# Patient Record
Sex: FEMALE | Race: WHITE | Employment: OTHER | URBAN - METROPOLITAN AREA
[De-identification: names, ages, dates, MRNs, and addresses within clinical notes are randomized per-mention and may not be internally consistent; named-entity substitution may affect disease eponyms.]

---

## 2017-11-08 ENCOUNTER — APPOINTMENT (OUTPATIENT)
Dept: CT IMAGING | Facility: CLINIC | Age: 82
End: 2017-11-08
Attending: EMERGENCY MEDICINE
Payer: COMMERCIAL

## 2017-11-08 ENCOUNTER — HOSPITAL ENCOUNTER (EMERGENCY)
Facility: CLINIC | Age: 82
Discharge: HOME | End: 2017-11-08
Attending: EMERGENCY MEDICINE | Admitting: EMERGENCY MEDICINE
Payer: COMMERCIAL

## 2017-11-08 VITALS
HEIGHT: 65 IN | HEART RATE: 84 BPM | OXYGEN SATURATION: 98 % | TEMPERATURE: 100.2 F | SYSTOLIC BLOOD PRESSURE: 137 MMHG | RESPIRATION RATE: 16 BRPM | DIASTOLIC BLOOD PRESSURE: 82 MMHG

## 2017-11-08 DIAGNOSIS — K57.32 DIVERTICULITIS OF COLON: ICD-10-CM

## 2017-11-08 LAB
ALBUMIN SERPL-MCNC: 3.4 G/DL (ref 3.4–5)
ALBUMIN UR-MCNC: NEGATIVE MG/DL
ALP SERPL-CCNC: 73 U/L (ref 40–150)
ALT SERPL W P-5'-P-CCNC: 24 U/L (ref 0–50)
ANION GAP SERPL CALCULATED.3IONS-SCNC: 6 MMOL/L (ref 3–14)
APPEARANCE UR: CLEAR
AST SERPL W P-5'-P-CCNC: 28 U/L (ref 0–45)
BACTERIA #/AREA URNS HPF: ABNORMAL /HPF
BASOPHILS # BLD AUTO: 0 10E9/L (ref 0–0.2)
BASOPHILS NFR BLD AUTO: 0.3 %
BILIRUB SERPL-MCNC: 0.7 MG/DL (ref 0.2–1.3)
BILIRUB UR QL STRIP: NEGATIVE
BUN SERPL-MCNC: 17 MG/DL (ref 7–30)
CALCIUM SERPL-MCNC: 8.9 MG/DL (ref 8.5–10.1)
CHLORIDE SERPL-SCNC: 107 MMOL/L (ref 94–109)
CO2 SERPL-SCNC: 30 MMOL/L (ref 20–32)
COLOR UR AUTO: YELLOW
CREAT SERPL-MCNC: 0.78 MG/DL (ref 0.52–1.04)
DIFFERENTIAL METHOD BLD: ABNORMAL
EOSINOPHIL # BLD AUTO: 0.2 10E9/L (ref 0–0.7)
EOSINOPHIL NFR BLD AUTO: 1.9 %
ERYTHROCYTE [DISTWIDTH] IN BLOOD BY AUTOMATED COUNT: 14.9 % (ref 10–15)
GFR SERPL CREATININE-BSD FRML MDRD: 69 ML/MIN/1.7M2
GLUCOSE SERPL-MCNC: 92 MG/DL (ref 70–99)
GLUCOSE UR STRIP-MCNC: NEGATIVE MG/DL
HCT VFR BLD AUTO: 42.3 % (ref 35–47)
HGB BLD-MCNC: 14.1 G/DL (ref 11.7–15.7)
HGB UR QL STRIP: ABNORMAL
IMM GRANULOCYTES # BLD: 0 10E9/L (ref 0–0.4)
IMM GRANULOCYTES NFR BLD: 0.3 %
INR PPP: 2.39 (ref 0.86–1.14)
KETONES UR STRIP-MCNC: NEGATIVE MG/DL
LACTATE BLD-SCNC: 1.3 MMOL/L (ref 0.7–2)
LEUKOCYTE ESTERASE UR QL STRIP: NEGATIVE
LYMPHOCYTES # BLD AUTO: 0.9 10E9/L (ref 0.8–5.3)
LYMPHOCYTES NFR BLD AUTO: 8.3 %
MCH RBC QN AUTO: 32.6 PG (ref 26.5–33)
MCHC RBC AUTO-ENTMCNC: 33.3 G/DL (ref 31.5–36.5)
MCV RBC AUTO: 98 FL (ref 78–100)
MONOCYTES # BLD AUTO: 1.4 10E9/L (ref 0–1.3)
MONOCYTES NFR BLD AUTO: 13.3 %
NEUTROPHILS # BLD AUTO: 8 10E9/L (ref 1.6–8.3)
NEUTROPHILS NFR BLD AUTO: 75.9 %
NITRATE UR QL: POSITIVE
NRBC # BLD AUTO: 0 10*3/UL
NRBC BLD AUTO-RTO: 0 /100
PH UR STRIP: 7.5 PH (ref 5–7)
PLATELET # BLD AUTO: 188 10E9/L (ref 150–450)
POTASSIUM SERPL-SCNC: 4.2 MMOL/L (ref 3.4–5.3)
PROT SERPL-MCNC: 7 G/DL (ref 6.8–8.8)
RADIOLOGIST FLAGS: ABNORMAL
RBC # BLD AUTO: 4.33 10E12/L (ref 3.8–5.2)
RBC #/AREA URNS AUTO: 9 /HPF (ref 0–2)
SODIUM SERPL-SCNC: 143 MMOL/L (ref 133–144)
SOURCE: ABNORMAL
SP GR UR STRIP: 1.01 (ref 1–1.03)
SQUAMOUS #/AREA URNS AUTO: 1 /HPF (ref 0–1)
UROBILINOGEN UR STRIP-MCNC: NORMAL MG/DL (ref 0–2)
WBC # BLD AUTO: 10.5 10E9/L (ref 4–11)
WBC #/AREA URNS AUTO: 3 /HPF (ref 0–2)

## 2017-11-08 PROCEDURE — 85025 COMPLETE CBC W/AUTO DIFF WBC: CPT | Performed by: EMERGENCY MEDICINE

## 2017-11-08 PROCEDURE — 25000128 H RX IP 250 OP 636: Performed by: EMERGENCY MEDICINE

## 2017-11-08 PROCEDURE — 99285 EMERGENCY DEPT VISIT HI MDM: CPT | Mod: Z6 | Performed by: EMERGENCY MEDICINE

## 2017-11-08 PROCEDURE — 85610 PROTHROMBIN TIME: CPT | Performed by: EMERGENCY MEDICINE

## 2017-11-08 PROCEDURE — 96360 HYDRATION IV INFUSION INIT: CPT | Performed by: EMERGENCY MEDICINE

## 2017-11-08 PROCEDURE — 87086 URINE CULTURE/COLONY COUNT: CPT | Performed by: EMERGENCY MEDICINE

## 2017-11-08 PROCEDURE — 25000125 ZZHC RX 250: Performed by: EMERGENCY MEDICINE

## 2017-11-08 PROCEDURE — 25000132 ZZH RX MED GY IP 250 OP 250 PS 637: Performed by: EMERGENCY MEDICINE

## 2017-11-08 PROCEDURE — 99285 EMERGENCY DEPT VISIT HI MDM: CPT | Mod: 25 | Performed by: EMERGENCY MEDICINE

## 2017-11-08 PROCEDURE — 87186 SC STD MICRODIL/AGAR DIL: CPT | Performed by: EMERGENCY MEDICINE

## 2017-11-08 PROCEDURE — 80053 COMPREHEN METABOLIC PANEL: CPT | Performed by: EMERGENCY MEDICINE

## 2017-11-08 PROCEDURE — 74177 CT ABD & PELVIS W/CONTRAST: CPT

## 2017-11-08 PROCEDURE — 81001 URINALYSIS AUTO W/SCOPE: CPT | Performed by: EMERGENCY MEDICINE

## 2017-11-08 PROCEDURE — 83605 ASSAY OF LACTIC ACID: CPT | Performed by: EMERGENCY MEDICINE

## 2017-11-08 PROCEDURE — 96361 HYDRATE IV INFUSION ADD-ON: CPT | Performed by: EMERGENCY MEDICINE

## 2017-11-08 PROCEDURE — 87088 URINE BACTERIA CULTURE: CPT | Performed by: EMERGENCY MEDICINE

## 2017-11-08 RX ORDER — METRONIDAZOLE 500 MG/1
500 TABLET ORAL 3 TIMES DAILY
Qty: 30 TABLET | Refills: 0 | Status: SHIPPED | OUTPATIENT
Start: 2017-11-08 | End: 2017-11-18

## 2017-11-08 RX ORDER — ACETAMINOPHEN AND CODEINE PHOSPHATE 300; 30 MG/1; MG/1
1-2 TABLET ORAL EVERY 6 HOURS PRN
Qty: 20 TABLET | Refills: 0 | Status: SHIPPED | OUTPATIENT
Start: 2017-11-08 | End: 2017-12-05

## 2017-11-08 RX ORDER — IOPAMIDOL 755 MG/ML
82 INJECTION, SOLUTION INTRAVASCULAR ONCE
Status: COMPLETED | OUTPATIENT
Start: 2017-11-08 | End: 2017-11-08

## 2017-11-08 RX ORDER — ACETAMINOPHEN 325 MG/1
650 TABLET ORAL ONCE
Status: COMPLETED | OUTPATIENT
Start: 2017-11-08 | End: 2017-11-08

## 2017-11-08 RX ORDER — METRONIDAZOLE 500 MG/1
500 TABLET ORAL ONCE
Status: COMPLETED | OUTPATIENT
Start: 2017-11-08 | End: 2017-11-08

## 2017-11-08 RX ORDER — CIPROFLOXACIN 500 MG/1
500 TABLET, FILM COATED ORAL ONCE
Status: COMPLETED | OUTPATIENT
Start: 2017-11-08 | End: 2017-11-08

## 2017-11-08 RX ORDER — LIDOCAINE 40 MG/G
CREAM TOPICAL
Status: DISCONTINUED | OUTPATIENT
Start: 2017-11-08 | End: 2017-11-08 | Stop reason: HOSPADM

## 2017-11-08 RX ORDER — CIPROFLOXACIN 500 MG/1
500 TABLET, FILM COATED ORAL 2 TIMES DAILY
Qty: 20 TABLET | Refills: 0 | Status: SHIPPED | OUTPATIENT
Start: 2017-11-08 | End: 2017-11-18

## 2017-11-08 RX ORDER — SODIUM CHLORIDE 9 MG/ML
1000 INJECTION, SOLUTION INTRAVENOUS CONTINUOUS
Status: DISCONTINUED | OUTPATIENT
Start: 2017-11-08 | End: 2017-11-08 | Stop reason: HOSPADM

## 2017-11-08 RX ADMIN — SODIUM CHLORIDE 500 ML: 9 INJECTION, SOLUTION INTRAVENOUS at 02:54

## 2017-11-08 RX ADMIN — IOPAMIDOL 82 ML: 755 INJECTION, SOLUTION INTRAVENOUS at 04:21

## 2017-11-08 RX ADMIN — SODIUM CHLORIDE 1000 ML: 9 INJECTION, SOLUTION INTRAVENOUS at 03:55

## 2017-11-08 RX ADMIN — ACETAMINOPHEN 650 MG: 325 TABLET, FILM COATED ORAL at 05:50

## 2017-11-08 RX ADMIN — CIPROFLOXACIN HYDROCHLORIDE 500 MG: 500 TABLET, FILM COATED ORAL at 05:48

## 2017-11-08 RX ADMIN — METRONIDAZOLE 500 MG: 500 TABLET ORAL at 05:48

## 2017-11-08 RX ADMIN — SODIUM CHLORIDE, PRESERVATIVE FREE 72 ML: 5 INJECTION INTRAVENOUS at 04:22

## 2017-11-08 ASSESSMENT — ENCOUNTER SYMPTOMS
FREQUENCY: 0
FEVER: 0
NAUSEA: 0
MYALGIAS: 0
DYSURIA: 0
APPETITE CHANGE: 0
NECK PAIN: 0
SORE THROAT: 0
LIGHT-HEADEDNESS: 0
HEADACHES: 0
PALPITATIONS: 0
DIZZINESS: 0
ARTHRALGIAS: 0
VOMITING: 0
DIFFICULTY URINATING: 0
CHILLS: 0
ABDOMINAL PAIN: 1
FATIGUE: 0
BRUISES/BLEEDS EASILY: 0
DIARRHEA: 0
COUGH: 0
CHEST TIGHTNESS: 0
SHORTNESS OF BREATH: 0
HEMATURIA: 0
BACK PAIN: 0
BLOOD IN STOOL: 0
CONSTIPATION: 0
RHINORRHEA: 0
DIAPHORESIS: 0
FLANK PAIN: 0
CONFUSION: 0
WEAKNESS: 0
AGITATION: 0

## 2017-11-08 NOTE — ED AVS SNAPSHOT
[facility], [place], Emergency Department    [address] [place] [address]    Phone:  [phone]                                       Brie Gonzalez   MRN: [id number]    Department:  [facility], Emergency Department   Date of Visit:  11/8/2017           After Visit Summary Signature Page     I have received my discharge instructions, and my questions have been answered. I have discussed any challenges I see with this plan with the nurse or doctor.    ..........................................................................................................................................  Patient/Patient Representative Signature      ..........................................................................................................................................  Patient Representative Print Name and Relationship to Patient    ..................................................               ................................................  Date                                            Time    ..........................................................................................................................................  Reviewed by Signature/Title    ...................................................              ..............................................  Date                                                            Time

## 2017-11-08 NOTE — ED PROVIDER NOTES
"  History     Chief Complaint   Patient presents with     Abdominal Pain     HPI  Brie Gonzalez is a 89 year old female who presents with 24 hour history of abdominal pain. Pain is located in the left lower quadrant. Constant pain that waxes and wanes. No dysuria or other  symptoms. No gross hematuria. No fever or chills. No back or flank pain. No chest pain or dyspnea. Had appendectomy many years ago. No history of cholelithiasis or peptic acid disease. No history of diverticular disease or nephrolithiasis. No nausea or vomiting. No appetite change. No confusion or other mental status changes.    I have reviewed the Medications, Allergies, Past Medical and Surgical History, and Social History in the [employer] system.  History reviewed. No pertinent past medical history.      Review of Systems   Constitutional: Negative for appetite change, chills, diaphoresis, fatigue and fever.   HENT: Negative for congestion, rhinorrhea and sore throat.    Eyes: Negative for visual disturbance.   Respiratory: Negative for cough, chest tightness and shortness of breath.    Cardiovascular: Negative for chest pain, palpitations and leg swelling.   Gastrointestinal: Positive for abdominal pain. Negative for blood in stool, constipation, diarrhea, nausea and vomiting.   Genitourinary: Negative for difficulty urinating, dysuria, flank pain, frequency, hematuria and pelvic pain.   Musculoskeletal: Negative for arthralgias, back pain, myalgias and neck pain.   Skin: Negative for rash.   Allergic/Immunologic: Negative for immunocompromised state.   Neurological: Negative for dizziness, syncope, weakness, light-headedness and headaches.   Hematological: Does not bruise/bleed easily.   Psychiatric/Behavioral: Negative for agitation and confusion.       Physical Exam   BP: (!) 171/110  Pulse: 84  Temp: 99.2  F (37.3  C)  Resp: 14  Height: 165.1 cm (5' 5\")  SpO2: 100 %      Physical Exam   Constitutional: She appears well-developed and " well-nourished. No distress.   HENT:   Head: Normocephalic and atraumatic.   Mouth/Throat: Oropharynx is clear and moist.   Eyes: Conjunctivae and EOM are normal. Pupils are equal, round, and reactive to light.   Neck: Normal range of motion. Neck supple.   Cardiovascular: Normal rate, regular rhythm, normal heart sounds and intact distal pulses.    Pulmonary/Chest: Effort normal and breath sounds normal. No respiratory distress.   Abdominal: Normal appearance and bowel sounds are normal. She exhibits no pulsatile midline mass and no mass. There is tenderness in the left lower quadrant. There is no rigidity, no rebound, no guarding and no CVA tenderness.   Musculoskeletal: She exhibits no edema or tenderness.   Neurological: She is alert.   Skin: Skin is warm and dry. No rash noted.   Psychiatric: She has a normal mood and affect.   Nursing note and vitals reviewed.      ED Course     ED Course     Procedures             Critical Care time:  none             Labs Ordered and Resulted from Time of ED Arrival Up to the Time of Departure from the ED   ROUTINE UA WITH MICROSCOPIC REFLEX TO CULTURE - Abnormal; Notable for the following:        Result Value    Blood Urine Small (*)     pH Urine 7.5 (*)     Nitrite Urine Positive (*)     WBC Urine 3 (*)     RBC Urine 9 (*)     Bacteria Urine Few (*)     All other components within normal limits   CBC WITH PLATELETS DIFFERENTIAL - Abnormal; Notable for the following:     Absolute Monocytes 1.4 (*)     All other components within normal limits   INR - Abnormal; Notable for the following:     INR 2.39 (*)     All other components within normal limits   COMPREHENSIVE METABOLIC PANEL   LACTIC ACID WHOLE BLOOD   PERIPHERAL IV CATHETER            Assessments & Plan (with Medical Decision Making)   Left lower quadrant pain and tenderness. CT report indicates diverticulitis. No evidence of complications including perforation or abscess. Discussed treatment options including IV  antibiotics and admission and oral antibiotic treatment with close outpatient follow up. She wants to be treated as outpatient. She expressed understanding of the provisional diagnosis and the need for follow up. Instructed to return if persistent or worsening symptoms. There is no evidence of sepsis or other complication of diverticulitis. States she understands the risks, benefits and alternatives regarding therapy. She is tolerating oral fluids well and does not appear significantly ill nor toxic in appearance. Close clinic follow up needed and return immediately if persistent or worsening symptoms.    I have reviewed the nursing notes.    I have reviewed the findings, diagnosis, plan and need for follow up with the patient.    Discharge Medication List as of 11/8/2017  5:53 AM      START taking these medications    Details   metroNIDAZOLE (FLAGYL) 500 MG tablet Take 1 tablet (500 mg) by mouth 3 times daily for 10 days, Disp-30 tablet, R-0, Local PrintEat yogurt or cottage cheese daily to prevent diarrhea caused by taking this antibiotic      ciprofloxacin (CIPRO) 500 MG tablet Take 1 tablet (500 mg) by mouth 2 times daily for 10 days, Disp-20 tablet, R-0, Local Print      acetaminophen-codeine (TYLENOL WITH CODEINE #3) 300-30 MG per tablet Take 1-2 tablets by mouth every 6 hours as needed for pain, Disp-20 tablet, R-0, Local Print             Final diagnoses:   Diverticulitis of colon       11/8/2017   [facility], [place], EMERGENCY DEPARTMENT     Angel Guallpa MD  12/01/17 9215

## 2017-11-08 NOTE — DISCHARGE INSTRUCTIONS
Diverticulitis    Some people get pouches along the wall of the colon as they get older. The pouches, called diverticuli, usually cause no symptoms. If the pouches become blocked, you can get an infection. This infection is called diverticulitis. It causes pain in your lower abdomen and fever. If not treated, it can become a serious condition, causing an abscess to form inside the pouch. The abscess may block the intestinal tract even or rupture, spreading infection throughout the abdomen.  When treatment is started early, oral antibiotics alone may be enough to cure diverticulitis. This method is tried first. But, if you don't improve or if your condition gets worse while using oral antibiotics, you may need to be admitted to the hospital for IV antibiotics. Severe cases may require surgery.  Home care  The following guidelines will help you care for yourself at home:    During the acute illness, rest and follow your healthcare provider's instructions about diet. Sometimes you will need to follow a clear liquid diet to rest your bowel. Once your symptoms are better, you may be told to follow a low-fiber diet for some time. Include foods like:    Flake cereal, mashed potatoes, pancakes, waffles, pasta, white bread, rice, applesauce, bananas, eggs, fish, poultry, tofu, and cooked soft vegetables    Take antibiotics exactly as instructed. Don't miss any doses or stop taking the medication, even if you feel better.    Monitor your temperature and tell your healthcare provider if you have rising temperatures.  Preventing future attacks  Once you have an episode of diverticulitis, you are at risk for having it again. After you have recovered from this episode, you may be able to lower your risk by eating a high-fiber diet (20 gm/day to 35 gm/day of fiber). This cleans out the colon pouches that already exist and may prevent new ones from forming. Foods high in fiber include fresh fruits and edible peelings, raw or  lightly cooked vegetables, whole grain cereals and breads, dried beans and peas, and bran.  Other steps that can help prevent future attacks include:    Take your medicines, such as antibiotics, as your healthcare provider says.    Drink 6 to 8 glasses of water every day, unless told otherwise.    Use a heating pad or hot water bottle to help abdominal cramping or pain.    Begin an exercise program. Ask your healthcare provider how to get started. You can benefit from simple activities such as walking or gardening.    Treat diarrhea with a bland diet. Start with liquids only; then slowly add fiber over time.    Watch for changes in your bowel movements (constipation to diarrhea). Avoid constipation by eating a high fiber diet and taking a stool softener if needed.    Get plenty of rest and sleep.  Follow-up care  Follow up with your healthcare provider as advised or sooner if you are not getting better in the next 2 days.  When to seek medical advice  Call your healthcare provider right away if any of these occur:    Fever of 100.4 F (38 C) or higher, or as directed by your healthcare provider    Repeated vomiting or swelling of the abdomen    Weakness, dizziness, light-headedness    Pain in your abdomen that gets worse, severe, or spreads to your back    Pain that moves to the right lower abdomen    Rectal bleeding (stools that are red, black or maroon color)    Unexpected vaginal bleeding  Date Last Reviewed: 9/1/2016 2000-2017 [employer]. [address] [place] [address]. All rights reserved. This information is not intended as a substitute for professional medical care. Always follow your healthcare professional's instructions.      Take antibiotics as directed.  Take Tylenol or Tylenol # 3 for pain if needed.  Drink plenty of fluids.   Follow up in clinic this week.  Return if persistent or worsening symptoms, especially if increased pain, fever, or vomiting.  Follow up on fibroids  with your primary clinic doctor and regarding possible adjustments to warfarin dose while taking antibiotics.

## 2017-11-08 NOTE — ED NOTES
Sleeping and resting comfortably. Pt was woke by sharp pain in the left side radiating to the right. Pt walked around a little but was unable to get relief.

## 2017-11-08 NOTE — ED AVS SNAPSHOT
[facility], Emergency Department    [address] [facility] [address]    Phone:  [phone]                                       Brie Gonzalez   MRN: [id number]    Department:  [facility], Emergency Department   Date of Visit:  11/8/2017           Patient Information     Date Of Birth          7/8/1925        Your diagnoses for this visit were:     Diverticulitis of colon        You were seen by Angel Guallpa MD.      Follow-up Information     Follow up with Philip Pederson.    Specialty:  Physician Assistant    Contact information:    [facility]  [address]   [place] [address]  [phone]          Discharge Instructions         Diverticulitis    Some people get pouches along the wall of the colon as they get older. The pouches, called diverticuli, usually cause no symptoms. If the pouches become blocked, you can get an infection. This infection is called diverticulitis. It causes pain in your lower abdomen and fever. If not treated, it can become a serious condition, causing an abscess to form inside the pouch. The abscess may block the intestinal tract even or rupture, spreading infection throughout the abdomen.  When treatment is started early, oral antibiotics alone may be enough to cure diverticulitis. This method is tried first. But, if you don't improve or if your condition gets worse while using oral antibiotics, you may need to be admitted to the hospital for IV antibiotics. Severe cases may require surgery.  Home care  The following guidelines will help you care for yourself at home:    During the acute illness, rest and follow your healthcare provider's instructions about diet. Sometimes you will need to follow a clear liquid diet to rest your bowel. Once your symptoms are better, you may be told to follow a low-fiber diet for some time. Include foods like:    Flake cereal, mashed potatoes, pancakes, waffles, pasta, white bread, rice, applesauce, bananas, eggs,  fish, poultry, tofu, and cooked soft vegetables    Take antibiotics exactly as instructed. Don't miss any doses or stop taking the medication, even if you feel better.    Monitor your temperature and tell your healthcare provider if you have rising temperatures.  Preventing future attacks  Once you have an episode of diverticulitis, you are at risk for having it again. After you have recovered from this episode, you may be able to lower your risk by eating a high-fiber diet (20 gm/day to 35 gm/day of fiber). This cleans out the colon pouches that already exist and may prevent new ones from forming. Foods high in fiber include fresh fruits and edible peelings, raw or lightly cooked vegetables, whole grain cereals and breads, dried beans and peas, and bran.  Other steps that can help prevent future attacks include:    Take your medicines, such as antibiotics, as your healthcare provider says.    Drink 6 to 8 glasses of water every day, unless told otherwise.    Use a heating pad or hot water bottle to help abdominal cramping or pain.    Begin an exercise program. Ask your healthcare provider how to get started. You can benefit from simple activities such as walking or gardening.    Treat diarrhea with a bland diet. Start with liquids only; then slowly add fiber over time.    Watch for changes in your bowel movements (constipation to diarrhea). Avoid constipation by eating a high fiber diet and taking a stool softener if needed.    Get plenty of rest and sleep.  Follow-up care  Follow up with your healthcare provider as advised or sooner if you are not getting better in the next 2 days.  When to seek medical advice  Call your healthcare provider right away if any of these occur:    Fever of 100.4 F (38 C) or higher, or as directed by your healthcare provider    Repeated vomiting or swelling of the abdomen    Weakness, dizziness, light-headedness    Pain in your abdomen that gets worse, severe, or spreads to your  back    Pain that moves to the right lower abdomen    Rectal bleeding (stools that are red, black or maroon color)    Unexpected vaginal bleeding  Date Last Reviewed: 9/1/2016 2000-2017 [employer]. [address] [place] [address], [place] [address]. All rights reserved. This information is not intended as a substitute for professional medical care. Always follow your healthcare professional's instructions.      Take antibiotics as directed.  Take Tylenol or Tylenol # 3 for pain if needed.  Drink plenty of fluids.   Follow up in clinic this week.  Return if persistent or worsening symptoms, especially if increased pain, fever, or vomiting.  Follow up on fibroids with your primary clinic doctor and regarding possible adjustments to warfarin dose while taking antibiotics.    24 Hour Appointment Hotline       To make an appointment at any [facility], call [phone] ([phone]). If you don't have a family doctor or clinic, we will help you find one. [facility] are conveniently located to serve the needs of you and your family.             Review of your medicines      START taking        Dose / Directions Last dose taken    acetaminophen-codeine 300-30 MG per tablet   Commonly known as:  TYLENOL WITH CODEINE #3   Dose:  1-2 tablet   Quantity:  20 tablet        Take 1-2 tablets by mouth every 6 hours as needed for pain   Refills:  0        ciprofloxacin 500 MG tablet   Commonly known as:  CIPRO   Dose:  500 mg   Quantity:  20 tablet        Take 1 tablet (500 mg) by mouth 2 times daily for 10 days   Refills:  0        metroNIDAZOLE 500 MG tablet   Commonly known as:  FLAGYL   Dose:  500 mg   Quantity:  30 tablet        Take 1 tablet (500 mg) by mouth 3 times daily for 10 days   Refills:  0          Our records show that you are taking the medicines listed below. If these are incorrect, please call your family doctor or clinic.        Dose / Directions Last dose taken    ASPIRIN PO         Refills:  0        ATENOLOL PO        Take by mouth daily   Refills:  0        COUMADIN PO        Refills:  0        SIMVASTATIN PO   Dose:  20 mg        Take 20 mg by mouth At Bedtime   Refills:  0                Prescriptions were sent or printed at these locations (3 Prescriptions)                   Other Prescriptions                Printed at Department/Unit printer (3 of 3)         metroNIDAZOLE (FLAGYL) 500 MG tablet               ciprofloxacin (CIPRO) 500 MG tablet               acetaminophen-codeine (TYLENOL WITH CODEINE #3) 300-30 MG per tablet                Procedures and tests performed during your visit     CBC with platelets differential    CT Abdomen Pelvis w Contrast    Comprehensive metabolic panel    INR    Lactic acid whole blood    Peripheral IV catheter    UA with Microscopic reflex to Culture    Urine Culture Aerobic Bacterial      Orders Needing Specimen Collection     None      Pending Results     Date and Time Order Name Status Description    11/8/2017 0345 CT Abdomen Pelvis w Contrast Preliminary     11/8/2017 0249 Urine Culture Aerobic Bacterial Preliminary             Pending Culture Results     Date and Time Order Name Status Description    11/8/2017 0249 Urine Culture Aerobic Bacterial Preliminary             Pending Results Instructions     If you had any lab results that were not finalized at the time of your Discharge, you can call the ED Lab Result RN at [phone]. You will be contacted by this team for any positive Lab results or changes in treatment. The nurses are available 7 days a week from 10A to 6:30P.  You can leave a message 24 hours per day and they will return your call.        Thank you for choosing [facility]       Thank you for choosing [facility] for your care. Our goal is always to provide you with excellent care. Hearing back from our patients is one way we can continue to improve our services. Please take a few minutes to complete the written survey that you may  receive in the mail after you visit with us. Thank you!        Care EveryWhere ID     This is your Care EveryWhere ID. This could be used by other organizations to access your [facility] medical records  [id number]        Equal Access to Services     MOHIT REMY : Morgan Rivera, wakt benites, qachelsea kaalmakarissa mondragon, ana dockery. [facility] [phone].    ATENCIÓN: Si habla español, tiene a posada disposición servicios gratuitos de asistencia lingüística. Llame al [phone].    We comply with applicable federal civil rights laws and [place] laws. We do not discriminate on the basis of race, color, national origin, age, disability, sex, sexual orientation, or gender identity.            After Visit Summary       This is your record. Keep this with you and show to your community pharmacist(s) and doctor(s) at your next visit.

## 2017-11-09 LAB
BACTERIA SPEC CULT: ABNORMAL
BACTERIA SPEC CULT: ABNORMAL
Lab: ABNORMAL
SPECIMEN SOURCE: ABNORMAL

## 2017-11-10 ENCOUNTER — TELEPHONE (OUTPATIENT)
Dept: EMERGENCY MEDICINE | Facility: CLINIC | Age: 82
End: 2017-11-10

## 2017-11-10 NOTE — TELEPHONE ENCOUNTER
[place]  Emergency Department Lab result notification:    [place] ED lab result protocol used  Urine Culture    Reason for call  Notify of lab results, assess symptoms,  review ED providers recommendations/discharge instructions (if necessary) and advise per ED lab result f/u protocol    Lab Result  Final Urine Culture Report on 11/10/17.  [place] ED discharge antibiotic's:  Ciprofloxacin (Cipro) 500 mg tablet, 1 tablet (500 mg) by mouth 2 times daily for 10 days and Flagyl  Bacteria #1: 50,000 to 100,000 colonies/mL Escherichia coli is [SUSCEPTIBLE] to ED discharge antibiotic.    Bacteria #2: 10,000 to 50,000 colonies/mL Strain 2 Escherichia coli is [SUSCEPTIBLE] to ED discharge antibiotic.   Recommendations in treatment per  ED Lab result protocol.    Information table from ED Provider visit on 11/10/17  Symptoms reported at ED visit (Chief complaint, HPI) Brie Gonzalez is a 89 year old female who presents with 24 hour history of abdominal pain. Pain is located in the left lower quadrant. Constant pain that waxes and wanes. No dysuria or other  symptoms. No gross hematuria. No fever or chills. No back or flank pain. No chest pain or dyspnea. Had appendectomy many years ago. No history of cholelithiasis or peptic acid disease. No history of diverticular disease or nephrolithiasis.       ED providers Impression and Plan (applicable information) I have reviewed the nursing notes.     I have reviewed the findings, diagnosis, plan and need for follow up with the patient.   Miscellaneous information N/A     RN Assessment (Patient s current Symptoms), include time called.  [Insert Left message here if message left]  Brie has no urinary symptoms, was unaware of a UTI.  Is taking abx as prescribed, abdominal pain much improved.  Has PCP appt on Monday 11/13/17, as requested, did fax labs to Dr. Pederson at [facility] in [facility] at [phone] at 3:20 pm.    Please Contact your PCP clinic or return to the  Emergency department if your:    Symptoms return.    Symptoms do not resolve after completing antibiotic.    Symptoms worsen or other concerning symptom's.    PCP follow-up Questions asked: YES       Reyna Joyce, PATITO    [facility] RN  Lung Nodule and ED Lab Results F/U RN  Epic pool (ED late result f/u RN) : P [id number]   # [phone]    Copy of Lab result   Order   Urine Culture Aerobic Bacterial [[id number]] (Order [id number])   Exam Information   Exam Date Exam Time Accession # Results    11/8/17  2:49 AM [id number]    Component Results   Component Collected Lab   Specimen Description 11/08/2017  2:49    Midstream Urine   Special Requests 11/08/2017  2:49 AM 75   Specimen received in preservative   Culture Micro (Abnormal) 11/08/2017  2:49    50,000 to 100,000 colonies/mL   Escherichia coli      Culture Micro (Abnormal) 11/08/2017  2:49    10,000 to 50,000 colonies/mL   Strain 2   Escherichia coli      Culture & Susceptibility   ESCHERICHIA COLI   Antibiotic Interpretation Sensitivity Unit Method Status   AMPICILLIN Sensitive <=2 ug/mL HELEN Final   AMPICILLIN/SULBACTAM Sensitive <=2 ug/mL HELEN Final   CEFAZOLIN Sensitive <=4 ug/mL HELEN Final   Comment: Cefazolin HELEN breakpoints are for the treatment of uncomplicated urinary tract   infections.  For the treatment of systemic infections, please contact the   laboratory for additional testing.   CEFEPIME Sensitive <=1 ug/mL HELEN Final   CEFOXITIN Sensitive <=4 ug/mL EHLEN Final   CEFTAZIDIME Sensitive <=1 ug/mL HELEN Final   CEFTRIAXONE Sensitive <=1 ug/mL HELEN Final   CIPROFLOXACIN Sensitive <=0.25 ug/mL HELEN Final   GENTAMICIN Sensitive <=1 ug/mL HELEN Final   LEVOFLOXACIN Sensitive <=0.12 ug/mL HELEN Final   NITROFURANTOIN Sensitive <=16 ug/mL HELEN Final   Piperacillin/Tazo Sensitive <=4 ug/mL HELEN Final   TOBRAMYCIN Sensitive <=1 ug/mL HELEN Final   Trimethoprim/Sulfa Sensitive <=1/19 ug/mL HELEN Final         ESCHERICHIA COLI   Antibiotic  Interpretation Sensitivity Unit Method Status   AMPICILLIN Sensitive <=2 ug/mL HELEN Final   AMPICILLIN/SULBACTAM Sensitive <=2 ug/mL HELEN Final   CEFAZOLIN Sensitive <=4 ug/mL HELEN Final   Comment: Cefazolin HELEN breakpoints are for the treatment of uncomplicated urinary tract   infections.  For the treatment of systemic infections, please contact the   laboratory for additional testing.   CEFEPIME Sensitive <=1 ug/mL HELEN Final   CEFOXITIN Sensitive <=4 ug/mL HELEN Final   CEFTAZIDIME Sensitive <=1 ug/mL HELEN Final   CEFTRIAXONE Sensitive <=1 ug/mL HELEN Final   CIPROFLOXACIN Sensitive <=0.25 ug/mL HELEN Final   GENTAMICIN Sensitive <=1 ug/mL HELEN Final   LEVOFLOXACIN Sensitive <=0.12 ug/mL HELEN Final   NITROFURANTOIN Sensitive <=16 ug/mL HELEN Final   Piperacillin/Tazo Sensitive <=4 ug/mL HELEN Final   TOBRAMYCIN Sensitive <=1 ug/mL HELEN Final   Trimethoprim/Sulfa Sensitive <=1/19 ug/mL HELEN Final

## 2017-11-19 ENCOUNTER — HOSPITAL ENCOUNTER (EMERGENCY)
Facility: CLINIC | Age: 82
Discharge: HOME | End: 2017-11-19
Attending: EMERGENCY MEDICINE | Admitting: EMERGENCY MEDICINE
Payer: COMMERCIAL

## 2017-11-19 VITALS
HEART RATE: 67 BPM | BODY MASS INDEX: 21.66 KG/M2 | DIASTOLIC BLOOD PRESSURE: 74 MMHG | SYSTOLIC BLOOD PRESSURE: 133 MMHG | HEIGHT: 65 IN | OXYGEN SATURATION: 98 % | WEIGHT: 130 LBS | TEMPERATURE: 98 F | RESPIRATION RATE: 16 BRPM

## 2017-11-19 DIAGNOSIS — R04.0 EPISTAXIS: ICD-10-CM

## 2017-11-19 PROCEDURE — 99283 EMERGENCY DEPT VISIT LOW MDM: CPT | Performed by: EMERGENCY MEDICINE

## 2017-11-19 PROCEDURE — 99283 EMERGENCY DEPT VISIT LOW MDM: CPT | Mod: Z6 | Performed by: EMERGENCY MEDICINE

## 2017-11-19 RX ORDER — OXYMETAZOLINE HYDROCHLORIDE 0.05 G/100ML
2-3 SPRAY NASAL 2 TIMES DAILY PRN
Qty: 1 BOTTLE | Refills: 0 | Status: SHIPPED | OUTPATIENT
Start: 2017-11-19 | End: 2017-12-05

## 2017-11-19 NOTE — ED AVS SNAPSHOT
[facility], [place], Emergency Department    [address] [place] [address]    Phone:  [phone]                                       Brie Gonzalez   MRN: [id number]    Department:  [facility], Emergency Department   Date of Visit:  11/19/2017           After Visit Summary Signature Page     I have received my discharge instructions, and my questions have been answered. I have discussed any challenges I see with this plan with the nurse or doctor.    ..........................................................................................................................................  Patient/Patient Representative Signature      ..........................................................................................................................................  Patient Representative Print Name and Relationship to Patient    ..................................................               ................................................  Date                                            Time    ..........................................................................................................................................  Reviewed by Signature/Title    ...................................................              ..............................................  Date                                                            Time

## 2017-11-19 NOTE — ED AVS SNAPSHOT
[facility], Emergency Department    [address] [address]    Phone:  [phone]                                       Brie Gonzalez   MRN: [id number]    Department:  [facility], Emergency Department   Date of Visit:  11/19/2017           Patient Information     Date Of Birth          7/8/1925        Your diagnoses for this visit were:     Epistaxis        You were seen by John Hernandez MD.        Discharge Instructions       Please make an appointment to follow up with Your Primary Care Provider in 1 day as already planned.     If nosebleed restarts, blow nose then use Afrin, apply nose clip for 20 minutes. Then check for ongoing bleeding. If bleeding on check, apply nose clip for 20 more minutes and then recheck for bleeding. If still bleeding, then return to the ED.     Return to the ED if you are having worsening symptoms, or any other urgent/life-threatening concerns.       24 Hour Appointment Hotline       To make an appointment at any [facility] clinic, call [phone] ([phone]). If you don't have a family doctor or clinic, we will help you find one. [facility] clinics are conveniently located to serve the needs of you and your family.             Review of your medicines      START taking        Dose / Directions Last dose taken    oxymetazoline 0.05 % spray   Commonly known as:  AFRIN NASAL SPRAY   Dose:  2-3 spray   Quantity:  1 Bottle        Spray 2-3 sprays in nostril 2 times daily as needed (congestion or nosebleed)   Refills:  0          Our records show that you are taking the medicines listed below. If these are incorrect, please call your family doctor or clinic.        Dose / Directions Last dose taken    acetaminophen-codeine 300-30 MG per tablet   Commonly known as:  TYLENOL WITH CODEINE #3   Dose:  1-2 tablet   Quantity:  20 tablet        Take 1-2 tablets by mouth every 6 hours as needed for pain   Refills:  0        ASPIRIN PO        Refills:  0         ATENOLOL PO        Take by mouth daily   Refills:  0        CEFUROXIME AXETIL PO   Dose:  500 mg        Take 500 mg by mouth 2 times daily   Refills:  0        COUMADIN PO        Refills:  0        * METRONIDAZOLE PO   Dose:  500 mg        Take 500 mg by mouth every 8 hours   Refills:  0        SIMVASTATIN PO   Dose:  20 mg        Take 20 mg by mouth At Bedtime   Refills:  0        * Notice:  This list has 1 medication(s) that are the same as other medications prescribed for you. Read the directions carefully, and ask your doctor or other care provider to review them with you.      ASK your doctor about these medications        Dose / Directions Last dose taken    ciprofloxacin 500 MG tablet   Commonly known as:  CIPRO   Dose:  500 mg   Quantity:  20 tablet   Ask about: Should I take this medication?        Take 1 tablet (500 mg) by mouth 2 times daily for 10 days   Refills:  0        * metroNIDAZOLE 500 MG tablet   Commonly known as:  FLAGYL   Dose:  500 mg   Quantity:  30 tablet   Ask about: Should I take this medication?        Take 1 tablet (500 mg) by mouth 3 times daily for 10 days   Refills:  0        * Notice:  This list has 1 medication(s) that are the same as other medications prescribed for you. Read the directions carefully, and ask your doctor or other care provider to review them with you.            Prescriptions were sent or printed at these locations (1 Prescription)                   Other Prescriptions                Printed at Department/Unit printer (1 of 1)         oxymetazoline (AFRIN NASAL SPRAY) 0.05 % spray                Orders Needing Specimen Collection     None      Pending Results     No orders found from 11/17/2017 to 11/20/2017.            Pending Culture Results     No orders found from 11/17/2017 to 11/20/2017.            Pending Results Instructions     If you had any lab results that were not finalized at the time of your Discharge, you can call the ED Lab Result RN at  [phone]. You will be contacted by this team for any positive Lab results or changes in treatment. The nurses are available 7 days a week from 10A to 6:30P.  You can leave a message 24 hours per day and they will return your call.        Thank you for choosing [facility]       Thank you for choosing [facility] for your care. Our goal is always to provide you with excellent care. Hearing back from our patients is one way we can continue to improve our services. Please take a few minutes to complete the written survey that you may receive in the mail after you visit with us. Thank you!        Care EveryWhere ID     This is your Care EveryWhere ID. This could be used by other organizations to access your [facility] medical records  [id number]        Equal Access to Services     MOHIT REMY : Morgan Rivera, erasto benites, gulshna mondragon, ana dockery. So [facility] [phone].    ATENCIÓN: Si habla español, tiene a posada disposición servicios gratuitos de asistencia lingüística. Llame al [phone].    We comply with applicable federal civil rights laws and [place] laws. We do not discriminate on the basis of race, color, national origin, age, disability, sex, sexual orientation, or gender identity.            After Visit Summary       This is your record. Keep this with you and show to your community pharmacist(s) and doctor(s) at your next visit.

## 2017-11-20 NOTE — DISCHARGE INSTRUCTIONS
Please make an appointment to follow up with Your Primary Care Provider in 1 day as already planned.     If nosebleed restarts, blow nose then use Afrin, apply nose clip for 20 minutes. Then check for ongoing bleeding. If bleeding on check, apply nose clip for 20 more minutes and then recheck for bleeding. If still bleeding, then return to the ED.     Return to the ED if you are having worsening symptoms, or any other urgent/life-threatening concerns.

## 2017-11-20 NOTE — ED NOTES
BIBA for c/o nose bleed x 2 today. Recently had diverticulitis, prescribed cipro. INR was 4 yesterday. Warfarin for hx TIA per pt.

## 2017-11-20 NOTE — ED PROVIDER NOTES
History   Epistaxis     HPI  Brie Gonzalez is a 89-year-old female on warfarin therapy who presents with a nosebleed. The patient reports that she has had a high INR on her check 2 days ago and has held her warfarin for the past 2.5 days. This afternoon she had a nosebleed from the right nostril. This lasted for several minutes but resolved with direct pressure. The nosebleed recurred again tonight, and would not stop with direct pressure. She comes in for evaluation of her nosebleed. She notes that she had some blood pooling in the back of her throat when she was trying to pinch on her nose during this second nosebleed. No other sources of bleeding. The patient had recently been on metronidazole and ciprofloxacin for diverticulitis. She reports that her abdominal pain has been improving. The ciprofloxacin was recently discontinued and changed to a cephalosporin. She reports that she has recheck for her INR and visit with her primary-care doctor tomorrow.    No current facility-administered medications for this encounter.      Current Outpatient Prescriptions   Medication     CEFUROXIME AXETIL PO     METRONIDAZOLE PO     oxymetazoline (AFRIN NASAL SPRAY) 0.05 % spray     SIMVASTATIN PO     ATENOLOL PO     acetaminophen-codeine (TYLENOL WITH CODEINE #3) 300-30 MG per tablet     Warfarin Sodium (COUMADIN PO)     ASPIRIN PO     History reviewed. No pertinent past medical history.    History reviewed. No pertinent surgical history.    No family history on file.    Social History   Substance Use Topics     Smoking status: Never Smoker     Smokeless tobacco: Never Used     Alcohol use Yes      Comment: Rare        Allergies   Allergen Reactions     Penicillins Swelling       I have reviewed the Medications, Allergies, Past Medical and Surgical History, and Social History in the Epic system.    Review of Systems  A complete 14-system review of systems was completed with pertinent positives and negatives noted in the HPI,  "otherwise negative.     Physical Exam   BP: 133/74  Pulse: 67  Temp: 98  F (36.7  C)  Resp: 16  Height: 165.1 cm (5' 5\")  Weight: 59 kg (130 lb)  SpO2: 98 %      Physical Exam  /74  Pulse 67  Temp 98  F (36.7  C) (Oral)  Resp 16  Ht 1.651 m (5' 5\")  Wt 59 kg (130 lb)  SpO2 98%  BMI 21.63 kg/m2  General: well-appearing, no acute distress  HENT: MMM, no oropharyngeal lesions. Right nostril with 1 mm septal papule with evidence of recent bleeding. Normal left nostril. No blood in posterior oropharynx.   Eyes: PERRL, normal sclerae, no conjunctival pallor  Neck: non-tender, supple  Cardio: RRR, normal heart sounds, extremities well perfused  Resp: Normal work of breathing, clear breath sounds  Chest/Back: no visual signs of trauma, no CVA tenderness  Abdomen: no tenderness, non-distended, no rebound, no guarding  Neuro: alert and fully oriented. CN II-XII grossly intact. Grossly normal strength and sensation in all extremities.   MSK: no deformities.   Integumentary/Skin: no rash, normal color  Psych: normal affect, normal behavior    ED Course     ED Course     Procedures          Critical Care time:  None      Labs Ordered and Resulted from Time of ED Arrival Up to the Time of Departure from the ED - No data to display         Assessments & Plan (with Medical Decision Making)   In the ED, the patient was afebrile and hemodynamically stable. History is notable for right nostril nose bleed with supratherapeutic recent INR. Exam notable for initially nose clip in place, which was left in place for 20 minutes. Upon removal of the nose clip the epistaxis was hemostatic. Small punctate sore on the septum in the right nostril was identified as likely source of the recent bleeding. There was no blood in posterior oropharynx.     The complete clinical picture is most consistent with right anterior nosebleed related to warfarin use. The patient was discharged to home. Afrin prescription provided. The patient was " counseled that if her nosebleed recurs that she is to use the Afrin and apply a nose clip. If after two 20-minute periods of having the nose clip on the nosebleed is not controlled on recheck, then she is to return to the ED. The patient is otherwise to return to the ED if worsening symptoms or if there are any urgent/life-threatening concerns.     Clinical Impression:  Right nostril anterior nosebleed    John Hernandez MD  Emergency Medicine       I have reviewed the nursing notes.    I have reviewed the findings, diagnosis, plan and need for follow up with the patient.    Discharge Medication List as of 11/19/2017  8:53 PM      START taking these medications    Details   oxymetazoline (AFRIN NASAL SPRAY) 0.05 % spray Spray 2-3 sprays in nostril 2 times daily as needed (congestion or nosebleed), Disp-1 Bottle, R-0, Local Print             Final diagnoses:   Epistaxis       11/19/2017   [facility], [place], EMERGENCY DEPARTMENT     John Hernandez MD  11/20/17 0120

## 2017-12-05 ENCOUNTER — HOSPITAL ENCOUNTER (OUTPATIENT)
Facility: CLINIC | Age: 82
Setting detail: OBSERVATION
Discharge: NURSING, REHAB OR LONG-TERM CARE | End: 2017-12-06
Attending: EMERGENCY MEDICINE | Admitting: EMERGENCY MEDICINE
Payer: COMMERCIAL

## 2017-12-05 DIAGNOSIS — F41.9 ANXIETY: ICD-10-CM

## 2017-12-05 DIAGNOSIS — F32.9 MAJOR DEPRESSIVE DISORDER WITH SINGLE EPISODE, REMISSION STATUS UNSPECIFIED: ICD-10-CM

## 2017-12-05 DIAGNOSIS — I48.20 CHRONIC ATRIAL FIBRILLATION (H): Primary | ICD-10-CM

## 2017-12-05 DIAGNOSIS — R04.0 EPISTAXIS: ICD-10-CM

## 2017-12-05 DIAGNOSIS — Z79.01 LONG-TERM (CURRENT) USE OF ANTICOAGULANTS: ICD-10-CM

## 2017-12-05 DIAGNOSIS — R79.1 ABNORMAL COAGULATION PROFILE: ICD-10-CM

## 2017-12-05 DIAGNOSIS — I48.91 ATRIAL FIBRILLATION, UNSPECIFIED TYPE (H): ICD-10-CM

## 2017-12-05 LAB
ANION GAP SERPL CALCULATED.3IONS-SCNC: 6 MMOL/L (ref 3–14)
BASOPHILS # BLD AUTO: 0 10E9/L (ref 0–0.2)
BASOPHILS NFR BLD AUTO: 0.6 %
BUN SERPL-MCNC: 22 MG/DL (ref 7–30)
CALCIUM SERPL-MCNC: 9 MG/DL (ref 8.5–10.1)
CHLORIDE SERPL-SCNC: 110 MMOL/L (ref 94–109)
CO2 SERPL-SCNC: 27 MMOL/L (ref 20–32)
CREAT SERPL-MCNC: 1.17 MG/DL (ref 0.52–1.04)
DIFFERENTIAL METHOD BLD: NORMAL
EOSINOPHIL # BLD AUTO: 0.3 10E9/L (ref 0–0.7)
EOSINOPHIL NFR BLD AUTO: 3.7 %
ERYTHROCYTE [DISTWIDTH] IN BLOOD BY AUTOMATED COUNT: 14.8 % (ref 10–15)
GFR SERPL CREATININE-BSD FRML MDRD: 43 ML/MIN/1.7M2
GLUCOSE SERPL-MCNC: 105 MG/DL (ref 70–99)
HCT VFR BLD AUTO: 38.1 % (ref 35–47)
HGB BLD-MCNC: 12.6 G/DL (ref 11.7–15.7)
IMM GRANULOCYTES # BLD: 0 10E9/L (ref 0–0.4)
IMM GRANULOCYTES NFR BLD: 0.6 %
INR PPP: 2.32 (ref 0.86–1.14)
LYMPHOCYTES # BLD AUTO: 1.5 10E9/L (ref 0.8–5.3)
LYMPHOCYTES NFR BLD AUTO: 20.1 %
MCH RBC QN AUTO: 32.2 PG (ref 26.5–33)
MCHC RBC AUTO-ENTMCNC: 33.1 G/DL (ref 31.5–36.5)
MCV RBC AUTO: 97 FL (ref 78–100)
MONOCYTES # BLD AUTO: 1.2 10E9/L (ref 0–1.3)
MONOCYTES NFR BLD AUTO: 16.6 %
NEUTROPHILS # BLD AUTO: 4.2 10E9/L (ref 1.6–8.3)
NEUTROPHILS NFR BLD AUTO: 58.4 %
NRBC # BLD AUTO: 0 10*3/UL
NRBC BLD AUTO-RTO: 0 /100
PLATELET # BLD AUTO: 228 10E9/L (ref 150–450)
POTASSIUM SERPL-SCNC: 4.4 MMOL/L (ref 3.4–5.3)
RBC # BLD AUTO: 3.91 10E12/L (ref 3.8–5.2)
SODIUM SERPL-SCNC: 143 MMOL/L (ref 133–144)
WBC # BLD AUTO: 7.2 10E9/L (ref 4–11)

## 2017-12-05 PROCEDURE — 30903 CONTROL OF NOSEBLEED: CPT | Mod: 50 | Performed by: EMERGENCY MEDICINE

## 2017-12-05 PROCEDURE — 85025 COMPLETE CBC W/AUTO DIFF WBC: CPT | Performed by: EMERGENCY MEDICINE

## 2017-12-05 PROCEDURE — 25000132 ZZH RX MED GY IP 250 OP 250 PS 637: Performed by: EMERGENCY MEDICINE

## 2017-12-05 PROCEDURE — 25000125 ZZHC RX 250: Performed by: EMERGENCY MEDICINE

## 2017-12-05 PROCEDURE — 85610 PROTHROMBIN TIME: CPT | Performed by: EMERGENCY MEDICINE

## 2017-12-05 PROCEDURE — 80048 BASIC METABOLIC PNL TOTAL CA: CPT | Performed by: EMERGENCY MEDICINE

## 2017-12-05 PROCEDURE — 25000132 ZZH RX MED GY IP 250 OP 250 PS 637: Performed by: NURSE PRACTITIONER

## 2017-12-05 PROCEDURE — 96374 THER/PROPH/DIAG INJ IV PUSH: CPT | Performed by: EMERGENCY MEDICINE

## 2017-12-05 PROCEDURE — G0378 HOSPITAL OBSERVATION PER HR: HCPCS

## 2017-12-05 PROCEDURE — 99219 ZZC INITIAL OBSERVATION CARE,LEVL II: CPT | Mod: Z6 | Performed by: EMERGENCY MEDICINE

## 2017-12-05 PROCEDURE — 99285 EMERGENCY DEPT VISIT HI MDM: CPT | Mod: 25 | Performed by: EMERGENCY MEDICINE

## 2017-12-05 RX ORDER — ASPIRIN 81 MG/1
81 TABLET, CHEWABLE ORAL EVERY OTHER DAY
Status: DISCONTINUED | OUTPATIENT
Start: 2017-12-06 | End: 2017-12-06 | Stop reason: HOSPADM

## 2017-12-05 RX ORDER — OXYMETAZOLINE HYDROCHLORIDE 0.05 G/100ML
2 SPRAY NASAL DAILY PRN
Status: DISCONTINUED | OUTPATIENT
Start: 2017-12-05 | End: 2017-12-06 | Stop reason: HOSPADM

## 2017-12-05 RX ORDER — LIDOCAINE HYDROCHLORIDE 10 MG/ML
10 INJECTION, SOLUTION INFILTRATION; PERINEURAL ONCE
Status: DISCONTINUED | OUTPATIENT
Start: 2017-12-05 | End: 2017-12-05

## 2017-12-05 RX ORDER — SIMVASTATIN 20 MG
20 TABLET ORAL EVERY EVENING
Status: DISCONTINUED | OUTPATIENT
Start: 2017-12-05 | End: 2017-12-06 | Stop reason: HOSPADM

## 2017-12-05 RX ORDER — AMOXICILLIN 250 MG
2 CAPSULE ORAL 2 TIMES DAILY PRN
Status: DISCONTINUED | OUTPATIENT
Start: 2017-12-05 | End: 2017-12-06 | Stop reason: HOSPADM

## 2017-12-05 RX ORDER — DOCUSATE SODIUM 100 MG/1
100 CAPSULE, LIQUID FILLED ORAL 2 TIMES DAILY
Status: DISCONTINUED | OUTPATIENT
Start: 2017-12-05 | End: 2017-12-06 | Stop reason: HOSPADM

## 2017-12-05 RX ORDER — ASPIRIN 81 MG/1
81 TABLET, CHEWABLE ORAL DAILY
Status: DISCONTINUED | OUTPATIENT
Start: 2017-12-05 | End: 2017-12-05

## 2017-12-05 RX ORDER — METOPROLOL TARTRATE 1 MG/ML
5 INJECTION, SOLUTION INTRAVENOUS ONCE
Status: COMPLETED | OUTPATIENT
Start: 2017-12-05 | End: 2017-12-05

## 2017-12-05 RX ORDER — NALOXONE HYDROCHLORIDE 0.4 MG/ML
.1-.4 INJECTION, SOLUTION INTRAMUSCULAR; INTRAVENOUS; SUBCUTANEOUS
Status: DISCONTINUED | OUTPATIENT
Start: 2017-12-05 | End: 2017-12-06 | Stop reason: HOSPADM

## 2017-12-05 RX ORDER — ONDANSETRON 2 MG/ML
4 INJECTION INTRAMUSCULAR; INTRAVENOUS EVERY 6 HOURS PRN
Status: DISCONTINUED | OUTPATIENT
Start: 2017-12-05 | End: 2017-12-06 | Stop reason: HOSPADM

## 2017-12-05 RX ORDER — METOPROLOL TARTRATE 25 MG/1
25 TABLET, FILM COATED ORAL DAILY
Status: DISCONTINUED | OUTPATIENT
Start: 2017-12-05 | End: 2017-12-06

## 2017-12-05 RX ORDER — AMOXICILLIN 250 MG
1 CAPSULE ORAL 2 TIMES DAILY PRN
Status: DISCONTINUED | OUTPATIENT
Start: 2017-12-05 | End: 2017-12-06 | Stop reason: HOSPADM

## 2017-12-05 RX ORDER — WARFARIN SODIUM 2.5 MG/1
2.5 TABLET ORAL
Status: COMPLETED | OUTPATIENT
Start: 2017-12-05 | End: 2017-12-05

## 2017-12-05 RX ORDER — ACETAMINOPHEN 650 MG/1
650 SUPPOSITORY RECTAL EVERY 4 HOURS PRN
Status: DISCONTINUED | OUTPATIENT
Start: 2017-12-05 | End: 2017-12-05

## 2017-12-05 RX ORDER — CEPHALEXIN 500 MG/1
500 CAPSULE ORAL 2 TIMES DAILY
Status: DISCONTINUED | OUTPATIENT
Start: 2017-12-05 | End: 2017-12-06 | Stop reason: HOSPADM

## 2017-12-05 RX ORDER — ONDANSETRON 4 MG/1
4 TABLET, ORALLY DISINTEGRATING ORAL EVERY 6 HOURS PRN
Status: DISCONTINUED | OUTPATIENT
Start: 2017-12-05 | End: 2017-12-06 | Stop reason: HOSPADM

## 2017-12-05 RX ORDER — SODIUM CHLORIDE/ALOE VERA
GEL (GRAM) NASAL AT BEDTIME
Status: DISCONTINUED | OUTPATIENT
Start: 2017-12-05 | End: 2017-12-06 | Stop reason: HOSPADM

## 2017-12-05 RX ORDER — ACETAMINOPHEN 325 MG/1
650 TABLET ORAL EVERY 4 HOURS PRN
Status: DISCONTINUED | OUTPATIENT
Start: 2017-12-05 | End: 2017-12-06 | Stop reason: HOSPADM

## 2017-12-05 RX ORDER — LORAZEPAM 0.5 MG/1
0.5 TABLET ORAL AT BEDTIME
Status: DISCONTINUED | OUTPATIENT
Start: 2017-12-06 | End: 2017-12-06 | Stop reason: HOSPADM

## 2017-12-05 RX ORDER — PAROXETINE 10 MG/1
10 TABLET, FILM COATED ORAL DAILY
Status: DISCONTINUED | OUTPATIENT
Start: 2017-12-05 | End: 2017-12-06 | Stop reason: HOSPADM

## 2017-12-05 RX ORDER — OXYMETAZOLINE HYDROCHLORIDE 0.05 G/100ML
2 SPRAY NASAL 2 TIMES DAILY PRN
Status: ON HOLD | COMMUNITY
End: 2018-05-15

## 2017-12-05 RX ADMIN — ACETAMINOPHEN 650 MG: 325 TABLET, FILM COATED ORAL at 16:23

## 2017-12-05 RX ADMIN — METOPROLOL TARTRATE 5 MG: 5 INJECTION INTRAVENOUS at 07:19

## 2017-12-05 RX ADMIN — SIMVASTATIN 20 MG: 20 TABLET, FILM COATED ORAL at 20:17

## 2017-12-05 RX ADMIN — CEPHALEXIN 500 MG: 500 CAPSULE ORAL at 20:51

## 2017-12-05 RX ADMIN — METOPROLOL TARTRATE 25 MG: 25 TABLET ORAL at 12:30

## 2017-12-05 RX ADMIN — CEPHALEXIN 500 MG: 500 CAPSULE ORAL at 11:14

## 2017-12-05 RX ADMIN — PAROXETINE 10 MG: 10 TABLET, FILM COATED ORAL at 13:26

## 2017-12-05 RX ADMIN — WARFARIN SODIUM 2.5 MG: 2.5 TABLET ORAL at 17:49

## 2017-12-05 RX ADMIN — Medication 1 SPRAY: at 05:17

## 2017-12-05 ASSESSMENT — ACTIVITIES OF DAILY LIVING (ADL)
BATHING: 0-->INDEPENDENT
TRANSFERRING: 0-->INDEPENDENT
AMBULATION: 1-->ASSISTIVE EQUIPMENT
TOILETING: 0-->INDEPENDENT
SWALLOWING: 0-->SWALLOWS FOODS/LIQUIDS WITHOUT DIFFICULTY
RETIRED_EATING: 0-->INDEPENDENT
RETIRED_COMMUNICATION: 0-->UNDERSTANDS/COMMUNICATES WITHOUT DIFFICULTY
FALL_HISTORY_WITHIN_LAST_SIX_MONTHS: NO
COGNITION: 0 - NO COGNITION ISSUES REPORTED
DRESS: 0-->INDEPENDENT

## 2017-12-05 ASSESSMENT — ENCOUNTER SYMPTOMS
FEVER: 0
SHORTNESS OF BREATH: 0
ABDOMINAL PAIN: 0

## 2017-12-05 NOTE — IP AVS SNAPSHOT
Unit [address] Observation [address] [address]    Phone:  [phone]    Fax:  [phone]                                       After Visit Summary   12/5/2017    Brie Gonzalez    MRN: [id number]           After Visit Summary Signature Page     I have received my discharge instructions, and my questions have been answered. I have discussed any challenges I see with this plan with the nurse or doctor.    ..........................................................................................................................................  Patient/Patient Representative Signature      ..........................................................................................................................................  Patient Representative Print Name and Relationship to Patient    ..................................................               ................................................  Date                                            Time    ..........................................................................................................................................  Reviewed by Signature/Title    ...................................................              ..............................................  Date                                                            Time

## 2017-12-05 NOTE — LETTER
Health Information Management Services               Recipient:    [facility]      Sender:    Madonna Prater [facility] [phone] pager: [phone]        Date: December 6, 2017  Patient Name:  Brie Gonzalez  Routing Message:      Private Pay-ready today      The documents accompanying this notice contain confidential information belonging to the sender.  This information is intended only for the use of the individual or entity named above.  The authorized recipient of this information is prohibited from disclosing this information to any other party and is required to destroy the information after its stated need has been fulfilled, unless otherwise required by state law.      If you are not the intended recipient, you are hereby notified that any disclosure, copying, distribution or action taken in reliance on the contents of these documents is strictly prohibited. If you have received this document in error, please notify [facility] immediately at [phone].  You may return the document via fax ([phone]) or return mail  (Health Information Management, , [address] [place] [address]).

## 2017-12-05 NOTE — ED NOTES
Called and left message with daughter rTacey. Called [facility] for Brie regarding her apartment.

## 2017-12-05 NOTE — CONSULTS
Otolaryngology Consult Note  December 5, 2017      CC: nosebleed    HPI: Brie Gonzalez is a 89 year old female with PMH significant for atrial fibrillation on coumadin and aspirin. She had an episode of diverticulosis about 3 weeks ago and while being inpatient she develop almos daily recurrent right nasal bleeds that would stop with using noseclips. Her PCP recommended adding Afrin for when she has these episodes. Yesterday her PCP saw some excorations in her right nasal passage which he cauterized using silver nitrate. This morning at about 4 am she started bleeding once again but she noticed it was more than usual. She was brought in by ambulance to the [facility] ED. ED provider applied AFfrin and pressure without much success and decided to place bilateral merocel packings. Bleeding slowed down but continued oozing from right nostril. ENT service was consulted. Brie said this was much improved and she didn't feel like she was swallowing any more blood. Past couple of SBP on bedside monitor displayed were 160-170s.     Past Medical History:   Diagnosis Date     Atrial fibrillation (H)    - Diverticulosis    PSH: Denied    Current Outpatient Prescriptions   Medication Sig Dispense Refill     METRONIDAZOLE PO Take 500 mg by mouth every 8 hours       ATENOLOL PO Take by mouth daily       Warfarin Sodium (COUMADIN PO)        ASPIRIN PO             Allergies   Allergen Reactions     Penicillins Swelling       Social History     Social History     Marital status:      Spouse name: N/A     Number of children: N/A     Years of education: N/A     Occupational History     Not on file.     Social History Main Topics     Smoking status: Never Smoker     Smokeless tobacco: Never Used     Alcohol use Yes      Comment: Rare     Drug use: No     Sexual activity: Not on file     Other Topics Concern     Not on file     Social History Narrative   Recently , 2 years ago. Lives by herself in an assisted living facility.        No family history on file.    ROS: 12 point review of systems is negative unless noted in HPI.    PHYSICAL EXAM:  General: laying in bed, no acute distress  /74  Pulse 64  Temp 98.2  F (36.8  C) (Axillary)  Resp 14  SpO2 95%   Initial SBP were in the 170s.   HEAD: normocephalic, atraumatic  Face: symmetrical  Eyes:No spontaneous nystagmus, white sclerae.   Ears: normal auricles bilaterally  Nose:   slow ooze from right nasal package and right packing was a little past outside the right nare. Afrin was sprayed bilaterally and firm pressure was applied. Asked ED provider to have SBP within normal limits. This appeared to help completely resolve the oozing. Additional dissolvable packing consisting of thrombin, surgiflow and gelfoam was placed anterior to Merocel packings (not dissolvable) bilaterally.   Mouth: moist.   Oropharynx: uvula midline. Large clot over right side of posterior pharyngeal wall suctioned with yankaur. Once nosebleed stopped there wasn't evidence of blood dripping over the back of her throat.   Neck: supple.   Neuro: alert, oriented, answering questions appropriately.     BMP  Recent Labs  Lab 12/05/17 0425      POTASSIUM 4.4   CHLORIDE 110*   THIERRY 9.0   CO2 27   BUN 22   CR 1.17*   *     CBC  Recent Labs  Lab 12/05/17 0425   WBC 7.2   RBC 3.91   HGB 12.6   HCT 38.1   MCV 97   MCH 32.2   MCHC 33.1   RDW 14.8        INR  Recent Labs  Lab 12/05/17 0425   INR 2.32*     Electrolytes  Recent Labs  Lab 12/05/17 0425   THIERRY 9.0       Assessment and Plan  Brie Gonzalez is a 89 year old female with a past medical history of atrial fibrillation on atenolol and coumadin, who presented today with right epistaxis. Her right nasal passage was cauterized by her primary care physician yesterday and this morning at about 4 am she started bleeding again. ED provider placed bilateral Merocel packings. Once her blood pressure was within normal limits the slow oozing from her  right nasal passage stopped. Additional surgiflow with thrombin and gelfoam dissolvable packing was placed anteriorly to create an nice plug. She denied swallowing and more blood and her posterior pharyngeal wall did not show active blood dripping.     - Staph coverage while Merocels are in place. Keflex would be a good option  - [place] saline spray to bilateral packings in nose every 2-4 hours while awake  - If nosebleed occurs, spray Afrin 5-10 times into each packing and apply firm pressure with fingers for full 15 minutes without peeking, do not use nose clips as pressure provided by them is not enough. If bleeding persists repeat once more. If it continues to persist go to the ED for management.   - Continue to monitor blood pressures. Contact PCP to address blood pressure management.   - ENT clinic referral for packing removal in 5-7 days.   - Contact ENT service with any questions or concerns.     Patient discussed with staff on call, Dr. Nolasco.     Alice Alexandre, PGY-2  Otolaryngology- Head and Neck Surgery  Pager: [phone]  Please contact ENT with questions by dialing [phone] and entering job code [phone] when prompted.

## 2017-12-05 NOTE — H&P
[facility] ED Observation Admission Note    Chief Complaint   Patient presents with     Epistaxis       HPI:    Brie Gonzalez is a 89 year old female a past medical history of atrial fibrillation on atenolol and coumadin, who presented to ER with right epistaxis. Her right nasal passage was cauterized by her primary care physician yesterday, 12/4/17,  and this morning at about 4 am she started bleeding again. ED provider placed bilateral Merocel packings. Once her blood pressure was within normal limits the slow oozing from her right nasal passage stopped. ENT evaluated the patient in the ED and packed the nasal passage with additional surgiflow with thrombin and gelfoam dissolvable packing to create an nice plug. The patient was admitted to ED observation for further monitoring given anticoagulation use.     History:    Past Medical History:   Diagnosis Date     Atrial fibrillation (H)        History reviewed. No pertinent surgical history.    No family history on file.    Social History     Social History     Marital status:      Spouse name: N/A     Number of children: N/A     Years of education: N/A     Occupational History     Not on file.     Social History Main Topics     Smoking status: Never Smoker     Smokeless tobacco: Never Used     Alcohol use Yes      Comment: Rare     Drug use: No     Sexual activity: Not on file     Other Topics Concern     Not on file     Social History Narrative         No current facility-administered medications on file prior to encounter.   Current Outpatient Prescriptions on File Prior to Encounter:  METRONIDAZOLE PO Take 500 mg by mouth every 8 hours   ATENOLOL PO Take by mouth daily   Warfarin Sodium (COUMADIN PO)    ASPIRIN PO        Data:    Results for orders placed or performed during the hospital encounter of 12/05/17   Basic metabolic panel   Result Value Ref Range    Sodium 143 133 - 144 mmol/L    Potassium 4.4 3.4 - 5.3 mmol/L    Chloride 110 (H) 94 - 109 mmol/L     Carbon Dioxide 27 20 - 32 mmol/L    Anion Gap 6 3 - 14 mmol/L    Glucose 105 (H) 70 - 99 mg/dL    Urea Nitrogen 22 7 - 30 mg/dL    Creatinine 1.17 (H) 0.52 - 1.04 mg/dL    GFR Estimate 43 (L) >60 mL/min/1.7m2    GFR Estimate If Black 52 (L) >60 mL/min/1.7m2    Calcium 9.0 8.5 - 10.1 mg/dL   CBC with platelets differential   Result Value Ref Range    WBC 7.2 4.0 - 11.0 10e9/L    RBC Count 3.91 3.8 - 5.2 10e12/L    Hemoglobin 12.6 11.7 - 15.7 g/dL    Hematocrit 38.1 35.0 - 47.0 %    MCV 97 78 - 100 fl    MCH 32.2 26.5 - 33.0 pg    MCHC 33.1 31.5 - 36.5 g/dL    RDW 14.8 10.0 - 15.0 %    Platelet Count 228 150 - 450 10e9/L    Diff Method Automated Method     % Neutrophils 58.4 %    % Lymphocytes 20.1 %    % Monocytes 16.6 %    % Eosinophils 3.7 %    % Basophils 0.6 %    % Immature Granulocytes 0.6 %    Nucleated RBCs 0 0 /100    Absolute Neutrophil 4.2 1.6 - 8.3 10e9/L    Absolute Lymphocytes 1.5 0.8 - 5.3 10e9/L    Absolute Monocytes 1.2 0.0 - 1.3 10e9/L    Absolute Eosinophils 0.3 0.0 - 0.7 10e9/L    Absolute Basophils 0.0 0.0 - 0.2 10e9/L    Abs Immature Granulocytes 0.0 0 - 0.4 10e9/L    Absolute Nucleated RBC 0.0    INR   Result Value Ref Range    INR 2.32 (H) 0.86 - 1.14             EKG Interpretation:      EKG Number: not done    ROS:    Review Of Systems  Skin: negative  Eyes: negative  Ears/Nose/Throat: negative  Respiratory: No shortness of breath, dyspnea on exertion, cough, or hemoptysis  Cardiovascular: negative  Gastrointestinal: recent diverticulitis   Genitourinary: negative  Musculoskeletal: negative  Neurologic: negative  Psychiatric: anxiety  Hematologic/Lymphatic/Immunologic: negative  Endocrine: negative    PCP: Dr. Pederson     10 point ROS negative other than the symptoms noted above.      Exam:    Vitals:  B/P: 145/80, T: 98.2, P: 64, R: 14    Constitutional: healthy, alert and anxious   Head: Normocephalic. No masses, lesions, tenderness or abnormalities  Neck: Neck supple. No adenopathy. Thyroid  symmetric, normal size,, Carotids without bruits.  ENT: Nasal packing visible, no signs of overt bleeding.   Cardiovascular: negative, PMI normal. No lifts, heaves, or thrills. RRR. No murmurs, clicks gallops or rub  Respiratory: negative, Percussion normal. Good diaphragmatic excursion. Lungs clear  Gastrointestinal: Abdomen soft, non-tender. BS normal. No masses, organomegaly  : Deferred  Musculoskeletal: extremities normal- no gross deformities noted, gait normal and normal muscle tone  Skin: no suspicious lesions or rashes  Neurologic: Gait normal. Reflexes normal and symmetric. Sensation grossly WNL.  Psychiatric: mentation appears normal and anxious  Hematologic/Lymphatic/Immunologic: Negative  normal ant/post cervical, axillary, supraclavicular and inguinal nodes    Assessment/Plan:    1. Epistaxis   - ENT consulted in ED; nasal packing placed.   - Recommend Keflex course.   - [place] saline spray to bilateral packings in nose every 2-4 hours while awake  - If nosebleed occurs, spray Afrin 5-10 times into each packing and apply firm pressure with fingers for full 15 minutes without peeking, do not use nose clips as pressure provided by them is not enough. If bleeding persists repeat once more. If it continues to persist go to the ED for management.   - Continue to monitor blood pressures. Contact PCP to address blood pressure management.   - ENT clinic referral for packing removal in 5-7 days.     2. Anxiety/Depresson  -continue PTA Paxil 10 mg.    3. Atrial fibrillation, chronic warfarin use   -continue PTA warfarin and ASA 81 mg every other day (PharmD to dose warfarin)  -continue metoprolol 25 mg daily     Consults: ENT   FEN: regular diet   DVT prophylaxis: PCDs, early ambulation   Code Status: full code   Disposition: probably discharge tomorrow     Signed:  Fawn Barriga ACNP-BC   December 5, 2017 at 8:47 AM

## 2017-12-05 NOTE — ED NOTES
Coming in with epistaxis. Yesterday her right nares was cauterized. Now bedding from both nostrils. Nose clip placed by EMS.

## 2017-12-05 NOTE — PHARMACY-ADMISSION MEDICATION HISTORY
Admission medication history interview status for the 12/5/2017 admission is complete. See Epic admission navigator for allergy information, pharmacy, prior to admission medications and immunization status.     Medication history interview sources:  Patient, Care Everywhere    Changes made to PTA medication list (reason)  Added: Acetaminophen, calcium-vitamin D3, Vitamin D, metoprolol tartrate, Afrin, Simvastatin, Paroxetine (per Pt and Care Everywhere)  Deleted: atenolol (per pt switched to metoprolol), metronidazole (per pt instructed by provider to discontinue)  Changed: aspirin Po --> 81mg QOD, Warfarin 2.5mg daily (per patient)    Additional medication history information (including reliability of information, actions taken by pharmacist): Patient was a good moderate historian. Patient able to provide medication name and frequency; she was able to verify the dose after I asked her a specific dose. Verified allergies and pharmacy with patient.  - Warfarin 2.5mg daily    Anticoagulation managed by [facility]   Last INR on 12/4/17 was 2.2 (goal 2-3)  -  Patient reported provider instructed her to stop cephalexin and Flagyl about 2 weeks ago  - Paroxetine 10 mg daily prescribed on 12/4/17, but has not started therapy    Prior to Admission medications    Medication Sig Last Dose Taking? Auth Provider   METOPROLOL TARTRATE PO Take 25 mg by mouth daily 12/4/2017 at Unknown time Yes Unknown, Entered By History   ACETAMINOPHEN PO Take 325 mg by mouth every 4 hours as needed for pain 12/4/2017 at 2000 Yes Unknown, Entered By History   calcium-vitamin D (CALTRATE) 600-400 MG-UNIT per tablet Take 1 tablet by mouth daily 12/4/2017 at Unknown time Yes Unknown, Entered By History   VITAMIN D, CHOLECALCIFEROL, PO Take 1,000 Units by mouth daily 12/4/2017 at Unknown time Yes Unknown, Entered By History   oxymetazoline (AFRIN) 0.05 % spray Spray 2 sprays into both nostrils 2 times daily as needed for congestion 12/4/2017 at  Unknown time Yes Unknown, Entered By History   SIMVASTATIN PO Take 20 mg by mouth At Bedtime 12/4/2017 at Unknown time Yes Unknown, Entered By History   Warfarin Sodium (COUMADIN PO) Take 2.5 mg by mouth daily  12/4/2017 at 1800 Yes Reported, Patient   ASPIRIN PO Take 81 mg by mouth every other day  12/4/2017 at Unknown time Yes Reported, Patient   PAROXETINE HCL PO Take 10 mg by mouth daily Has not started  Unknown, Entered By History           Medication history completed by: Елена Nazario, PharmD-4

## 2017-12-05 NOTE — PHARMACY-ANTICOAGULATION SERVICE
Clinical Pharmacy - Warfarin Dosing Consult     Pharmacy has been consulted to manage this patient s warfarin therapy.  Indication: Atrial Fibrillation  Therapy Goal: INR 2-3  Warfarin Prior to Admission: Yes  Warfarin PTA Regimen: 2.5mg qday; last dose 12/4  Significant drug interactions: simvastatin, paroxetine, aspirin     INR   Date Value Ref Range Status   12/05/2017 2.32 (H) 0.86 - 1.14 Final   11/08/2017 2.39 (H) 0.86 - 1.14 Final       Recommend warfarin 2.5 mg today.  Pharmacy will monitor Brie Gonzalez daily and order warfarin doses to achieve specified goal.      Please contact pharmacy as soon as possible if the warfarin needs to be held for a procedure or if the warfarin goals change.      Estrella Hussein, PharmD

## 2017-12-05 NOTE — ED PROVIDER NOTES
History     Chief Complaint   Patient presents with     Epistaxis     HPI  Brie Gonzalez is a 89 year old female who presents with nosebleed for about an hour and a half.  She's had intermittent nosebleeds for about a week, primarily on the right side.  She saw her clinic doctor yesterday.  It was not bleeding at the time, though he cauterized a spot in her nose.  She woke up with bleeding from both nares.  She denies any cough or shortness of breath, but states it feels like the blood is pulling down the back of her throat, she is spitting it out.  She is on Coumadin, INR yesterday was 2.2.  She immediately used Afrin and placed the nose clip on the nose started bleeding this morning, though couldn't get it to stop.  No vomiting or diarrhea.  No other specific complaints.    Past Medical History:   Diagnosis Date     Atrial fibrillation (H)        History reviewed. No pertinent surgical history.    No family history on file.    Social History   Substance Use Topics     Smoking status: Never Smoker     Smokeless tobacco: Never Used     Alcohol use Yes      Comment: Rare           I have reviewed the Medications, Allergies, Past Medical and Surgical History, and Social History in the Epic system.    Review of Systems   Constitutional: Negative for fever.   HENT: Positive for nosebleeds.    Respiratory: Negative for shortness of breath.    Cardiovascular: Negative for chest pain.   Gastrointestinal: Negative for abdominal pain.   All other systems reviewed and are negative.      Physical Exam   BP: 172/67  Pulse: 64  Temp: 98.2  F (36.8  C)  Resp: 14  SpO2: 100 %      Physical Exam   Constitutional: No distress.   HENT:   Head: Atraumatic.   Mouth/Throat: No oropharyngeal exudate.   Brisk beeding from right nare   Eyes: Pupils are equal, round, and reactive to light. No scleral icterus.   Cardiovascular: Normal heart sounds and intact distal pulses.    Pulmonary/Chest: Breath sounds normal. No respiratory distress.    Abdominal: Soft. There is no tenderness.   Musculoskeletal: She exhibits no edema or tenderness.   Skin: Skin is warm. No rash noted. She is not diaphoretic.       ED Course     ED Course     Procedures             Critical Care time:  none   I lateral nares were packed using a single Merocel in each nares.  Unfortunately, there was continued oozing, despite spraying Afrin to the packing.          Labs Ordered and Resulted from Time of ED Arrival Up to the Time of Departure from the ED   BASIC METABOLIC PANEL - Abnormal; Notable for the following:        Result Value    Chloride 110 (*)     Glucose 105 (*)     Creatinine 1.17 (*)     GFR Estimate 43 (*)     GFR Estimate If Black 52 (*)     All other components within normal limits   INR - Abnormal; Notable for the following:     INR 2.32 (*)     All other components within normal limits   CBC WITH PLATELETS DIFFERENTIAL   PERIPHERAL IV CATHETER            Assessments & Plan (with Medical Decision Making)   The patient had fairly brisk bleeding, primarily from her right naris, draining down into her throat.  INR was checked yesterday, was 2.2.  She had been using the nasal clamps for over an hour without getting the bleeding to slow.  I did discuss with the patient, we did move forward with packing.  After spraying Afrin in the right naris, a Merocel, covered with bacitracin, was inserted into the right naris.  Unfortunately, there was still noticeable bleeding dripping from the packing.  The left naris was then packed.  I additionally applied direct pressure to the nose externally.  This did not stop the bleeding.  INR was rechecked, is 2.32.  Hemoglobin is normal at 12.6.  ENT was called.  They are working to get the bleeding stopped.  Her blood pressure was running a little on the high side, she was given 5 mg of metoprolol IV.  The patient will be signed out to the oncoming provider pending ENT intervention.  I would favor admitting her to the observation unit  for monitoring, presuming ENT is able to get this stopped, and doesn't need to take her to the operating room.  The patient was signed out to incoming provider at this time.    Addendum: The nose is now packed, bleeding is stopped.  Please see the ENT note for full details.  She'll be admitted to observation.  They recommend Keflex for prophylaxis.  She does report a penicillin allergy, states that her arm swelled up and she took it in the past.  I did look back, and did see in Care Everywhere that she has taken Keflex in the past, there was no allergy noted.  The patient vaguely remembers taking it, does not think she had a reaction.  We will go ahead and give her Keflex.    Dictation Disclaimer: Some of this Note has been completed with voice-recognition dictation software. Although errors are generally corrected real-time, there is the potential for a rare error to be present in the completed chart.      I have reviewed the nursing notes.    I have reviewed the findings, diagnosis, plan and need for follow up with the patient.    New Prescriptions    No medications on file       Final diagnoses:   Epistaxis       12/5/2017   [facility], [place], EMERGENCY DEPARTMENT     Yulia Bautista MD  12/05/17 0843

## 2017-12-05 NOTE — IP AVS SNAPSHOT
"     Brie Gonzalez #[id number] (CSN: [id number])  (89 year old F)  (Adm: 17)     [id number]               UNIT 6D OBSERVATION [facility]: [phone]            Patient Demographics     Patient Name Sex          Age SSN Address Phone    Brie Gonzalez Female 1925 (89 year old) [id number]   [facility] [address] [phone] (Home)      Emergency Contact(s)     Name Relation Home Work Mobile    REGINA LEW Daughter [phone]      Admission Information     Attending Provider Admitting Provider Admission Type Admission Date/Time    Yulia Bautista MD Tschohl, Melissa Ann, MD Emergency 17  0406    Discharge Date Hospital Service Auth/Cert Status Service Area     Emergency Medicine [facility]    Unit Room/Bed Admission Status       UU U6D OBSERVATION [id number] Admission (Confirmed)       Admission     Complaint    Epistaxis      Hospital Account     Name Acct ID Class Status Primary Coverage    Brie Gonzalez [id number] Observation Open HUMANA - HUMANA MEDICARE ADVANTAGE            Guarantor Account (for Hospital Account #[id number])     Name Relation to Pt Service Area Active? Acct Type    Brie Gonzalez  FCS Yes Personal/Family    Address Phone          [address] [place] [address] [phone](H)              Coverage Information (for Hospital Account #[id number])     F/O Payor/Plan Precert #    HUMANA/HUMANA MEDICARE ADVANTAGE     Subscriber Subscriber #    Brie Gonzalez [id number]    Address Phone    [address] [address]  [place] [address]                                                 INTERAGENCY TRANSFER FORM - PHYSICIAN ORDERS   2017                       UNIT 6D OBSERVATION [facility]: [phone]            Attending Provider: Yulia Bautista MD     Allergies:  Penicillins    Infection:  None   Service:  EMERGENCY ME    Ht:  1.651 m (5' 5\")   Wt:  59 kg (130 " lb)   Admission Wt:  --    BMI:  21.63 kg/m 2   BSA:  1.64 m 2            ED Clinical Impression     Diagnosis Description Comment Added By Time Added    Epistaxis [R04.0] Epistaxis [R04.0]  Yulia Bautista MD 12/5/2017  8:43 AM    Chronic atrial fibrillation (H) [I48.2] Chronic atrial fibrillation (H) [I48.2]  Merry Akins PA-C 12/6/2017  1:50 PM    Anxiety [F41.9] Anxiety [F41.9]  Merry Akins PA-C 12/6/2017  3:05 PM      Hospital Problems as of 12/6/2017              Priority Class Noted POA    Epistaxis Medium  12/5/2017 Yes      Non-Hospital Problems as of 12/6/2017     None      Code Status History     Date Active Date Inactive Code Status Order ID Comments User Context    12/6/2017  1:34 PM  Full Code [id number]  Merry Akins PA-C Outpatient    12/5/2017 11:48 AM 12/6/2017  1:34 PM Full Code [id number]  Fawn Barriga NP Inpatient      Current Code Status     Date Active Code Status Order ID Comments User Context       Prior      Summary of Visit     Reason for your hospital stay       Epistaxis                Medication Review      START taking        Dose / Directions Comments    cephALEXin 500 MG capsule   Commonly known as:  KEFLEX   Indication:  nasal packing prophylaxis        Dose:  500 mg   Take 1 capsule (500 mg) by mouth 2 times daily for 10 days   Quantity:  20 capsule   Refills:  0        LORazepam 1 MG tablet   Commonly known as:  ATIVAN   Used for:  Anxiety        Dose:  1 mg   Take 1 tablet (1 mg) by mouth every 8 hours as needed for anxiety   Quantity:  5 tablet   Refills:  0        sodium chloride 0.65 % nasal spray   Commonly known as:  OCEAN        Dose:  1 spray   Spray 1 spray into both nostrils every 2 hours   Quantity:  1 Bottle   Refills:  0          CONTINUE these medications which may have CHANGED, or have new prescriptions. If we are uncertain of the size of tablets/capsules you have at home, strength may be listed as something that might  have changed.        Dose / Directions Comments    metoprolol 25 MG tablet   Commonly known as:  LOPRESSOR   This may have changed:  how much to take   Used for:  Chronic atrial fibrillation (H)        Dose:  50 mg   Take 2 tablets (50 mg) by mouth daily   Quantity:  10 tablet   Refills:  0        * oxymetazoline 0.05 % spray   Commonly known as:  AFRIN   This may have changed:  Another medication with the same name was added. Make sure you understand how and when to take each.        Dose:  2 spray   Spray 2 sprays into both nostrils 2 times daily as needed for congestion   Refills:  0        * oxymetazoline 0.05 % spray   Commonly known as:  AFRIN   This may have changed:  You were already taking a medication with the same name, and this prescription was added. Make sure you understand how and when to take each.        Dose:  2 spray   Spray 2 sprays into both nostrils daily as needed (nasal bleeding --please page MD/NP)   Refills:  0        * Notice:  This list has 2 medication(s) that are the same as other medications prescribed for you. Read the directions carefully, and ask your doctor or other care provider to review them with you.      CONTINUE these medications which have NOT CHANGED        Dose / Directions Comments    ACETAMINOPHEN PO        Dose:  325 mg   Take 325 mg by mouth every 4 hours as needed for pain   Refills:  0        ASPIRIN PO        Dose:  81 mg   Take 81 mg by mouth every other day   Refills:  0        calcium-vitamin D 600-400 MG-UNIT per tablet   Commonly known as:  CALTRATE        Dose:  1 tablet   Take 1 tablet by mouth daily   Refills:  0        COUMADIN PO        Dose:  2.5 mg   Take 2.5 mg by mouth daily   Refills:  0        PAROXETINE HCL PO        Dose:  10 mg   Take 10 mg by mouth daily   Refills:  0        SIMVASTATIN PO        Dose:  20 mg   Take 20 mg by mouth At Bedtime   Refills:  0        VITAMIN D (CHOLECALCIFEROL) PO        Dose:  1000 Units   Take 1,000 Units by mouth  daily   Refills:  0                After Care     Activity - Up ad wilman           Advance Diet as Tolerated       Follow this diet upon discharge: regular adult diet       Encourage PO fluids           General info for SNF       Length of Stay Estimate: Short Term Care: Estimated # of Days <30  Condition at Discharge: Stable  Level of care:skilled   Rehabilitation Potential: Good  Admission H&P remains valid and up-to-date: Yes  Recent Chemotherapy: N/A  Use Nursing Home Standing Orders: Yes       Mantoux instructions       Give two-step Mantoux (PPD) Per Facility Policy Yes             Referrals     Physical Therapy Adult Consult       Evaluate and treat as clinically indicated.    Reason:  weakness             Follow-Up Appointment Instructions     Follow Up and recommended labs and tests       Follow up with primary care provider within 1 week of discharge for hospitalization follow up.    Follow up in ENT clinic with Dr. Sargent on Monday, 12/11 at 12:20PM. Please call the clinic with questions/concerns: [phone].    Otolaryngology/ENT Clinic:  [facility]  [address] [place] [address]    Follow up sooner if re-bleeding around nasal packing.    Take Keflex 500mg twice daily with food x 10 days.  Leave nasal packing in until follow up with ENT.  [place]ham saline nasal spray each nostril q2h while awake.  Afrin nasal spray 5 sprays into affected nostril as needed for nasal bleeding. If this occurs, see primary care provider.    Increase metoprolol to 50mg once daily for elevated blood pressure, which may be contributing to the nosebleed. Follow up closely with PCP for blood pressure and HR recheck and ongoing blood pressure management.    You may use Ativan 1mg as needed for anxiety if this occurs while at the TCU. I have prescribed a small supply of this. Continue taking your Paxil as prescribed by your PCP. Follow up with primary care provider for  ongoing issues with anxiety.     PCP should also recheck your INR within 1 week. Resume normal home dosing of warfarin.  Return to ED immediately if severe re-bleeding, lightheadedness, or any other new concerning symptoms.             Your next 10 appointments already scheduled     Dec 11, 2017 12:20 PM CST   (Arrive by 12:05 PM)   NEW THROAT with Jie Sargent MD   [facility] ([facility])    [address]  4th Floor  [facility] [address] [phone]           This is a multi-disciplinary care team visit as patients with your type of problem are usually seen by a team of an MD and a Speech-Language Pathologist (who is a specialist in disorders of the voice, throat, and breathing).  Please plan about 2 hours for your visit, which will likely include Laryngeal Function Studies, a Voice/Swallow/Breathing Evaluation, and an Endoscopic Laryngeal Examination to provide a comprehensive evaluation.  Please check with your insurance company to ensure you are covered for these services. - It is important to know that if you are evaluated and/or treated by both a physician and a speech pathologist during your visit, your billing will reflect the input that you receive from both providers as separate professionals. Although most insurance plans do cover these services, we encourage you to contact your insurance company prior to your visit to determine whether there are any coverage limitations that might affect you financially. - Billing/procedure codes that are frequently associated with visits to our clinic include (but are not limited to) the ones listed below. Most patients will not need all of these items, but it may be useful to ask your insurance company's patient . 01662: Flexible fiberoptic laryngoscopy, 60035: Laryngoscopy; flexible or rigid fiberoptic, with stroboscopy, 33575: Flexible endoscopic evaluation of swallowing,  "98643: Laryngeal function aerodynamic evaluation, 44155: Evaluation of Voice and Resonance, 14704: Speech pathology treatment for voice, speech, communication, 74006: Speech pathology treatment for swallowing/oral function for feeding - If you have any concerns or questions, or if you would prefer not to have a speech pathologist involved in your visit, please contact our Clinic Coordinator at [phone], at least 24 hours prior to your appointment.              Statement of Approval     Ordered          12/06/17 1612  I have reviewed and agree with all the recommendations and orders detailed in this document.  EFFECTIVE NOW     Approved and electronically signed by:  Merry Akins PA-C                                                 INTERAGENCY TRANSFER FORM - NURSING   12/5/2017                       UNIT 6D OBSERVATION [facility]: [phone]            Attending Provider: Yulia Bautista MD     Allergies:  Penicillins    Infection:  None   Service:  EMERGENCY ME    Ht:  1.651 m (5' 5\")   Wt:  59 kg (130 lb)   Admission Wt:  --    BMI:  21.63 kg/m 2   BSA:  1.64 m 2            Advance Directives        Does patient have a scanned Advance Directive/ACP document in EPIC?           Yes        Immunizations     None      ASSESSMENT     Discharge Profile Flowsheet     GASTROINTESTINAL (ADULT,PEDIATRIC,OB)     Full except areas not inspected   Hip, left;Hip, right;Buttock, left;Buttock, right;Sacrum;Coccyx 12/06/17 1128    GI WDL  WDL 12/06/17 1128   Inspection under devices  Full except (identify device(s) not inspected) 12/05/17 1222    Last Bowel Movement  12/05/17 12/05/17 2127   Skin WDL  ex 12/06/17 1128    COMMUNICATION ASSESSMENT     SAFETY      Patient's communication style  spoken language (English or Bilingual) 12/05/17 0405   Safety WDL  WDL 12/06/17 1128    SKIN     All Alarms  none present 12/06/17 1128    Inspection of bony prominences  Full except (identify areas not inspected) " "12/06/17 1128                      Assessment WDL (Within Defined Limits) Definitions           Safety WDL     Effective: 09/28/15    Row Information: <b>WDL Definition:</b> Bed in low position, wheels locked; call light in reach; upper side rails up x 2; ID band on<br> <font color=\"gray\"><i>Item=AS safety wdl>>List=AS safety wdl>>Version=F14</i></font>      Skin WDL     Effective: 09/28/15    Row Information: <b>WDL Definition:</b> Warm; dry; intact; elastic; without discoloration; pressure points without redness<br> <font color=\"gray\"><i>Item=AS skin wdl>>List=AS skin wdl>>Version=F14</i></font>      Vitals     Vital Signs Flowsheet     VITAL SIGNS     O2 Device  None (Room air) 12/05/17 1942    Temp  98.1  F (36.7  C) 12/06/17 1430   PAIN/COMFORT      Temp src  Oral 12/06/17 1430   Patient Currently in Pain  denies 12/05/17 2127    Resp  16 12/06/17 1430   Preferred Pain Scale  CAPA (Clinically Aligned Pain Assessment) ([facility], [facility] Adults Only) 12/05/17 2127    Pulse  67 12/06/17 1430   POSITIONING      Heart Rate  93 12/06/17 0809   Body Position  independently positioning 12/06/17 1128    Pulse/Heart Rate Source  Monitor 12/06/17 1430   Head of Bed (HOB)  HOB at 30-45 degrees 12/06/17 1128    BP  119/59 12/06/17 1430   Chair  Upright in chair 12/05/17 1151    BP Location  Right arm 12/06/17 1430   DAILY CARE      OXYGEN THERAPY     Activity Management  activity adjusted per tolerance 12/06/17 1128    SpO2  98 % 12/06/17 1430   Activity Assistance Provided  independent 12/06/17 1128            Patient Lines/Drains/Airways Status    Active LINES/DRAINS/AIRWAYS     Name: Placement date: Placement time: Site: Days: Last dressing change:    Peripheral IV 12/05/17 Right Lower forearm 12/05/17   0440   Lower forearm   1             Patient Lines/Drains/Airways Status    Active PICC/CVC     None            Intake/Output Detail Report     None      Last Void/BM       Most Recent Value    Urine Occurrence 1 " at 12/06/2017 0000    Stool Occurrence       Case Management/Discharge Planning     Case Management/Discharge Planning Flowsheet     LIVING ENVIRONMENT     ABUSE RISK SCREEN      Lives With  alone 12/05/17 1223   QUESTION TO PATIENT:  Has a member of your family or a partner(now or in the past) intimidated, hurt, manipulated, or controlled you in any way?  no 12/05/17 0410    COPING/STRESS     QUESTION TO PATIENT: Do you feel safe going back to the place where you are living?  yes 12/05/17 0410    Major Change/Loss/Stressor  illness 12/05/17 1226   OBSERVATION: Is there reason to believe there has been maltreatment of a vulnerable adult (ie. Physical/Sexual/Emotional abuse, self neglect, lack of adequate food, shelter, medical care, or financial exploitation)?  no 12/05/17 0410    MH/BH CAREGIVER     (R) MENTAL HEALTH SUICIDE RISK      Filed Complexity Screen Score  3 12/06/17 0846   Are you depressed or being treated for depression?  Yes 12/05/17 1224                  UNIT 6D OBSERVATION [facility]: [phone]            Medication Administration Report for Brie Gonzalez as of 12/06/17 2940   Legend:    Given Hold Not Given Due Canceled Entry Other Actions    Time Time (Time) Time  Time-Action       Inactive    Active    Linked        Medications 11/30/17 12/01/17 12/02/17 12/03/17 12/04/17 12/05/17 12/06/17    acetaminophen (TYLENOL) tablet 650 mg  Dose: 650 mg Freq: EVERY 4 HOURS PRN Route: PO  PRN Reason: mild pain  Start: 12/05/17 1148   Admin Instructions: Alternate ibuprofen (if ordered) with acetaminophen.  Maximum acetaminophen dose from all sources = 75 mg/kg/day not to exceed 4 grams/day.          1623 (650 mg)-Given            aspirin chewable tablet 81 mg  Dose: 81 mg Freq: EVERY OTHER DAY Route: PO  Start: 12/06/17 0900          0927 (81 mg)-Given           cephALEXin (KEFLEX) capsule 500 mg  Dose: 500 mg Freq: 2 TIMES DAILY Route: PO  Indications Comment: nasal packing prophylaxis  Start:  12/05/17 0842         1114 (500 mg)-Given       2051 (500 mg)-Given        0928 (500 mg)-Given       [ ] 2000           docusate sodium (COLACE) capsule 100 mg  Dose: 100 mg Freq: 2 TIMES DAILY Route: PO  Start: 12/05/17 1200   Admin Instructions: To prevent constipation.  Hold for loose stools.          (1227)-Not Given       (2018)-Not Given        (0928)-Not Given       [ ] 2000           LORazepam (ATIVAN) tablet 0.5 mg  Dose: 0.5 mg Freq: AT BEDTIME Route: PO  Start: 12/06/17 0000          0035 (0.5 mg)-Given       [ ] 2200           metoprolol (LOPRESSOR) tablet 50 mg  Dose: 50 mg Freq: DAILY Route: PO  Start: 12/06/17 0900          0927 (50 mg)-Given           naloxone (NARCAN) injection 0.1-0.4 mg  Dose: 0.1-0.4 mg Freq: EVERY 2 MIN PRN Route: IV  PRN Reason: opioid reversal  Start: 12/05/17 1148   Admin Instructions: For respiratory rate LESS than or EQUAL to 8.  Partial reversal dose:  0.1 mg titrated q 2 minutes for Analgesia Side Effects Monitoring Sedation Level of 3 (frequently drowsy, arousable, drifts to sleep during conversation).Full reversal dose:  0.4 mg bolus for Analgesia Side Effects Monitoring Sedation Level of 4 (somnolent, minimal or no response to stimulation).  For ordered doses up to 2mg give IVP. Give each 0.4mg over 15 seconds in emergency situations. For non-emergent situations further dilute in 9mL of NS to facilitate titration of response.               ondansetron (ZOFRAN-ODT) ODT tab 4 mg  Dose: 4 mg Freq: EVERY 6 HOURS PRN Route: PO  PRN Reasons: nausea,vomiting  Start: 12/05/17 1148   Admin Instructions: This is Step 1 of nausea and vomiting management.  If nausea not resolved in 15 minutes, go to Step 2 prochlorperazine (COMPAZINE). Do not push through foil backing. Peel back foil and gently remove. Place on tongue immediately. Administration with liquid unnecessary              Or  ondansetron (ZOFRAN) injection 4 mg  Dose: 4 mg Freq: EVERY 6 HOURS PRN Route: IV  PRN Reasons:  nausea,vomiting  Start: 12/05/17 1148   Admin Instructions: This is Step 1 of nausea and vomiting management.  If nausea not resolved in 15 minutes, go to Step 2 prochlorperazine (COMPAZINE).  Irritant. For ordered doses up to 4 mg, give IV Push undiluted over 2-5 minutes.               oxymetazoline (AFRIN) 0.05 % spray 2 spray  Dose: 2 spray Freq: DAILY PRN Route: BOTH NOSTRIL  PRN Comment: nasal bleeding --please page MD/NP  Start: 12/05/17 1250   Admin Instructions: Use for more than 3 consecutive days may cause rebound vasodilation.               PARoxetine (PAXIL) tablet 10 mg  Dose: 10 mg Freq: DAILY Route: PO  Start: 12/05/17 1300         1326 (10 mg)-Given        (0927)-Not Given       [ ] 2000           saline nasal (AYR SALINE) topical gel  Freq: AT BEDTIME Route: EACH NARE  Start: 12/05/17 2200         (2200)-Not Given        [ ] 2200           senna-docusate (SENOKOT-S;PERICOLACE) 8.6-50 MG per tablet 1 tablet  Dose: 1 tablet Freq: 2 TIMES DAILY PRN Route: PO  PRN Reason: constipation  Start: 12/05/17 1148   Admin Instructions: If no bowel movement in 24 hours, increase to 2 tablets PO.  Hold for loose stools.  This is the first step of a three step constipation treatment.              Or  senna-docusate (SENOKOT-S;PERICOLACE) 8.6-50 MG per tablet 2 tablet  Dose: 2 tablet Freq: 2 TIMES DAILY PRN Route: PO  PRN Reason: constipation  Start: 12/05/17 1148   Admin Instructions: Hold for loose stools.  This is the first step of a three step constipation treatment.               simvastatin (ZOCOR) tablet 20 mg  Dose: 20 mg Freq: EVERY EVENING Route: PO  Start: 12/05/17 2000 2017 (20 mg)-Given        [ ] 2000           sodium chloride (OCEAN) 0.65 % nasal spray 2 spray  Dose: 2 spray Freq: EVERY 2 HOURS Route: BOTH NOSTRIL  Start: 12/05/17 1400   Admin Instructions: While awake          (1423)-Not Given       (1502)-Not Given       (1747)-Not Given       (2018)-Not Given       (2200)-Not Given         (0001)-Not Given       (0212)-Not Given       (0401)-Not Given       (0600)-Not Given       0928 (2 spray)-Given              1412 (2 spray)-Given              1615 (2 spray)-Given       1740 (2 spray)-Given       [ ] 2000       [ ] 2200           Warfarin Therapy Reminder (Check START DATE - warfarin may be starting in the FUTURE)  Freq: CONTINUOUS PRN Route: XX  Start: 12/05/17 1429   Admin Instructions: *Note to reorder warfarin daily*  Pharmacy Warfarin Dosing Service  Patient is on Warfarin Therapy - check for daily order              Completed Medications  Medications 11/30/17 12/01/17 12/02/17 12/03/17 12/04/17 12/05/17 12/06/17         Dose: 5 mg Freq: ONCE Route: IV  Start: 12/05/17 0650   End: 12/05/17 0724   Admin Instructions: For ordered doses up to 15 mg, give IV Push undiluted over 5-10 minutes.          0719 (5 mg)-Given              Dose: 1 spray Freq: ONCE Route: NA  Start: 12/05/17 0445   End: 12/05/17 0517         0517 (1 spray)-Given by Other              Dose: 2.5 mg Freq: ONCE AT 6PM Route: PO  Start: 12/06/17 1800   End: 12/06/17 1740          1740 (2.5 mg)-Given             Dose: 2.5 mg Freq: ONCE AT 6PM Route: PO  Start: 12/05/17 1800   End: 12/05/17 1749         1749 (2.5 mg)-Given           Discontinued Medications  Medications 11/30/17 12/01/17 12/02/17 12/03/17 12/04/17 12/05/17 12/06/17         Dose: 650 mg Freq: EVERY 4 HOURS PRN Route: RE  PRN Reason: mild pain  Start: 12/05/17 1148   End: 12/05/17 2246   Admin Instructions: Alternate ibuprofen (if ordered) with acetaminophen.  Maximum acetaminophen dose from all sources = 75 mg/kg/day not to exceed 4 grams/day.          2246-Med Discontinued          Dose: 81 mg Freq: DAILY Route: PO  Start: 12/05/17 1445   End: 12/05/17 1551         (1504)-Not Given       1551-Med Discontinued          Dose: 10 mL Freq: ONCE Route: ID  Start: 12/05/17 0548   End: 12/05/17 1148         1148-Med Discontinued  (1211)-Not Given              Dose: 25 mg  Freq: DAILY Route: PO  Start: 12/05/17 1215   End: 12/06/17 0845         1230 (25 mg)-Given        0845-Med Discontinued  (0922)-Not Given             Dose: 20 mg Freq: AT BEDTIME Route: PO  Start: 12/06/17 2200   End: 12/06/17 1334          1334-Med Discontinued         Dose: 1 spray Freq: EVERY 2 HOURS Route: BOTH NOSTRIL  Start: 12/06/17 0845   End: 12/06/17 0846          0846-Med Discontinued  (0928)-Not Given        Medications 11/30/17 12/01/17 12/02/17 12/03/17 12/04/17 12/05/17 12/06/17               INTERAGENCY TRANSFER FORM - NOTES (H&P, Discharge Summary, Consults, Procedures, Therapies)   12/5/2017                       UNIT 6D OBSERVATION [facility] BANK: [phone]               History & Physicals      H&P by Fawn Barriga NP at 12/5/2017  8:47 AM     Author:  Fawn Barriga NP Service:  Emergency Medicine Author Type:  Nurse Practitioner    Filed:  12/5/2017  4:39 PM Date of Service:  12/5/2017  8:47 AM Creation Time:  12/5/2017  8:47 AM    Status:  Attested :  Fawn Barriga NP (Nurse Practitioner)    Cosigner:  Yulia Bautista MD at 12/5/2017 11:11 PM        Attestation signed by Yulia Bautista MD at 12/5/2017 11:11 PM        Attestation:  Physician Attestation   I, Yulia Bautista, saw and evaluated Brie Gonzalez as part of a shared visit.  I have reviewed and discussed with the advanced practice provider their history, physical and plan.    I personally reviewed the vital signs, medications and labs.    My key history or physical exam findings: Please see my separate note from the same date.     Key management decisions made by me: Please see my separate note from the same date.     Yulia Bautista  Date of Service (when I saw the patient): 12/05/17                               [facility] ED Observation Admission Note[KS1.1]    Chief Complaint   Patient presents with     Epistaxis[KS1.2]       HPI:    Brie Gonzalez is a 89 year old female a past medical  history of atrial fibrillation on atenolol and coumadin, who presented to ER with right epistaxis. Her right nasal passage was cauterized by her primary care physician yesterday, 12/4/17,  and this morning at about 4 am she started bleeding again. ED provider placed bilateral Merocel packings. Once her blood pressure was within normal limits the slow oozing from her right nasal passage stopped.[KS1.1] ENT evaluated the patient in the ED and packed the nasal passage with additional[KS1.3] surgiflow with thrombin and gelfoam dissolvable packing to create an nice plug.[KS1.1] The patient was admitted to ED observation for further monitoring given anticoagulation use.[KS1.3]     History:[KS1.1]    Past Medical History:   Diagnosis Date     Atrial fibrillation (H)        History reviewed. No pertinent surgical history.    No family history on file.    Social History     Social History     Marital status:      Spouse name: N/A     Number of children: N/A     Years of education: N/A     Occupational History     Not on file.     Social History Main Topics     Smoking status: Never Smoker     Smokeless tobacco: Never Used     Alcohol use Yes      Comment: Rare     Drug use: No     Sexual activity: Not on file     Other Topics Concern     Not on file     Social History Narrative         No current facility-administered medications on file prior to encounter.   Current Outpatient Prescriptions on File Prior to Encounter:  METRONIDAZOLE PO Take 500 mg by mouth every 8 hours   ATENOLOL PO Take by mouth daily   Warfarin Sodium (COUMADIN PO)    ASPIRIN PO[KS1.2]        Data:[KS1.1]    Results for orders placed or performed during the hospital encounter of 12/05/17   Basic metabolic panel   Result Value Ref Range    Sodium 143 133 - 144 mmol/L    Potassium 4.4 3.4 - 5.3 mmol/L    Chloride 110 (H) 94 - 109 mmol/L    Carbon Dioxide 27 20 - 32 mmol/L    Anion Gap 6 3 - 14 mmol/L    Glucose 105 (H) 70 - 99 mg/dL    Urea Nitrogen  22 7 - 30 mg/dL    Creatinine 1.17 (H) 0.52 - 1.04 mg/dL    GFR Estimate 43 (L) >60 mL/min/1.7m2    GFR Estimate If Black 52 (L) >60 mL/min/1.7m2    Calcium 9.0 8.5 - 10.1 mg/dL   CBC with platelets differential   Result Value Ref Range    WBC 7.2 4.0 - 11.0 10e9/L    RBC Count 3.91 3.8 - 5.2 10e12/L    Hemoglobin 12.6 11.7 - 15.7 g/dL    Hematocrit 38.1 35.0 - 47.0 %    MCV 97 78 - 100 fl    MCH 32.2 26.5 - 33.0 pg    MCHC 33.1 31.5 - 36.5 g/dL    RDW 14.8 10.0 - 15.0 %    Platelet Count 228 150 - 450 10e9/L    Diff Method Automated Method     % Neutrophils 58.4 %    % Lymphocytes 20.1 %    % Monocytes 16.6 %    % Eosinophils 3.7 %    % Basophils 0.6 %    % Immature Granulocytes 0.6 %    Nucleated RBCs 0 0 /100    Absolute Neutrophil 4.2 1.6 - 8.3 10e9/L    Absolute Lymphocytes 1.5 0.8 - 5.3 10e9/L    Absolute Monocytes 1.2 0.0 - 1.3 10e9/L    Absolute Eosinophils 0.3 0.0 - 0.7 10e9/L    Absolute Basophils 0.0 0.0 - 0.2 10e9/L    Abs Immature Granulocytes 0.0 0 - 0.4 10e9/L    Absolute Nucleated RBC 0.0    INR   Result Value Ref Range    INR 2.32 (H) 0.86 - 1.14[KS1.2]             EKG Interpretation:      EKG Number:[KS1.1] not done[KS1.3]    ROS:    Review Of Systems  Skin:[KS1.1] negative[KS1.3]  Eyes:[KS1.1] negative[KS1.3]  Ears/Nose/Throat:[KS1.1] negative[KS1.3]  Respiratory:[KS1.1] No shortness of breath, dyspnea on exertion, cough, or hemoptysis[KS1.3]  Cardiovascular:[KS1.1] negative[KS1.3]  Gastrointestinal:[KS1.1] recent diverticulitis[KS1.3]   Genitourinary:[KS1.1] negative[KS1.3]  Musculoskeletal:[KS1.1] negative[KS1.3]  Neurologic:[KS1.1] negative[KS1.3]  Psychiatric:[KS1.1] anxiety[KS1.3]  Hematologic/Lymphatic/Immunologic:[KS1.1] negative[KS1.3]  Endocrine:[KS1.1] negative[KS1.3]    PCP:[KS1.1] Dr. Pederson[KS1.3]     10 point ROS negative other than the symptoms noted above.      Exam:    Vitals:  B/P: 145/80, T: 98.2, P: 64, R: 14    Constitutional:[KS1.1] healthy, alert and anxious[KS1.3]    Head:[KS1.1] Normocephalic. No masses, lesions, tenderness or abnormalities[KS1.3]  Neck:[KS1.1] Neck supple. No adenopathy. Thyroid symmetric, normal size,, Carotids without bruits.[KS1.3]  ENT:[KS1.1] Nasal packing visible, no signs of overt bleeding.[KS1.3]   Cardiovascular:[KS1.1] negative, PMI normal. No lifts, heaves, or thrills. RRR. No murmurs, clicks gallops or rub[KS1.3]  Respiratory:[KS1.1] negative, Percussion normal. Good diaphragmatic excursion. Lungs clear[KS1.3]  Gastrointestinal:[KS1.1] Abdomen soft, non-tender. BS normal. No masses, organomegaly[KS1.3]  :[KS1.1] Deferred[KS1.3]  Musculoskeletal:[KS1.1] extremities normal- no gross deformities noted, gait normal and normal muscle tone[KS1.3]  Skin:[KS1.1] no suspicious lesions or rashes[KS1.3]  Neurologic:[KS1.1] Gait normal. Reflexes normal and symmetric. Sensation grossly WNL.[KS1.3]  Psychiatric:[KS1.1] mentation appears normal and anxious[KS1.3]  Hematologic/Lymphatic/Immunologic:[KS1.1] Negative  normal ant/post cervical, axillary, supraclavicular and inguinal nodes[KS1.3]    Assessment/Plan:    1. Epistaxis   - ENT consulted in ED[KS1.1]; nasal packing placed.[KS1.4]   -[KS1.1] Recommend Keflex course.[KS1.4]   - [place] saline spray to bilateral packings in nose every 2-4 hours while awake  - If nosebleed occurs, spray Afrin 5-10 times into each packing and apply firm pressure with fingers for full 15 minutes without peeking, do not use nose clips as pressure provided by them is not enough. If bleeding persists repeat once more. If it continues to persist go to the ED for management.   - Continue to monitor blood pressures. Contact PCP to address blood pressure management.   - ENT clinic referral for packing removal in 5-7 days.     2.[KS1.1] Anxiety/Depresson  -continue PTA Paxil 10 mg.[KS1.4]    3.[KS1.1] Atrial fib[KS1.4]rillation, chronic warfarin use[KS1.5]   -continue[KS1.4] PTA[KS1.5] warfarin and ASA[KS1.4] 81 mg[KS1.6] every other  day (PharmD to dose warfarin)[KS1.7]  -continue metoprolol 25 mg daily[KS1.6]     Consults:[KS1.1] ENT[KS1.3]   FEN:[KS1.1] regular diet[KS1.3]   DVT prophylaxis[KS1.1]: PCDs, early ambulation[KS1.3]   Code Status:[KS1.1] full code[KS1.8]   Disposition:[KS1.1] probably discharge tomorrow[KS1.8]     Signed:[KS1.1]  Fawn Barriga ACNP-BC[KS1.8]   December 5, 2017 at 8:47 AM[KS1.1]         Revision History        User Key Date/Time User Provider Type Action    > KS1.7 12/5/2017  4:39 PM Fawn Barriga, NP Nurse Practitioner Sign     KS1.6 12/5/2017  2:31 PM Fawn Barriga, NP Nurse Practitioner Sign     [N/A] 12/5/2017  2:29 PM Fawn Barriga, NP Nurse Practitioner Sign     [N/A] 12/5/2017  2:28 PM Soniya Barrigaa B, NP Nurse Practitioner Sign     KS1.5 12/5/2017  2:28 PM Fawn Barriga B, NP Nurse Practitioner Sign     KS1.4 12/5/2017  2:26 PM Fawn Barriga B, NP Nurse Practitioner Share     KS1.2 12/5/2017 12:50 PM Fawn Barriga, NP Nurse Practitioner Share     KS1.3 12/5/2017 12:42 PM Fawn Barriga, NP Nurse Practitioner      KS1.8 12/5/2017 12:26 PM Soniya Barrigaa B, NP Nurse Practitioner Share     KS1.1 12/5/2017  8:50 AM Fawn Barriga, NP Nurse Practitioner Share                     Discharge Summaries      Discharge Summaries by Merry Akins PA-C at 12/6/2017  1:37 PM     Author:  Merry Akins PA-C Service:  Emergency Medicine Author Type:  Physician Assistant - RIN    Filed:  12/6/2017  4:20 PM Date of Service:  12/6/2017  1:37 PM Creation Time:  12/6/2017  1:37 PM    Status:  Cosign Needed :  Merry Akins PA-C (Physician Assistant - C)    Cosign Required:  Yes             Discharge Summary    Brie Gonzalez MRN# [id number]   YOB: 1925 Age: 89 year old     Date of Admission:  12/5/2017  Date of Discharge:[AT1.1]  12/6/2017[AT1.2]  Admitting Physician:  Yulia Bautista MD  Discharge  Physician:  Yulia Bautista MD  Discharging Service:  ED observation     Primary Provider: Philip Pederson          Discharge Diagnosis:[AT1.1]     Epistaxis[AT1.3]                Discharge Disposition:[AT1.1]   Discharged to short-term care facility[AT1.3]           Condition on Discharge:   Discharge condition:[AT1.1] Stable[AT1.3]   Code status on discharge:[AT1.1] Full Code[AT1.3]           Procedures:[AT1.1]   Nasal packing in ED, performed by ENT[AT1.3]          Discharge Medications:[AT1.1]     Current Discharge Medication List      START taking these medications    Details   cephALEXin (KEFLEX) 500 MG capsule Take 1 capsule (500 mg) by mouth 2 times daily for 10 days  Qty: 20 capsule, Refills: 0    Associated Diagnoses: Epistaxis      !! oxymetazoline (AFRIN) 0.05 % spray Spray 2 sprays into both nostrils daily as needed (nasal bleeding --please page MD/NP)  Refills: 0    Associated Diagnoses: Epistaxis      sodium chloride (OCEAN) 0.65 % nasal spray Spray 1 spray into both nostrils every 2 hours  Qty: 1 Bottle, Refills: 0    Associated Diagnoses: Epistaxis      LORazepam (ATIVAN) 1 MG tablet Take 1 tablet (1 mg) by mouth every 8 hours as needed for anxiety  Qty: 5 tablet, Refills: 0    Associated Diagnoses: Anxiety       !! - Potential duplicate medications found. Please discuss with provider.      CONTINUE these medications which have CHANGED    Details   metoprolol (LOPRESSOR) 25 MG tablet Take 2 tablets (50 mg) by mouth daily  Qty: 10 tablet, Refills: 0    Associated Diagnoses: Chronic atrial fibrillation (H)         CONTINUE these medications which have NOT CHANGED    Details   ACETAMINOPHEN PO Take 325 mg by mouth every 4 hours as needed for pain      calcium-vitamin D (CALTRATE) 600-400 MG-UNIT per tablet Take 1 tablet by mouth daily      VITAMIN D, CHOLECALCIFEROL, PO Take 1,000 Units by mouth daily      !! oxymetazoline (AFRIN) 0.05 % spray Spray 2 sprays into both nostrils 2 times daily as  needed for congestion      SIMVASTATIN PO Take 20 mg by mouth At Bedtime      Warfarin Sodium (COUMADIN PO) Take 2.5 mg by mouth daily       ASPIRIN PO Take 81 mg by mouth every other day       PAROXETINE HCL PO Take 10 mg by mouth daily       !! - Potential duplicate medications found. Please discuss with provider.[AT1.4]                Consultations:[AT1.1]   ENT[AT1.3]  Social work[AT1.5]             Brief History of Illness:[AT1.1]   Brie Gonzalez is a 89 year old female a past medical history significant for atrial fibrillation on coumadin, who presented to ER with right epistaxis.[AT1.3]           Hospital Course:[AT1.1]   1. Epistaxis. ENT consulted in ED; nasal packing placed. There was no further significant re-bleeding during a 24+ hour observation period. Serial hemoglobins stable at 12.8, 12.1. INR 2.57. ENT recommended Keflex course for Staph prevention while nasal packing in place. Recommended [place] saline spray to bilateral packings in nose every 2-4 hours while awake. Afrin spray if rebleeding, which was not necessary.   Recommended continued monitoring of her hypertension as she did have elevated blood pressures throughout her stay- up to a maximum of   We did increase her metoprolol to 50mg once daily as her HR was normal and blood pressure remained elevated. She should follow up with PCP for further blood pressure management.  She will follow up in ENT clinic-  referral placed - for packing removal in 5-7 days.      2. Anxiety/Depresson. Continued PTA Paxil 10 mg.[AT1.3] Did have an anxious episode last night for which she received Ativan 1mg and had good results. Requested prescription for Ativan at time of discharge. I provided a small script and discussed use/side effects with patient and family member.[AT1.5]     3. Atrial fibrillation, chronic warfarin use. Continued PTA warfarin and ASA 81 mg every other day. INR stable 2.5, 2.57.  Increased Metoprolol to 50mg BID as above.    4. Disposition.  There was concern for discharge planning amongst patient and her daughter. She was seen by PT and was deemed medically safe for discharge to home. However, patient and family continued to request TCU placement. Social work was consulted and arranged for TCU with private pay.[AT1.3]            Final Day of Progress before Discharge:       Physical Exam:[AT1.1]  Blood pressure 119/59, pulse 67, temperature 98.1  F (36.7  C), temperature source Oral, resp. rate 16, SpO2 98 %, not currently breastfeeding.[AT1.4]    General: alert, appears comfortable seated in bed, NAD. Afebrile.  Skin: no pallor.  Head: Normocephalic.  EENT: Nose: bilateral nares packed. No oozing around packing.  Oropharynx: MMM. No bleeding in posterior pharynx.   Neck: Neck supple.  Respiratory: No distress. Equal inspiration to bilateral bases. Lungs CTAB.  Cardiovascular:  RRR. No murmurs, clicks gallops or rub. DP pulses 2+ bilaterally.   Gastrointestinal: Abdomen soft,  Musculoskeletal: extremities normal- no gross deformities noted, gait normal and normal muscle tone   Neurologic: Follows commands.   Psychiatric: mentation appears normal. Anxious mood.[AT1.3]             Data:  All laboratory data reviewed             Significant Results:[AT1.1]     Results for orders placed or performed during the hospital encounter of 12/05/17   Basic metabolic panel   Result Value Ref Range    Sodium 143 133 - 144 mmol/L    Potassium 4.4 3.4 - 5.3 mmol/L    Chloride 110 (H) 94 - 109 mmol/L    Carbon Dioxide 27 20 - 32 mmol/L    Anion Gap 6 3 - 14 mmol/L    Glucose 105 (H) 70 - 99 mg/dL    Urea Nitrogen 22 7 - 30 mg/dL    Creatinine 1.17 (H) 0.52 - 1.04 mg/dL    GFR Estimate 43 (L) >60 mL/min/1.7m2    GFR Estimate If Black 52 (L) >60 mL/min/1.7m2    Calcium 9.0 8.5 - 10.1 mg/dL   CBC with platelets differential   Result Value Ref Range    WBC 7.2 4.0 - 11.0 10e9/L    RBC Count 3.91 3.8 - 5.2 10e12/L    Hemoglobin 12.6 11.7 - 15.7 g/dL    Hematocrit 38.1 35.0  - 47.0 %    MCV 97 78 - 100 fl    MCH 32.2 26.5 - 33.0 pg    MCHC 33.1 31.5 - 36.5 g/dL    RDW 14.8 10.0 - 15.0 %    Platelet Count 228 150 - 450 10e9/L    Diff Method Automated Method     % Neutrophils 58.4 %    % Lymphocytes 20.1 %    % Monocytes 16.6 %    % Eosinophils 3.7 %    % Basophils 0.6 %    % Immature Granulocytes 0.6 %    Nucleated RBCs 0 0 /100    Absolute Neutrophil 4.2 1.6 - 8.3 10e9/L    Absolute Lymphocytes 1.5 0.8 - 5.3 10e9/L    Absolute Monocytes 1.2 0.0 - 1.3 10e9/L    Absolute Eosinophils 0.3 0.0 - 0.7 10e9/L    Absolute Basophils 0.0 0.0 - 0.2 10e9/L    Abs Immature Granulocytes 0.0 0 - 0.4 10e9/L    Absolute Nucleated RBC 0.0    INR   Result Value Ref Range    INR 2.32 (H) 0.86 - 1.14   Basic metabolic panel   Result Value Ref Range    Sodium 142 133 - 144 mmol/L    Potassium 4.0 3.4 - 5.3 mmol/L    Chloride 109 94 - 109 mmol/L    Carbon Dioxide 25 20 - 32 mmol/L    Anion Gap 9 3 - 14 mmol/L    Glucose 92 70 - 99 mg/dL    Urea Nitrogen 19 7 - 30 mg/dL    Creatinine 0.95 0.52 - 1.04 mg/dL    GFR Estimate 55 (L) >60 mL/min/1.7m2    GFR Estimate If Black 66 >60 mL/min/1.7m2    Calcium 8.9 8.5 - 10.1 mg/dL   Hemoglobin   Result Value Ref Range    Hemoglobin 12.1 11.7 - 15.7 g/dL   INR   Result Value Ref Range    INR 2.57 (H) 0.86 - 1.14[AT1.4]      No results found for this or any previous visit (from the past 48 hour(s)).             Pending Results:[AT1.1]   Unresulted Labs Ordered in the Past 30 Days of this Admission     No orders found for last 61 day(s).[AT1.4]                  Discharge Instructions and Follow-Up:[AT1.1]     Discharge Procedure Orders  General info for SNF   Order Comments: Length of Stay Estimate: Short Term Care: Estimated # of Days <30  Condition at Discharge: Stable  Level of care:skilled   Rehabilitation Potential: Good  Admission H&P remains valid and up-to-date: Yes  Recent Chemotherapy: N/A  Use Nursing Home Standing Orders: Yes     Tigrex instructions   Order  Comments: Give two-step Mantoux (PPD) Per Facility Policy Yes     Activity - Up ad wilman   Order Specific Question Answer Comments   Is discharge order? Yes      Reason for your hospital stay   Order Comments: Epistaxis     Encourage PO fluids     Follow Up and recommended labs and tests   Order Comments: Follow up with primary care provider within 1 week of discharge for hospitalization follow up.    Follow up in ENT clinic with Dr. Sargent on Monday, 12/11 at 12:20PM. Please call the clinic with questions/concerns: [phone].    Otolaryngology/ENT Clinic:  [facility]  [address] [place], [place] [id number]    Follow up sooner if re-bleeding around nasal packing.    Take Keflex 500mg twice daily with food x 10 days.  Leave nasal packing in until follow up with ENT.  [place] saline nasal spray each nostril q2h while awake.  Afrin nasal spray 5 sprays into affected nostril as needed for nasal bleeding. If this occurs, see primary care provider.    Increase metoprolol to 50mg once daily for elevated blood pressure, which may be contributing to the nosebleed. Follow up closely with PCP for blood pressure and HR recheck and ongoing blood pressure management.    You may use Ativan 1mg as needed for anxiety if this occurs while at the TCU. I have prescribed a small supply of this. Continue taking your Paxil as prescribed by your PCP. Follow up with primary care provider for ongoing issues with anxiety.     PCP should also recheck your INR within 1 week. Resume normal home dosing of warfarin.  Return to ED immediately if severe re-bleeding, lightheadedness, or any other new concerning symptoms.     Full Code     Physical Therapy Adult Consult   Order Comments: Evaluate and treat as clinically indicated.    Reason:  weakness     Advance Diet as Tolerated   Order Comments: Follow this diet upon discharge: regular adult diet   Order Specific Question Answer Comments    Is discharge order? Yes[AT1.4]             Attestation:  Merry Akins PA-C  12/06/17[AT1.1]                     Revision History        User Key Date/Time User Provider Type Action    > AT1.4 12/6/2017  4:20 PM Merry Akins PA-C Physician Assistant Char GAR Sign     AT1.5 12/6/2017  4:17 PM Merry Akins PA-C Physician Assistant Char GAR      AT1.2 12/6/2017  1:41 PM Merry Akins PA-C Physician Assistant Char GAR      AT1.3 12/6/2017  1:40 PM Merry Akins PA-C Physician Assistant Char GAR      AT1.1 12/6/2017  1:37 PM Merry Akins PA-C Physician Assistant - C                      Consult Notes      Consults by Alice Norton MD at 12/5/2017  8:07 AM     Author:  Alice Norton MD Service:  Otolaryngology/ENT Author Type:  Resident    Filed:  12/5/2017  8:27 AM Date of Service:  12/5/2017  8:07 AM Creation Time:  12/5/2017  8:07 AM    Status:  Attested :  Alice Norton MD (Resident)    Cosigner:  Enriqueta Nolasco MD at 12/5/2017  8:35 AM        Attestation signed by Enriqueta Nolasco MD at 12/5/2017  8:35 AM        Attestation:  Physician Attestation   I did not see the patient on this date.  Her history, physical exam and plan was discussed with me and I agree with the resident's assessment and plan.    Enriqueta Nolasco                               Otolaryngology Consult Note  December 5, 2017      CC: nosebleed    HPI: Brie Gonzalez is a 89 year old female with PMH significant for atrial fibrillation on coumadin and aspirin. She had an episode of diverticulosis about 3 weeks ago and while being inpatient she develop almos daily recurrent right nasal bleeds that would stop with using noseclips. Her PCP recommended adding Afrin for when she has these episodes. Yesterday her PCP saw some excorations in her right nasal passage which he cauterized using silver nitrate. This morning at about 4 am she started bleeding once again but she  noticed it was more than usual. She was brought in by ambulance to the [facility] ED. ED provider applied AFfrin and pressure without much success and decided to place bilateral merocel packings. Bleeding slowed down but continued oozing from right nostril. ENT service was consulted. Brie said this was much improved and she didn't feel like she was swallowing any more blood. Past couple of SBP on bedside monitor displayed were 160-170s.[CT1.1]     Past Medical History:   Diagnosis Date     Atrial fibrillation (H)[CT1.2]    - Diverticulosis    PSH: Denied[CT1.1]    Current Outpatient Prescriptions   Medication Sig Dispense Refill     METRONIDAZOLE PO Take 500 mg by mouth every 8 hours       ATENOLOL PO Take by mouth daily       Warfarin Sodium (COUMADIN PO)        ASPIRIN PO             Allergies   Allergen Reactions     Penicillins Swelling       Social History     Social History     Marital status:      Spouse name: N/A     Number of children: N/A     Years of education: N/A     Occupational History     Not on file.     Social History Main Topics     Smoking status: Never Smoker     Smokeless tobacco: Never Used     Alcohol use Yes      Comment: Rare     Drug use: No     Sexual activity: Not on file     Other Topics Concern     Not on file     Social History Narrative[CT1.2]   Recently , 2 years ago. Lives by herself in an assisted living facility.[CT1.1]       No family history on file.[CT1.2]    ROS: 12 point review of systems is negative unless noted in HPI.    PHYSICAL EXAM:  General: laying in bed, no acute distress[CT1.1]  /74  Pulse 64  Temp 98.2  F (36.8  C) (Axillary)  Resp 14  SpO2 95%[CT1.2]   Initial SBP were in the 170s.   HEAD: normocephalic, atraumatic  Face: symmetrical  Eyes:No spontaneous nystagmus, white sclerae.   Ears: normal auricles bilaterally  Nose:   slow ooze from right nasal package and right packing was a little past outside the right nare. Afrin was sprayed bilaterally  and firm pressure was applied. Asked ED provider to have SBP within normal limits. This appeared to help completely resolve the oozing. Additional dissolvable packing consisting of thrombin, surgiflow and gelfoam was placed anterior to Merocel packings (not dissolvable) bilaterally.   Mouth: moist.   Oropharynx: uvula midline. Large clot over right side of posterior pharyngeal wall suctioned with yankaur. Once nosebleed stopped there wasn't evidence of blood dripping over the back of her throat.   Neck: supple.   Neuro: alert, oriented, answering questions appropriately.     BMP[CT1.1]  Recent Labs  Lab 12/05/17 0425      POTASSIUM 4.4   CHLORIDE 110*   THIERRY 9.0   CO2 27   BUN 22   CR 1.17*   *[CT1.3]     CBC[CT1.1]  Recent Labs  Lab 12/05/17 0425   WBC 7.2   RBC 3.91   HGB 12.6   HCT 38.1   MCV 97   MCH 32.2   MCHC 33.1   RDW 14.8   [CT1.3]     INR[CT1.1]  Recent Labs  Lab 12/05/17  0425   INR 2.32*[CT1.3]     Electrolytes[CT1.1]  Recent Labs  Lab 12/05/17  0425   THIERRY 9.0[CT1.3]       Assessment and Plan  Brie Gonzalez is a 89 year old female with a past medical history of atrial fibrillation on atenolol and coumadin, who presented today with right epistaxis. Her right nasal passage was cauterized by her primary care physician yesterday and this morning at about 4 am she started bleeding again. ED provider placed bilateral Merocel packings. Once her blood pressure was within normal limits the slow oozing from her right nasal passage stopped. Additional surgiflow with thrombin and gelfoam dissolvable packing was placed anteriorly to create an nice plug. She denied swallowing and more blood and her posterior pharyngeal wall did not show active blood dripping.     - Staph coverage while Merocels are in place. Keflex would be a good option  - [place] saline spray to bilateral packings in nose every 2-4 hours while awake  - If nosebleed occurs, spray Afrin 5-10 times into each packing and apply firm  pressure with fingers for full 15 minutes without peeking, do not use nose clips as pressure provided by them is not enough. If bleeding persists repeat once more. If it continues to persist go to the ED for management.   - Continue to monitor blood pressures. Contact PCP to address blood pressure management.   - ENT clinic referral for packing removal in 5-7 days.   - Contact ENT service with any questions or concerns.     Patient discussed with staff on call, Dr. Nolasco.     Alice Alexandre, PGY-2  Otolaryngology- Head and Neck Surgery  Pager: [phone]  Please contact ENT with questions by dialing [phone] and entering job code [phone] when prompted.[CT1.1]         Revision History        User Key Date/Time User Provider Type Action    > CT1.3 12/5/2017  8:27 AM Alice Norton MD Resident Sign     CT1.2 12/5/2017  8:08 AM Alice Norton MD Resident      CT1.1 12/5/2017  8:07 AM Alice Norton MD Resident                      Progress Notes - Physician (Notes for yesterday and today)      Progress Notes by Kenyatta Jennings MD at 12/6/2017  5:44 PM     Author:  Kenyatta Jennings MD Service:  Otolaryngology/ENT Author Type:  Resident    Filed:  12/6/2017  5:49 PM Date of Service:  12/6/2017  5:44 PM Creation Time:  12/6/2017  5:44 PM    Status:  Signed :  Kenyatta Jennings MD (Resident)         ENT note    S: Patient reports some light blood tinged secretions but no bright red blood from nose.    O:  AOx3  Merocels in place, no bleeding  OC/OP clear w/o bleeding  Unlabored breathing on RA    A/P: Brie Gonzalez is a 89 year old female with a past medical history of atrial fibrillation on atenolol and coumadin, who presented to ED with right epistaxis. Her right nasal passage was cauterized by her primary care physician but she started bleeding again. ED provider placed bilateral Merocel packings. Once her blood pressure was  within normal limits the slow oozing from her right nasal passage stopped. Additional surgiflow with thrombin and gelfoam dissolvable packing was placed anteriorly to create an nice plug.      - Staph coverage while Merocels are in place. Keflex would be a good option  - Ocean saline spray to bilateral packings in nose every 2-4 hours while awake  - If nosebleed occurs, spray Afrin 5-10 times into each packing and apply firm pressure with fingers for full 15 minutes without peeking, do not use nose clips as pressure provided by them is not enough. If bleeding persists repeat once more. If it continues to persist go to the ED for management.   - Continue to monitor blood pressures. Contact PCP to address blood pressure management.   - ENT clinic referral for packing removal in 5-7 days.   - Contact ENT service with any questions or concerns.     Kenyatta Castro MD  ENT PGY#5[FT1.1]     Revision History        User Key Date/Time User Provider Type Action    > FT1.1 12/6/2017  5:49 PM Kenyatta Jennings MD Resident Sign            Progress Notes by Yulia Bautista MD at 12/6/2017  8:49 AM     Author:  Yulia Bautista MD Service:  Emergency Medicine Author Type:  Physician    Filed:  12/6/2017  8:53 AM Date of Service:  12/6/2017  8:49 AM Creation Time:  12/6/2017  8:49 AM    Status:  Signed :  Yulia Bautista MD (Physician)         Emergency Medicine Observation Attending note     The patient was independently seen and examined by me. The chart, vital signs, and labs were reviewed. The patient's findings were discussed with the BELLA on the observation unit, and I agree with the findings of the note and the plan.     89 yo female, admitted to the observation unit after presenting to the ER with c/o epistaxis. She is on chronic coumadin. Both naris were packed in the ED with Merocel, with ongoing oozing. ENT saw pt, and also added thrombin and gelfoam. She was admitted for monitoring.  Bleeding appears controlled at this time, though she continues to touch a klenex to her nose, and gets a small amt of bloody return to tissue. Oropharynx clear.   [MT1.1]  /90 (BP Location: Right arm)  Pulse 64  Temp 98.8  F (37.1  C) (Oral)  Resp 16  SpO2 95%[MT1.2]       Exam:  General: awake, alert, NAD  HEENT: NC/AT, packing in both nares without evidence of ongoing bleeding at this time.  Neck: supple  Lungs: CTA-B  Heart: RRR, no M/R/G  Abd: soft, ND/NT  Ext: non-tender, no edema     Assessment/plan:  1. Epistaxis: stopped. On coumadin, which makes things more challenging. Will continue keflex for prophylaxis. Packing to stay in place until f/u with ENT in 5-7days. [place] spray did not get ordered yest (per ENT recs)-will order today. Will increase metoprolol slightly for better BP control. Pt very hesitant to d/c home. No indication for ongoing hospitalization. Her daughter wrote a note on her board that she feels she needs rehab. Will consult social work.              [MT1.1]          Revision History        User Key Date/Time User Provider Type Action    > MT1.2 12/6/2017  8:53 AM Yulia Bautista MD Physician Sign     MT1.1 12/6/2017  8:49 AM Yulia Bautista MD Physician             Progress Notes by Yulia Bautista MD at 12/5/2017  9:00 AM     Author:  Yulia Bautista MD Service:  Emergency Medicine Author Type:  Physician    Filed:  12/5/2017 10:53 PM Date of Service:  12/5/2017  9:00 AM Creation Time:  12/5/2017 10:48 PM    Status:  Signed :  Yulia Bautista MD (Physician)         Emergency Medicine Observation Attending note    The patient was independently seen and examined by me. The chart, vital signs, and labs were reviewed. The patient's findings were discussed with the BELLA on the observation unit, and I agree with the findings of the note and the plan.    89 yo female, admitted to the observation unit after presenting to the ER with c/o epistaxis. She  is on chronic coumadin. Both naris were packed in the ED with Merocel, with ongoing oozing. ENT saw pt, and also added thrombin. She was admitted for monitoring. Bleeding appears controlled at this time.[MT1.1]    /65 (BP Location: Right arm)  Pulse 64  Temp 98.3  F (36.8  C) (Oral)  Resp 20  SpO2 98%[MT1.2]    Exam:  General: awake, alert, NAD  HEENT: NC/AT, packing in both nares without evidence of ongoing bleeding at this time.  Neck: supple  Lungs: CTA-B  Heart: RRR, no M/R/G  Abd: soft, ND/NT  Ext: non-tender, no edema    Assessment/plan:  1. Epistaxis: stopped. On coumadin, which makes things more challenging. Will continue keflex for prophylaxis. Packing to stay in place until f/u with ENT. Will continue to monitor for re-bleed.[MT1.1]     Revision History        User Key Date/Time User Provider Type Action    > MT1.2 12/5/2017 10:53 PM Yulia Bautista MD Physician Sign     MT1.1 12/5/2017 10:48 PM Yulia Bautista MD Physician             ED Provider Notes by Yulia Bautista MD at 12/5/2017  4:04 AM     Author:  Yulia Bautista MD Service:  Emergency Medicine Author Type:  Physician    Filed:  12/5/2017  8:43 AM Date of Service:  12/5/2017  4:04 AM Creation Time:  12/5/2017  4:55 AM    Status:  Signed :  Yulia Bautista MD (Physician)           History[MT1.1]     Chief Complaint   Patient presents with     Epistaxis[MT1.2]     HPI  Brie Gonzalez is a 89 year old female who presents with nosebleed for about an hour and a half.  She's had intermittent nosebleeds for about a week, primarily on the right side.  She saw her clinic doctor yesterday.  It was not bleeding at the time, though he cauterized a spot in her nose.  She woke up with bleeding from both nares.  She denies any cough or shortness of breath, but states it feels like the blood is pulling down the back of her throat, she is spitting it out.  She is on Coumadin, INR yesterday was 2.2.  She immediately  used Afrin and placed the nose clip on the nose started bleeding this morning, though couldn't get it to stop.  No vomiting or diarrhea.  No other specific complaints.[MT1.1]    Past Medical History:   Diagnosis Date     Atrial fibrillation (H)        History reviewed. No pertinent surgical history.    No family history on file.    Social History   Substance Use Topics     Smoking status: Never Smoker     Smokeless tobacco: Never Used     Alcohol use Yes      Comment: Rare[MT1.2]           I have reviewed the Medications, Allergies, Past Medical and Surgical History, and Social History in the Epic system.    Review of Systems   Constitutional: Negative for fever.   HENT: Positive for nosebleeds.    Respiratory: Negative for shortness of breath.    Cardiovascular: Negative for chest pain.   Gastrointestinal: Negative for abdominal pain.   All other systems reviewed and are negative.      Physical Exam[MT1.1]   BP: 172/67  Pulse: 64  Temp: 98.2  F (36.8  C)  Resp: 14  SpO2: 100 %[MT1.2]      Physical Exam   Constitutional: No distress.   HENT:   Head: Atraumatic.   Mouth/Throat: No oropharyngeal exudate.   Brisk beeding from right nare   Eyes: Pupils are equal, round, and reactive to light. No scleral icterus.   Cardiovascular: Normal heart sounds and intact distal pulses.    Pulmonary/Chest: Breath sounds normal. No respiratory distress.   Abdominal: Soft. There is no tenderness.   Musculoskeletal: She exhibits no edema or tenderness.   Skin: Skin is warm. No rash noted. She is not diaphoretic.       ED Course[MT1.1]     ED Course[MT1.2]     Procedures             Critical Care time:[MT1.1]  none   I lateral nares were packed using a single Merocel in each nares.  Unfortunately, there was continued oozing, despite spraying Afrin to the packing.[MT1.3]          Labs Ordered and Resulted from Time of ED Arrival Up to the Time of Departure from the ED   BASIC METABOLIC PANEL - Abnormal; Notable for the following:         Result Value    Chloride 110 (*)     Glucose 105 (*)     Creatinine 1.17 (*)     GFR Estimate 43 (*)     GFR Estimate If Black 52 (*)     All other components within normal limits   INR - Abnormal; Notable for the following:     INR 2.32 (*)     All other components within normal limits   CBC WITH PLATELETS DIFFERENTIAL   PERIPHERAL IV CATHETER[MT1.2]            Assessments & Plan (with Medical Decision Making)[MT1.1]   The patient had fairly brisk bleeding, primarily from her right naris, draining down into her throat.  INR was checked yesterday, was 2.2.  She had been using the nasal clamps for over an hour without getting the bleeding to slow.  I did discuss with the patient, we did move forward with packing.  After spraying Afrin in the right naris, a Merocel, covered with bacitracin, was inserted into the right naris.  Unfortunately, there was still noticeable bleeding dripping from the packing.  The left naris was then packed.  I additionally applied direct pressure to the nose externally.  This did not stop the bleeding.  INR was rechecked, is 2.32.  Hemoglobin is normal at 12.6.  ENT was called.  They are working to get the bleeding stopped.  Her blood pressure was running a little on the high side, she was given 5 mg of metoprolol IV.  The patient will be signed out to the oncoming provider pending ENT intervention.  I would favor admitting her to the observation unit for monitoring, presuming ENT is able to get this stopped, and doesn't need to take her to the operating room.  The patient was signed out to incoming provider at this time.[MT1.3]    Addendum: The nose is now packed, bleeding is stopped.  Please see the ENT note for full details.  She'll be admitted to observation.  They recommend Keflex for prophylaxis.  She does report a penicillin allergy, states that her arm swelled up and she took it in the past.  I did look back, and did see in Care Everywhere that she has taken Keflex in the past,  there was no allergy noted.  The patient vaguely remembers taking it, does not think she had a reaction.  We will go ahead and give her Keflex.    Dictation Disclaimer: Some of this Note has been completed with voice-recognition dictation software. Although errors are generally corrected real-time, there is the potential for a rare error to be present in the completed chart.[MT1.4]      I have reviewed the nursing notes.    I have reviewed the findings, diagnosis, plan and need for follow up with the patient.[MT1.1]    New Prescriptions    No medications on file       Final diagnoses:   Epistaxis[MT1.2]       12/5/2017   [facility], [place], EMERGENCY DEPARTMENT[MT1.1]     Yulia Bautista MD  12/05/17 0843  [MT1.2]     Revision History        User Key Date/Time User Provider Type Action    > MT1.2 12/5/2017  8:43 AM Yulia Bautista MD Physician Sign     MT1.4 12/5/2017  8:42 AM Yulia Bautista MD Physician Share     MT1.3 12/5/2017  7:27 AM Yulia Bautista MD Physician Share     MT1.1 12/5/2017  5:06 AM Yulia Bautista MD Physician Share                  Procedure Notes     No notes of this type exist for this encounter.      Progress Notes - Therapies (Notes from 12/03/17 through 12/06/17)     No notes of this type exist for this encounter.                                          INTERAGENCY TRANSFER FORM - LAB / IMAGING / EKG / EMG RESULTS   12/5/2017                       UNIT 6D OBSERVATION [facility]: [phone]            Unresulted Labs     None         Lab Results - 3 Days      Basic metabolic panel [[id number]] (Abnormal)  Resulted: 12/06/17 0831, Result status: Final result    Ordering provider: Fawn Barriga NP  12/06/17 0756 Resulting lab: [facility]    Specimen Information    Type Source Collected On     12/06/17 1578          Components       Value Reference Range Flag Lab   Sodium 142 133 - 144 mmol/L  51   Potassium 4.0 3.4 - 5.3  mmol/L  51   Chloride 109 94 - 109 mmol/L  51   Carbon Dioxide 25 20 - 32 mmol/L  51   Anion Gap 9 3 - 14 mmol/L  51   Glucose 92 70 - 99 mg/dL  51   Urea Nitrogen 19 7 - 30 mg/dL  51   Creatinine 0.95 0.52 - 1.04 mg/dL  51   GFR Estimate 55 >60 mL/min/1.7m2 L 51   Comment:  Non  GFR Calc   GFR Estimate If Black 66 >60 mL/min/1.7m2  51   Comment:  African American GFR Calc   Calcium 8.9 8.5 - 10.1 mg/dL  51            INR [[id number]] (Abnormal)  Resulted: 12/06/17 0820, Result status: Final result    Ordering provider: Fawn Barriga NP  12/06/17 0757 Resulting lab: [facility]    Specimen Information    Type Source Collected On     12/06/17 0757          Components       Value Reference Range Flag Lab   INR 2.57 0.86 - 1.14 H 51            Hemoglobin [[id number]]  Resulted: 12/06/17 0813, Result status: Final result    Ordering provider: Fawn Barriga NP  12/06/17 0757 Resulting lab: [facility]    Specimen Information    Type Source Collected On     12/06/17 0757          Components       Value Reference Range Flag Lab   Hemoglobin 12.1 11.7 - 15.7 g/dL  51            INR [[id number]] (Abnormal)  Resulted: 12/05/17 0549, Result status: Final result    Ordering provider: Yulia Bautista MD  12/05/17 0425 Resulting lab: [facility]    Specimen Information    Type Source Collected On     12/05/17 0425          Components       Value Reference Range Flag Lab   INR 2.32 0.86 - 1.14 H 51            Basic metabolic panel [[id number]] (Abnormal)  Resulted: 12/05/17 0537, Result status: Final result    Ordering provider: Yulia Bautista MD  12/05/17 0425 Resulting lab: [facility]    Specimen Information    Type Source Collected On     12/05/17 0425          Components       Value Reference Range Flag Lab   Sodium 143 133 - 144 mmol/L  51   Potassium 4.4 3.4  - 5.3 mmol/L  51   Chloride 110 94 - 109 mmol/L H 51   Carbon Dioxide 27 20 - 32 mmol/L  51   Anion Gap 6 3 - 14 mmol/L  51   Glucose 105 70 - 99 mg/dL H 51   Urea Nitrogen 22 7 - 30 mg/dL  51   Creatinine 1.17 0.52 - 1.04 mg/dL H 51   GFR Estimate 43 >60 mL/min/1.7m2 L 51   Comment:  Non  GFR Calc   GFR Estimate If Black 52 >60 mL/min/1.7m2 L 51   Comment:  African American GFR Calc   Calcium 9.0 8.5 - 10.1 mg/dL  51            CBC with platelets differential [[id number]]  Resulted: 12/05/17 0530, Result status: Final result    Ordering provider: Yulia Bautista MD  12/05/17 0425 Resulting lab: [facility]    Specimen Information    Type Source Collected On     12/05/17 0425          Components       Value Reference Range Flag Lab   WBC 7.2 4.0 - 11.0 10e9/L  51   RBC Count 3.91 3.8 - 5.2 10e12/L  51   Hemoglobin 12.6 11.7 - 15.7 g/dL  51   Hematocrit 38.1 35.0 - 47.0 %  51   MCV 97 78 - 100 fl  51   MCH 32.2 26.5 - 33.0 pg  51   MCHC 33.1 31.5 - 36.5 g/dL  51   RDW 14.8 10.0 - 15.0 %  51   Platelet Count 228 150 - 450 10e9/L  51   Diff Method Automated Method   51   % Neutrophils 58.4 %  51   % Lymphocytes 20.1 %  51   % Monocytes 16.6 %  51   % Eosinophils 3.7 %  51   % Basophils 0.6 %  51   % Immature Granulocytes 0.6 %  51   Nucleated RBCs 0 0 /100  51   Absolute Neutrophil 4.2 1.6 - 8.3 10e9/L  51   Absolute Lymphocytes 1.5 0.8 - 5.3 10e9/L  51   Absolute Monocytes 1.2 0.0 - 1.3 10e9/L  51   Absolute Eosinophils 0.3 0.0 - 0.7 10e9/L  51   Absolute Basophils 0.0 0.0 - 0.2 10e9/L  51   Abs Immature Granulocytes 0.0 0 - 0.4 10e9/L  51   Absolute Nucleated RBC 0.0   51            Testing Performed By     Lab - Abbreviation Name Director Address Valid Date Range    51 - Unknown [facility] Unknown [address] [facility] [address] 12/31/14 1010 - Present            Encounter-Level Documents:     There are no encounter-level  documents.      Order-Level Documents:     There are no order-level documents.

## 2017-12-05 NOTE — PLAN OF CARE
Problem: Patient Care Overview  Goal: Plan of Care/Patient Progress Review  Outpatient/Observation goals to be met before discharge home:    -1.)  Diagnostic tests and consults completed and resulted - NO  -2.)  Vital signs normal or at patient baseline - YES  -3.)  Adequate pain control on oral analgesics - YES  -4.)  No rebleeding for 12 hours - NO

## 2017-12-05 NOTE — IP AVS SNAPSHOT
MRN:[id number]                      After Visit Summary   12/5/2017    Brie Gonzalez    MRN: [id number]           Thank you!     Thank you for choosing [facility] for your care. Our goal is always to provide you with excellent care. Hearing back from our patients is one way we can continue to improve our services. Please take a few minutes to complete the written survey that you may receive in the mail after you visit with us. Thank you!        Patient Information     Date Of Birth          7/8/1925        Designated Caregiver       Most Recent Value    Caregiver    Will someone help with your care after discharge? yes    Name of designated caregiver Tracey    Phone number of caregiver none given    Caregiver address none given      About your hospital stay     You were admitted on:  December 5, 2017 You last received care in the:  [facility] Observation [facility]    You were discharged on:  December 6, 2017        Reason for your hospital stay       Epistaxis                  Who to Call     For medical emergencies, please call 911.  For non-urgent questions about your medical care, please call your primary care provider or clinic, [phone]          Attending Provider     Provider Specialty    Yulia Bautista MD Emergency Medicine       Primary Care Provider Office Phone # Fax #    Philip Pederson [phone]      After Care Instructions     Activity - Up ad wilman           Advance Diet as Tolerated       Follow this diet upon discharge: regular adult diet            Encourage PO fluids           General info for SNF       Length of Stay Estimate: Short Term Care: Estimated # of Days <30  Condition at Discharge: Stable  Level of care:skilled   Rehabilitation Potential: Good  Admission H&P remains valid and up-to-date: Yes  Recent Chemotherapy: N/A  Use Nursing Home Standing Orders: Yes            Mantoux instructions       Give two-step Mantoux (PPD) Per Facility Policy Yes                   Follow-up Appointments     Follow Up and recommended labs and tests       Follow up with primary care provider within 1 week of discharge for hospitalization follow up.    Follow up in ENT clinic with Dr. Sargent on Monday, 12/11 at 12:20PM. Please call the clinic with questions/concerns: [phone].    Otolaryngology/ENT Clinic:  [facility]  [address] [place] [id number]    Follow up sooner if re-bleeding around nasal packing.    Take Keflex 500mg twice daily with food x 10 days.  Leave nasal packing in until follow up with ENT.  Delta saline nasal spray each nostril q2h while awake.  Afrin nasal spray 5 sprays into affected nostril as needed for nasal bleeding. If this occurs, see primary care provider.    Increase metoprolol to 50mg once daily for elevated blood pressure, which may be contributing to the nosebleed. Follow up closely with PCP for blood pressure and HR recheck and ongoing blood pressure management.    You may use Ativan 1mg as needed for anxiety if this occurs while at the TCU. I have prescribed a small supply of this. Continue taking your Paxil as prescribed by your PCP. Follow up with primary care provider for ongoing issues with anxiety.     PCP should also recheck your INR within 1 week. Resume normal home dosing of warfarin.  Return to ED immediately if severe re-bleeding, lightheadedness, or any other new concerning symptoms.                  Your next 10 appointments already scheduled     Dec 11, 2017 12:20 PM CST   (Arrive by 12:05 PM)   NEW THROAT with Jie Sargent MD   [facility] ([facility])    [address] [place] [id number] [phone]           This is a multi-disciplinary care team visit as patients with your type of problem are usually seen by a team of an MD and a Speech-Language Pathologist (who is a specialist in  disorders of the voice, throat, and breathing).  Please plan about 2 hours for your visit, which will likely include Laryngeal Function Studies, a Voice/Swallow/Breathing Evaluation, and an Endoscopic Laryngeal Examination to provide a comprehensive evaluation.  Please check with your insurance company to ensure you are covered for these services. - It is important to know that if you are evaluated and/or treated by both a physician and a speech pathologist during your visit, your billing will reflect the input that you receive from both providers as separate professionals. Although most insurance plans do cover these services, we encourage you to contact your insurance company prior to your visit to determine whether there are any coverage limitations that might affect you financially. - Billing/procedure codes that are frequently associated with visits to our clinic include (but are not limited to) the ones listed below. Most patients will not need all of these items, but it may be useful to ask your insurance company's patient . 08225: Flexible fiberoptic laryngoscopy, 42166: Laryngoscopy; flexible or rigid fiberoptic, with stroboscopy, 96836: Flexible endoscopic evaluation of swallowing, 95055: Laryngeal function aerodynamic evaluation, 46896: Evaluation of Voice and Resonance, 63169: Speech pathology treatment for voice, speech, communication, 58783: Speech pathology treatment for swallowing/oral function for feeding - If you have any concerns or questions, or if you would prefer not to have a speech pathologist involved in your visit, please contact our Clinic Coordinator at [phone], at least 24 hours prior to your appointment.              Additional Services     Physical Therapy Adult Consult       Evaluate and treat as clinically indicated.    Reason:  weakness                  Pending Results     No orders found for last 3 day(s).            Statement of Approval     Ordered           12/06/17 1612  I have reviewed and agree with all the recommendations and orders detailed in this document.  EFFECTIVE NOW     Approved and electronically signed by:  Merry Akins PA-C             Admission Information     Date & Time Provider Department Dept. Phone    12/5/2017 Yulia Bautista MD Unit [facility]D [facility]ation [facility] [place] [phone]      Your Vitals Were     Blood Pressure Pulse Temperature Respirations Pulse Oximetry       119/59 (BP Location: Right arm) 67 98.1  F (36.7  C) (Oral) 16 98%       Care EveryWhere ID     This is your Care EveryWhere ID. This could be used by other organizations to access your [facility] medical records  [id number]        Equal Access to Services     MOHIT REMY : Morgan Rivera, erasto benites, gulshan levimakarissa mondragon, ana dockery. So [facility] [place] [phone].    ATENCIÓN: Si habla español, tiene a posada disposición servicios gratuitos de asistencia lingüística. Llame al [phone].    We comply with applicable federal civil rights laws and [place] laws. We do not discriminate on the basis of race, color, national origin, age, disability, sex, sexual orientation, or gender identity.               Review of your medicines      START taking        Dose / Directions    cephALEXin 500 MG capsule   Commonly known as:  KEFLEX   Indication:  nasal packing prophylaxis        Dose:  500 mg   Take 1 capsule (500 mg) by mouth 2 times daily for 10 days   Quantity:  20 capsule   Refills:  0       LORazepam 1 MG tablet   Commonly known as:  ATIVAN   Used for:  Anxiety        Dose:  1 mg   Take 1 tablet (1 mg) by mouth every 8 hours as needed for anxiety   Quantity:  5 tablet   Refills:  0       sodium chloride 0.65 % nasal spray   Commonly known as:  OCEAN        Dose:  1 spray   Spray 1 spray into both nostrils every 2 hours   Quantity:  1 Bottle   Refills:  0         CONTINUE these medicines which may have CHANGED, or  have new prescriptions. If we are uncertain of the size of tablets/capsules you have at home, strength may be listed as something that might have changed.        Dose / Directions    metoprolol 25 MG tablet   Commonly known as:  LOPRESSOR   This may have changed:  how much to take   Used for:  Chronic atrial fibrillation (H)        Dose:  50 mg   Take 2 tablets (50 mg) by mouth daily   Quantity:  10 tablet   Refills:  0       * oxymetazoline 0.05 % spray   Commonly known as:  AFRIN   This may have changed:  Another medication with the same name was added. Make sure you understand how and when to take each.        Dose:  2 spray   Spray 2 sprays into both nostrils 2 times daily as needed for congestion   Refills:  0       * oxymetazoline 0.05 % spray   Commonly known as:  AFRIN   This may have changed:  You were already taking a medication with the same name, and this prescription was added. Make sure you understand how and when to take each.        Dose:  2 spray   Spray 2 sprays into both nostrils daily as needed (nasal bleeding --please page MD/NP)   Refills:  0       * Notice:  This list has 2 medication(s) that are the same as other medications prescribed for you. Read the directions carefully, and ask your doctor or other care provider to review them with you.      CONTINUE these medicines which have NOT CHANGED        Dose / Directions    ACETAMINOPHEN PO        Dose:  325 mg   Take 325 mg by mouth every 4 hours as needed for pain   Refills:  0       ASPIRIN PO        Dose:  81 mg   Take 81 mg by mouth every other day   Refills:  0       calcium-vitamin D 600-400 MG-UNIT per tablet   Commonly known as:  CALTRATE        Dose:  1 tablet   Take 1 tablet by mouth daily   Refills:  0       COUMADIN PO        Dose:  2.5 mg   Take 2.5 mg by mouth daily   Refills:  0       PAROXETINE HCL PO        Dose:  10 mg   Take 10 mg by mouth daily   Refills:  0       SIMVASTATIN PO        Dose:  20 mg   Take 20 mg by mouth At  Bedtime   Refills:  0       VITAMIN D (CHOLECALCIFEROL) PO        Dose:  1000 Units   Take 1,000 Units by mouth daily   Refills:  0            Where to get your medicines      Some of these will need a paper prescription and others can be bought over the counter. Ask your nurse if you have questions.     Bring a paper prescription for each of these medications     cephALEXin 500 MG capsule    LORazepam 1 MG tablet    metoprolol 25 MG tablet    sodium chloride 0.65 % nasal spray               ANTIBIOTIC INSTRUCTION     You've Been Prescribed an Antibiotic - Now What?  Your healthcare team thinks that you or your loved one might have an infection. Some infections can be treated with antibiotics, which are powerful, life-saving drugs. Like all medications, antibiotics have side effects and should only be used when necessary. There are some important things you should know about your antibiotic treatment.      Your healthcare team may run tests before you start taking an antibiotic.    Your team may take samples (e.g., from your blood, urine or other areas) to run tests to look for bacteria. These test can be important to determine if you need an antibiotic at all and, if you do, which antibiotic will work best.      Within a few days, your healthcare team might change or even stop your antibiotic.    Your team may start you on an antibiotic while they are working to find out what is making you sick.    Your team might change your antibiotic because test results show that a different antibiotic would be better to treat your infection.    In some cases, once your team has more information, they learn that you do not need an antibiotic at all. They may find out that you don't have an infection, or that the antibiotic you're taking won't work against your infection. For example, an infection caused by a virus can't be treated with antibiotics. Staying on an antibiotic when you don't need it is more likely to be harmful  than helpful.      You may experience side effects from your antibiotic.    Like all medications, antibiotics have side effects. Some of these can be serious.    Let you healthcare team know if you have any known allergies when you are admitted to the hospital.    One significant side effect of nearly all antibiotics is the risk of severe and sometimes deadly diarrhea caused by Clostridium difficile (C. Difficile). This occurs when a person takes antibiotics because some good germs are destroyed. Antibiotic use allows C. diificile to take over, putting patients at high risk for this serious infection.    As a patient or caregiver, it is important to understand your or your loved one's antibiotic treatment. It is especially important for caregivers to speak up when patients can't speak for themselves. Here are some important questions to ask your healthcare team.    What infection is this antibiotic treating and how do you know I have that infection?    What side effects might occur from this antibiotic?    How long will I need to take this antibiotic?    Is it safe to take this antibiotic with other medications or supplements (e.g., vitamins) that I am taking?     Are there any special directions I need to know about taking this antibiotic? For example, should I take it with food?    How will I be monitored to know whether my infection is responding to the antibiotic?    What tests may help to make sure the right antibiotic is prescribed for me?      Information provided by:  www.cdc.gov/getsmart  U.S. Department of Health and Human Services  Centers for disease Control and Prevention  National Center for Emerging and Zoonotic Infectious Diseases  Division of Healthcare Quality Promotion         Protect others around you: Learn how to safely use, store and throw away your medicines at www.disposemymeds.org.             Medication List: This is a list of all your medications and when to take them. Check marks below  indicate your daily home schedule. Keep this list as a reference.      Medications           Morning Afternoon Evening Bedtime As Needed    ACETAMINOPHEN PO   Take 325 mg by mouth every 4 hours as needed for pain   Last time this was given:  650 mg on 12/5/2017  4:23 PM                                ASPIRIN PO   Take 81 mg by mouth every other day   Last time this was given:  81 mg on 12/6/2017  9:27 AM                                calcium-vitamin D 600-400 MG-UNIT per tablet   Commonly known as:  CALTRATE   Take 1 tablet by mouth daily                                cephALEXin 500 MG capsule   Commonly known as:  KEFLEX   Take 1 capsule (500 mg) by mouth 2 times daily for 10 days   Last time this was given:  500 mg on 12/6/2017  9:28 AM                                COUMADIN PO   Take 2.5 mg by mouth daily   Last time this was given:  2.5 mg on 12/6/2017  5:40 PM                                LORazepam 1 MG tablet   Commonly known as:  ATIVAN   Take 1 tablet (1 mg) by mouth every 8 hours as needed for anxiety   Last time this was given:  0.5 mg on 12/6/2017 12:35 AM                                metoprolol 25 MG tablet   Commonly known as:  LOPRESSOR   Take 2 tablets (50 mg) by mouth daily   Last time this was given:  50 mg on 12/6/2017  9:27 AM                                * oxymetazoline 0.05 % spray   Commonly known as:  AFRIN   Spray 2 sprays into both nostrils 2 times daily as needed for congestion                                * oxymetazoline 0.05 % spray   Commonly known as:  AFRIN   Spray 2 sprays into both nostrils daily as needed (nasal bleeding --please page MD/NP)                                PAROXETINE HCL PO   Take 10 mg by mouth daily   Last time this was given:  10 mg on 12/5/2017  1:26 PM                                SIMVASTATIN PO   Take 20 mg by mouth At Bedtime   Last time this was given:  20 mg on 12/5/2017  8:17 PM                                sodium chloride 0.65 % nasal spray    Commonly known as:  OCEAN   Spray 1 spray into both nostrils every 2 hours   Last time this was given:  2 sprays on 12/6/2017  5:40 PM                                VITAMIN D (CHOLECALCIFEROL) PO   Take 1,000 Units by mouth daily                                * Notice:  This list has 2 medication(s) that are the same as other medications prescribed for you. Read the directions carefully, and ask your doctor or other care provider to review them with you.

## 2017-12-06 VITALS
TEMPERATURE: 98.1 F | SYSTOLIC BLOOD PRESSURE: 119 MMHG | OXYGEN SATURATION: 98 % | RESPIRATION RATE: 16 BRPM | HEART RATE: 67 BPM | DIASTOLIC BLOOD PRESSURE: 59 MMHG

## 2017-12-06 LAB
ANION GAP SERPL CALCULATED.3IONS-SCNC: 9 MMOL/L (ref 3–14)
BUN SERPL-MCNC: 19 MG/DL (ref 7–30)
CALCIUM SERPL-MCNC: 8.9 MG/DL (ref 8.5–10.1)
CHLORIDE SERPL-SCNC: 109 MMOL/L (ref 94–109)
CO2 SERPL-SCNC: 25 MMOL/L (ref 20–32)
CREAT SERPL-MCNC: 0.95 MG/DL (ref 0.52–1.04)
GFR SERPL CREATININE-BSD FRML MDRD: 55 ML/MIN/1.7M2
GLUCOSE SERPL-MCNC: 92 MG/DL (ref 70–99)
HGB BLD-MCNC: 12.1 G/DL (ref 11.7–15.7)
INR PPP: 2.57 (ref 0.86–1.14)
POTASSIUM SERPL-SCNC: 4 MMOL/L (ref 3.4–5.3)
SODIUM SERPL-SCNC: 142 MMOL/L (ref 133–144)

## 2017-12-06 PROCEDURE — 36415 COLL VENOUS BLD VENIPUNCTURE: CPT | Performed by: NURSE PRACTITIONER

## 2017-12-06 PROCEDURE — 25000132 ZZH RX MED GY IP 250 OP 250 PS 637: Performed by: PHYSICIAN ASSISTANT

## 2017-12-06 PROCEDURE — 80048 BASIC METABOLIC PNL TOTAL CA: CPT | Performed by: NURSE PRACTITIONER

## 2017-12-06 PROCEDURE — 85018 HEMOGLOBIN: CPT | Performed by: NURSE PRACTITIONER

## 2017-12-06 PROCEDURE — 25000132 ZZH RX MED GY IP 250 OP 250 PS 637: Performed by: NURSE PRACTITIONER

## 2017-12-06 PROCEDURE — G0378 HOSPITAL OBSERVATION PER HR: HCPCS

## 2017-12-06 PROCEDURE — 25000132 ZZH RX MED GY IP 250 OP 250 PS 637: Performed by: EMERGENCY MEDICINE

## 2017-12-06 PROCEDURE — 99217 ZZC OBSERVATION CARE DISCHARGE: CPT | Mod: Z6 | Performed by: PHYSICIAN ASSISTANT

## 2017-12-06 PROCEDURE — 85610 PROTHROMBIN TIME: CPT | Performed by: NURSE PRACTITIONER

## 2017-12-06 PROCEDURE — 40000893 ZZH STATISTIC PT IP EVAL DEFER

## 2017-12-06 RX ORDER — LORAZEPAM 1 MG/1
1 TABLET ORAL EVERY 8 HOURS PRN
Qty: 5 TABLET | Refills: 0 | Status: SHIPPED | OUTPATIENT
Start: 2017-12-06 | End: 2017-12-30

## 2017-12-06 RX ORDER — OXYMETAZOLINE HYDROCHLORIDE 0.05 G/100ML
2 SPRAY NASAL DAILY PRN
Refills: 0 | COMMUNITY
Start: 2017-12-06 | End: 2017-12-30

## 2017-12-06 RX ORDER — METOPROLOL TARTRATE 25 MG/1
50 TABLET, FILM COATED ORAL DAILY
Qty: 10 TABLET | Refills: 0 | Status: ON HOLD | OUTPATIENT
Start: 2017-12-06 | End: 2018-05-15

## 2017-12-06 RX ORDER — SIMVASTATIN 20 MG
20 TABLET ORAL AT BEDTIME
Status: DISCONTINUED | OUTPATIENT
Start: 2017-12-06 | End: 2017-12-06

## 2017-12-06 RX ORDER — ECHINACEA PURPUREA EXTRACT 125 MG
1 TABLET ORAL
Qty: 1 BOTTLE | Refills: 0 | Status: ON HOLD | OUTPATIENT
Start: 2017-12-06 | End: 2019-09-05

## 2017-12-06 RX ORDER — WARFARIN SODIUM 2.5 MG/1
2.5 TABLET ORAL
Status: COMPLETED | OUTPATIENT
Start: 2017-12-06 | End: 2017-12-06

## 2017-12-06 RX ORDER — CEPHALEXIN 500 MG/1
500 CAPSULE ORAL 2 TIMES DAILY
Qty: 20 CAPSULE | Refills: 0 | Status: SHIPPED | OUTPATIENT
Start: 2017-12-06 | End: 2017-12-16

## 2017-12-06 RX ORDER — METOPROLOL TARTRATE 50 MG
50 TABLET ORAL DAILY
Status: DISCONTINUED | OUTPATIENT
Start: 2017-12-06 | End: 2017-12-06 | Stop reason: HOSPADM

## 2017-12-06 RX ADMIN — SALINE NASAL SPRAY 2 SPRAY: 1.5 SOLUTION NASAL at 16:15

## 2017-12-06 RX ADMIN — LORAZEPAM 0.5 MG: 0.5 TABLET ORAL at 00:35

## 2017-12-06 RX ADMIN — CEPHALEXIN 500 MG: 500 CAPSULE ORAL at 09:28

## 2017-12-06 RX ADMIN — SALINE NASAL SPRAY 2 SPRAY: 1.5 SOLUTION NASAL at 14:12

## 2017-12-06 RX ADMIN — WARFARIN SODIUM 2.5 MG: 2.5 TABLET ORAL at 17:40

## 2017-12-06 RX ADMIN — METOPROLOL TARTRATE 50 MG: 50 TABLET ORAL at 09:27

## 2017-12-06 RX ADMIN — ASPIRIN 81 MG CHEWABLE TABLET 81 MG: 81 TABLET CHEWABLE at 09:27

## 2017-12-06 RX ADMIN — SALINE NASAL SPRAY 2 SPRAY: 1.5 SOLUTION NASAL at 09:28

## 2017-12-06 RX ADMIN — SALINE NASAL SPRAY 2 SPRAY: 1.5 SOLUTION NASAL at 17:40

## 2017-12-06 NOTE — DISCHARGE SUMMARY
Discharge Summary    Brie Gonzalez MRN# [id number]   YOB: 1925 Age: 89 year old     Date of Admission:  12/5/2017  Date of Discharge:  12/6/2017  Admitting Physician:  Yulia Bautista MD  Discharge Physician:  Yulia Bautista MD  Discharging Service:  ED observation     Primary Provider: Philip Peedrson          Discharge Diagnosis:     Epistaxis                Discharge Disposition:   Discharged to short-term care facility           Condition on Discharge:   Discharge condition: Stable   Code status on discharge: Full Code           Procedures:   Nasal packing in ED, performed by ENT          Discharge Medications:     Current Discharge Medication List      START taking these medications    Details   cephALEXin (KEFLEX) 500 MG capsule Take 1 capsule (500 mg) by mouth 2 times daily for 10 days  Qty: 20 capsule, Refills: 0    Associated Diagnoses: Epistaxis      !! oxymetazoline (AFRIN) 0.05 % spray Spray 2 sprays into both nostrils daily as needed (nasal bleeding --please page MD/NP)  Refills: 0    Associated Diagnoses: Epistaxis      sodium chloride (OCEAN) 0.65 % nasal spray Spray 1 spray into both nostrils every 2 hours  Qty: 1 Bottle, Refills: 0    Associated Diagnoses: Epistaxis      LORazepam (ATIVAN) 1 MG tablet Take 1 tablet (1 mg) by mouth every 8 hours as needed for anxiety  Qty: 5 tablet, Refills: 0    Associated Diagnoses: Anxiety       !! - Potential duplicate medications found. Please discuss with provider.      CONTINUE these medications which have CHANGED    Details   metoprolol (LOPRESSOR) 25 MG tablet Take 2 tablets (50 mg) by mouth daily  Qty: 10 tablet, Refills: 0    Associated Diagnoses: Chronic atrial fibrillation (H)         CONTINUE these medications which have NOT CHANGED    Details   ACETAMINOPHEN PO Take 325 mg by mouth every 4 hours as needed for pain      calcium-vitamin D (CALTRATE) 600-400 MG-UNIT per tablet Take 1 tablet by mouth daily      VITAMIN D,  CHOLECALCIFEROL, PO Take 1,000 Units by mouth daily      !! oxymetazoline (AFRIN) 0.05 % spray Spray 2 sprays into both nostrils 2 times daily as needed for congestion      SIMVASTATIN PO Take 20 mg by mouth At Bedtime      Warfarin Sodium (COUMADIN PO) Take 2.5 mg by mouth daily       ASPIRIN PO Take 81 mg by mouth every other day       PAROXETINE HCL PO Take 10 mg by mouth daily       !! - Potential duplicate medications found. Please discuss with provider.                Consultations:   ENT  Social work             Brief History of Illness:   Brie Gonzalez is a 89 year old female a past medical history significant for atrial fibrillation on coumadin, who presented to ER with right epistaxis.           Hospital Course:   1. Epistaxis. ENT consulted in ED; nasal packing placed. There was no further significant re-bleeding during a 24+ hour observation period. Serial hemoglobins stable at 12.8, 12.1. INR 2.57. ENT recommended Keflex course for Staph prevention while nasal packing in place. Recommended [place] saline spray to bilateral packings in nose every 2-4 hours while awake. Afrin spray if rebleeding, which was not necessary.   Recommended continued monitoring of her hypertension as she did have elevated blood pressures throughout her stay- up to a maximum of   We did increase her metoprolol to 50mg once daily as her HR was normal and blood pressure remained elevated. She should follow up with PCP for further blood pressure management.  She will follow up in ENT clinic-  referral placed - for packing removal in 5-7 days.      2. Anxiety/Depresson. Continued PTA Paxil 10 mg. Did have an anxious episode last night for which she received Ativan 1mg and had good results. Requested prescription for Ativan at time of discharge. I provided a small script and discussed use/side effects with patient and family member.     3. Atrial fibrillation, chronic warfarin use. Continued PTA warfarin and ASA 81 mg every other day.  INR stable 2.5, 2.57.  Increased Metoprolol to 50mg BID as above.    4. Disposition. There was concern for discharge planning amongst patient and her daughter. She was seen by PT and was deemed medically safe for discharge to home. However, patient and family continued to request TCU placement. Social work was consulted and arranged for TCU with private pay.            Final Day of Progress before Discharge:       Physical Exam:  Blood pressure 119/59, pulse 67, temperature 98.1  F (36.7  C), temperature source Oral, resp. rate 16, SpO2 98 %, not currently breastfeeding.    General: alert, appears comfortable seated in bed, NAD. Afebrile.  Skin: no pallor.  Head: Normocephalic.  EENT: Nose: bilateral nares packed. No oozing around packing.  Oropharynx: MMM. No bleeding in posterior pharynx.   Neck: Neck supple.  Respiratory: No distress. Equal inspiration to bilateral bases. Lungs CTAB.  Cardiovascular:  RRR. No murmurs, clicks gallops or rub. DP pulses 2+ bilaterally.   Gastrointestinal: Abdomen soft,  Musculoskeletal: extremities normal- no gross deformities noted, gait normal and normal muscle tone   Neurologic: Follows commands.   Psychiatric: mentation appears normal. Anxious mood.             Data:  All laboratory data reviewed             Significant Results:     Results for orders placed or performed during the hospital encounter of 12/05/17   Basic metabolic panel   Result Value Ref Range    Sodium 143 133 - 144 mmol/L    Potassium 4.4 3.4 - 5.3 mmol/L    Chloride 110 (H) 94 - 109 mmol/L    Carbon Dioxide 27 20 - 32 mmol/L    Anion Gap 6 3 - 14 mmol/L    Glucose 105 (H) 70 - 99 mg/dL    Urea Nitrogen 22 7 - 30 mg/dL    Creatinine 1.17 (H) 0.52 - 1.04 mg/dL    GFR Estimate 43 (L) >60 mL/min/1.7m2    GFR Estimate If Black 52 (L) >60 mL/min/1.7m2    Calcium 9.0 8.5 - 10.1 mg/dL   CBC with platelets differential   Result Value Ref Range    WBC 7.2 4.0 - 11.0 10e9/L    RBC Count 3.91 3.8 - 5.2 10e12/L     Hemoglobin 12.6 11.7 - 15.7 g/dL    Hematocrit 38.1 35.0 - 47.0 %    MCV 97 78 - 100 fl    MCH 32.2 26.5 - 33.0 pg    MCHC 33.1 31.5 - 36.5 g/dL    RDW 14.8 10.0 - 15.0 %    Platelet Count 228 150 - 450 10e9/L    Diff Method Automated Method     % Neutrophils 58.4 %    % Lymphocytes 20.1 %    % Monocytes 16.6 %    % Eosinophils 3.7 %    % Basophils 0.6 %    % Immature Granulocytes 0.6 %    Nucleated RBCs 0 0 /100    Absolute Neutrophil 4.2 1.6 - 8.3 10e9/L    Absolute Lymphocytes 1.5 0.8 - 5.3 10e9/L    Absolute Monocytes 1.2 0.0 - 1.3 10e9/L    Absolute Eosinophils 0.3 0.0 - 0.7 10e9/L    Absolute Basophils 0.0 0.0 - 0.2 10e9/L    Abs Immature Granulocytes 0.0 0 - 0.4 10e9/L    Absolute Nucleated RBC 0.0    INR   Result Value Ref Range    INR 2.32 (H) 0.86 - 1.14   Basic metabolic panel   Result Value Ref Range    Sodium 142 133 - 144 mmol/L    Potassium 4.0 3.4 - 5.3 mmol/L    Chloride 109 94 - 109 mmol/L    Carbon Dioxide 25 20 - 32 mmol/L    Anion Gap 9 3 - 14 mmol/L    Glucose 92 70 - 99 mg/dL    Urea Nitrogen 19 7 - 30 mg/dL    Creatinine 0.95 0.52 - 1.04 mg/dL    GFR Estimate 55 (L) >60 mL/min/1.7m2    GFR Estimate If Black 66 >60 mL/min/1.7m2    Calcium 8.9 8.5 - 10.1 mg/dL   Hemoglobin   Result Value Ref Range    Hemoglobin 12.1 11.7 - 15.7 g/dL   INR   Result Value Ref Range    INR 2.57 (H) 0.86 - 1.14      No results found for this or any previous visit (from the past 48 hour(s)).             Pending Results:   Unresulted Labs Ordered in the Past 30 Days of this Admission     No orders found for last 61 day(s).                  Discharge Instructions and Follow-Up:     Discharge Procedure Orders  General info for SNF   Order Comments: Length of Stay Estimate: Short Term Care: Estimated # of Days <30  Condition at Discharge: Stable  Level of care:skilled   Rehabilitation Potential: Good  Admission H&P remains valid and up-to-date: Yes  Recent Chemotherapy: N/A  Use Nursing Home Standing Orders: Yes      Mantoux instructions   Order Comments: Give two-step Mantoux (PPD) Per Facility Policy Yes     Activity - Up ad wilman   Order Specific Question Answer Comments   Is discharge order? Yes      Reason for your hospital stay   Order Comments: Epistaxis     Encourage PO fluids     Follow Up and recommended labs and tests   Order Comments: Follow up with primary care provider within 1 week of discharge for hospitalization follow up.    Follow up in ENT clinic with Dr. Sargent on Monday, 12/11 at 12:20PM. Please call the clinic with questions/concerns: [phone].    Otolaryngology/ENT Clinic:  [facility]  [address] [place], [place] [id number]    Follow up sooner if re-bleeding around nasal packing.    Take Keflex 500mg twice daily with food x 10 days.  Leave nasal packing in until follow up with ENT.  [place] saline nasal spray each nostril q2h while awake.  Afrin nasal spray 5 sprays into affected nostril as needed for nasal bleeding. If this occurs, see primary care provider.    Increase metoprolol to 50mg once daily for elevated blood pressure, which may be contributing to the nosebleed. Follow up closely with PCP for blood pressure and HR recheck and ongoing blood pressure management.    You may use Ativan 1mg as needed for anxiety if this occurs while at the TCU. I have prescribed a small supply of this. Continue taking your Paxil as prescribed by your PCP. Follow up with primary care provider for ongoing issues with anxiety.     PCP should also recheck your INR within 1 week. Resume normal home dosing of warfarin.  Return to ED immediately if severe re-bleeding, lightheadedness, or any other new concerning symptoms.     Full Code     Physical Therapy Adult Consult   Order Comments: Evaluate and treat as clinically indicated.    Reason:  weakness     Advance Diet as Tolerated   Order Comments: Follow this diet upon discharge: regular adult diet   Order  Specific Question Answer Comments   Is discharge order? Yes             Attestation:  Merry Akins PA-C  12/06/17

## 2017-12-06 NOTE — PROGRESS NOTES
Emergency Medicine Observation Attending note     The patient was independently seen and examined by me. The chart, vital signs, and labs were reviewed. The patient's findings were discussed with the BELLA on the observation unit, and I agree with the findings of the note and the plan.     89 yo female, admitted to the observation unit after presenting to the ER with c/o epistaxis. She is on chronic coumadin. Both naris were packed in the ED with Merocel, with ongoing oozing. ENT saw pt, and also added thrombin and gelfoam. She was admitted for monitoring. Bleeding appears controlled at this time, though she continues to touch a klenex to her nose, and gets a small amt of bloody return to tissue. Oropharynx clear.     /90 (BP Location: Right arm)  Pulse 64  Temp 98.8  F (37.1  C) (Oral)  Resp 16  SpO2 95%       Exam:  General: awake, alert, NAD  HEENT: NC/AT, packing in both nares without evidence of ongoing bleeding at this time.  Neck: supple  Lungs: CTA-B  Heart: RRR, no M/R/G  Abd: soft, ND/NT  Ext: non-tender, no edema     Assessment/plan:  1. Epistaxis: stopped. On coumadin, which makes things more challenging. Will continue keflex for prophylaxis. Packing to stay in place until f/u with ENT in 5-7days. [place] spray did not get ordered yest (per ENT recs)-will order today. Will increase metoprolol slightly for better BP control. Pt very hesitant to d/c home. No indication for ongoing hospitalization. Her daughter wrote a note on her board that she feels she needs rehab. Will consult social work.

## 2017-12-06 NOTE — PROGRESS NOTES
ENT note    S: Patient reports some light blood tinged secretions but no bright red blood from nose.    O:  AOx3  Merocels in place, no bleeding  OC/OP clear w/o bleeding  Unlabored breathing on RA    A/P: Brie Gonzalez is a 89 year old female with a past medical history of atrial fibrillation on atenolol and coumadin, who presented to ED with right epistaxis. Her right nasal passage was cauterized by her primary care physician but she started bleeding again. ED provider placed bilateral Merocel packings. Once her blood pressure was within normal limits the slow oozing from her right nasal passage stopped. Additional surgiflow with thrombin and gelfoam dissolvable packing was placed anteriorly to create an nice plug.      - Staph coverage while Merocels are in place. Keflex would be a good option  - [place] saline spray to bilateral packings in nose every 2-4 hours while awake  - If nosebleed occurs, spray Afrin 5-10 times into each packing and apply firm pressure with fingers for full 15 minutes without peeking, do not use nose clips as pressure provided by them is not enough. If bleeding persists repeat once more. If it continues to persist go to the ED for management.   - Continue to monitor blood pressures. Contact PCP to address blood pressure management.   - ENT clinic referral for packing removal in 5-7 days.   - Contact ENT service with any questions or concerns.     Kenyatta Castro MD  ENT PGY#5

## 2017-12-06 NOTE — PLAN OF CARE
Problem: Patient Care Overview  Goal: Plan of Care/Patient Progress Review  Outcome: No Change  - Diagnostic tests and consults completed and resulted: No  - Vital signs normal or at patient baseline: Yes  - Adequate pain control on oral analgesics: Yes  - No rebleeding for 12 hours: No bleeding    SW in contact w dtr re: dc planning

## 2017-12-06 NOTE — PLAN OF CARE
Problem: Patient Care Overview  Goal: Plan of Care/Patient Progress Review  - Diagnostic tests and consults completed and resulted: No  - Vital signs normal or at patient baseline: Yes  - Adequate pain control on oral analgesics: Yes  - No rebleeding for 12 hours: No

## 2017-12-06 NOTE — PROGRESS NOTES
Social Work Services Discharge Note      Patient Name:  Brie Gonzalez     Anticipated Discharge Date:  12/6/2017    Discharge Disposition:   TCU:  [facility]  [phone] f: [phone]    Following MD:  Per Facility     Pre-Admission Screening (PAS) online form has been completed.  The Level of Care (LOC) is:  Determined  Confirmation Code is: [id number]  Patient/caregiver informed of referral to [facility] for Pre-Admission Screening for skilled nursing facility (SNF) placement and to expect a phone call post discharge from SNF.     Additional Services/Equipment Arranged:  None-dtr to transport to leave here at 5:30 as facility is anticipating pt there at 6pm.     Patient / Family response to discharge plan:  Pt and dtr, Tracey, have been involved in d/c planning and have requested TCU/Respite care. They are aware that pt will be a private pay, at TCU/Respite, and have no financial concerns.      Persons notified of above discharge plan:  Pt, dtr, Primary Team, [facility] admissions.    Staff Discharge Instructions:  Please fax discharge orders and signed hard scripts for any controlled substances.  Please print a packet and send with patient.     CTS Handoff completed:  NO    Medicare Notice of Rights provided to the patient/family:  NO

## 2017-12-06 NOTE — PROGRESS NOTES
Emergency Medicine Observation Attending note    The patient was independently seen and examined by me. The chart, vital signs, and labs were reviewed. The patient's findings were discussed with the BELLA on the observation unit, and I agree with the findings of the note and the plan.    89 yo female, admitted to the observation unit after presenting to the ER with c/o epistaxis. She is on chronic coumadin. Both naris were packed in the ED with Merocel, with ongoing oozing. ENT saw pt, and also added thrombin. She was admitted for monitoring. Bleeding appears controlled at this time.    /65 (BP Location: Right arm)  Pulse 64  Temp 98.3  F (36.8  C) (Oral)  Resp 20  SpO2 98%    Exam:  General: awake, alert, NAD  HEENT: NC/AT, packing in both nares without evidence of ongoing bleeding at this time.  Neck: supple  Lungs: CTA-B  Heart: RRR, no M/R/G  Abd: soft, ND/NT  Ext: non-tender, no edema    Assessment/plan:  1. Epistaxis: stopped. On coumadin, which makes things more challenging. Will continue keflex for prophylaxis. Packing to stay in place until f/u with ENT. Will continue to monitor for re-bleed.

## 2017-12-06 NOTE — PLAN OF CARE
Problem: Patient Care Overview  Goal: Plan of Care/Patient Progress Review  PT orders received and appreciated. Patient demonstrating independence with bed mobility, transfers, and ambulation 100 feet without assistive device. Patient reporting dizziness with ambulation but with no LOB. Encouraged to be up ambulating in hallway (use FWW if up independently) in order to increase tolerance to activity. Patient safe to dc home from mobility standpoint; recommend continuing HH PT and OT. PT to complete orders.

## 2017-12-06 NOTE — PROGRESS NOTES
Social Work Services Progress Note    Hospital Day: 1  Date of Initial Social Work Evaluation:  Not Completed  Collaborated with:  Pt, pt's dtrTracey ([phone])    Data:  SW consulted as pt and dtr requesting TCU placement. Pt admitted, to observation, for epistaxis. Pt has had four previous ED visits for same complaint at [facility] and [facility]. Pt resides alone in an independent senior apartment. PT has evaluated pt and there are no PT concerns if pt were to return home. Pt was receiving [facility] services through [facility] (RN, PT, HHA).     Intervention:  D/C Planning    Assessment:  Pt receptive to SW visit and enaged in d/c planning.  Pt appears quite anxious around d/c home today and is not feeling she will be able to manage at home. Pt and dtr are aware that TCU will be private pay and have no concerns with this. Pt and dtr offered choice and first preference is [facility] ([phone] F: [phone]) and second preference is [facility] ([phone] f: [phone]).     Plan:    Anticipated Disposition:  Facility:  Referrals initiated to above facilities and waiting to hear back.    Barriers to d/c plan:  None anticipated    Follow Up:  SW will continue to follow to facilitate d/c to TCU facility.

## 2017-12-06 NOTE — PLAN OF CARE
Problem: Patient Care Overview  Goal: Plan of Care/Patient Progress Review  Outcome: No Change  - Diagnostic tests and consults completed and resulted: No  - Vital signs normal or at patient baseline: Yes  - Adequate pain control on oral analgesics: Yes  - No rebleeding for 12 hours: No bleeding

## 2017-12-07 ENCOUNTER — AMBULATORY - HEALTHEAST (OUTPATIENT)
Dept: ADMINISTRATIVE | Facility: CLINIC | Age: 82
End: 2017-12-07

## 2017-12-07 NOTE — PLAN OF CARE
Problem: Patient Care Overview  Goal: Discharge Needs Assessment  - Diagnostic tests and consults completed and resulted: No, Waiting ENT  - Vital signs normal or at patient baseline: Yes  - Adequate pain control on oral analgesics: Yes  - No rebleeding for 12 hours: No bleeding

## 2017-12-07 NOTE — PROGRESS NOTES
Discharge instruction reviewed with pt and daughter.  Patient and daughter verbalized understanding. Nursing home discharge packet send with pt. All belonging and bottle meds was send with pt. PIV removed, patient transported to the main lobby. Family accompany to the main lobby. Patient discharged

## 2017-12-08 ENCOUNTER — OFFICE VISIT - HEALTHEAST (OUTPATIENT)
Dept: GERIATRICS | Facility: CLINIC | Age: 82
End: 2017-12-08

## 2017-12-08 DIAGNOSIS — F41.9 ANXIETY: ICD-10-CM

## 2017-12-08 DIAGNOSIS — F32.A DEPRESSION: ICD-10-CM

## 2017-12-08 DIAGNOSIS — I48.91 ATRIAL FIBRILLATION (H): ICD-10-CM

## 2017-12-08 DIAGNOSIS — R04.0 EPISTAXIS: ICD-10-CM

## 2017-12-08 DIAGNOSIS — I10 HTN (HYPERTENSION): ICD-10-CM

## 2017-12-08 DIAGNOSIS — G45.9 TIA (TRANSIENT ISCHEMIC ATTACK): ICD-10-CM

## 2017-12-11 ENCOUNTER — OFFICE VISIT (OUTPATIENT)
Dept: OTOLARYNGOLOGY | Facility: CLINIC | Age: 82
End: 2017-12-11

## 2017-12-11 VITALS — BODY MASS INDEX: 23.6 KG/M2 | HEIGHT: 61 IN | WEIGHT: 125 LBS

## 2017-12-11 DIAGNOSIS — I48.20 CHRONIC ATRIAL FIBRILLATION (H): ICD-10-CM

## 2017-12-11 DIAGNOSIS — R04.0 EPISTAXIS: Primary | ICD-10-CM

## 2017-12-11 DIAGNOSIS — I10 BENIGN ESSENTIAL HYPERTENSION: ICD-10-CM

## 2017-12-11 ASSESSMENT — PAIN SCALES - GENERAL: PAINLEVEL: NO PAIN (0)

## 2017-12-11 NOTE — PROGRESS NOTES
Dear Dr. Nolasco:    I had the pleasure of meeting Brie Gonzalez in consultation at the [facility] of the [facility] Otolaryngology Clinic at your request, for evaluation of epistaxis. The note from our visit follows.  I appreciate the opportunity to participate in the care of this pleasant patient.    Please feel free to contact me with any questions.    Sincerely yours,    Jie Sargent M.D., M.P.H.  , Laryngology  Director, [facility]  Otolaryngology- Head & Neck Surgery  [phone]          =====    History of Present Illness:   Brie Gonzalez is a pleasant 89-year-old female with a history of atrial fibrillation on coumadin and hypertension who was seen in the Emergency Room last week with epistaxis. She reports that she had some episodes of epistaxis, which was initially managed with pressure and Afrin, but later was treated with silver nitrate cautery. The epistaxis then worsened considerably and led to a visit to the Emergency Room. There she was noted to have right greater than left epistaxis. This was managed with bilateral Merocel packings, Surgiflow, thrombin, and gelfoam as well as correction of her hypertension. She reports that her blood pressure has been good since she was last seen, and she has not experienced any further acute bleeding. She is still on her coumadin at her usual dose.  Her last INR was checked on Tuesday and was 2.3. She returns today for pack removal.    Prior EPIC records were reviewed for this visit.    MEDICATIONS:     Current Outpatient Prescriptions   Medication Sig Dispense Refill     cephALEXin (KEFLEX) 500 MG capsule Take 1 capsule (500 mg) by mouth 2 times daily for 10 days 20 capsule 0     oxymetazoline (AFRIN) 0.05 % spray Spray 2 sprays into both nostrils daily as needed (nasal bleeding --please page MD/NP)  0     sodium chloride (OCEAN) 0.65 % nasal spray Spray 1 spray into both nostrils every 2  hours 1 Bottle 0     metoprolol (LOPRESSOR) 25 MG tablet Take 2 tablets (50 mg) by mouth daily 10 tablet 0     LORazepam (ATIVAN) 1 MG tablet Take 1 tablet (1 mg) by mouth every 8 hours as needed for anxiety 5 tablet 0     ACETAMINOPHEN PO Take 325 mg by mouth every 4 hours as needed for pain       calcium-vitamin D (CALTRATE) 600-400 MG-UNIT per tablet Take 1 tablet by mouth daily       VITAMIN D, CHOLECALCIFEROL, PO Take 1,000 Units by mouth daily       oxymetazoline (AFRIN) 0.05 % spray Spray 2 sprays into both nostrils 2 times daily as needed for congestion       SIMVASTATIN PO Take 20 mg by mouth At Bedtime       PAROXETINE HCL PO Take 10 mg by mouth daily       Warfarin Sodium (COUMADIN PO) Take 2.5 mg by mouth daily        ASPIRIN PO Take 81 mg by mouth every other day          ALLERGIES:    Allergies   Allergen Reactions     Penicillins Swelling       PAST MEDICAL HISTORY:   Past Medical History:   Diagnosis Date     Anxiety 2.5 years ago     Arthritis Hands for years     Atrial fibrillation (H)      Cerebral infarction (H) 4-5 years ago     Depressive disorder 2.5 years ago intermittent     Heart rate problem Afib 4-5 yrs ago     Hypertension 4-5 years ago        PAST SURGICAL HISTORY:   Past Surgical History:   Procedure Laterality Date     ADENOIDECTOMY  1930's     GYN SURGERY   1970's uterine fibroids     TONSILLECTOMY  1930's       HABITS/SOCIAL HISTORY:    Social History   Substance Use Topics     Smoking status: Never Smoker     Smokeless tobacco: Never Used     Alcohol use Yes      Comment: Rare       FAMILY HISTORY:    Family History   Problem Relation Age of Onset     Dementia Mother      CEREBROVASCULAR DISEASE Mother        REVIEW OF SYSTEMS:  The patient completed a comprehensive 11 point review of systems (below), which was reviewed. Positives are as noted below; pertinent findings are as noted in the HPI.     Patient Supplied Answers to Review of Systems   ENT ROS 12/11/2017   Constitutional  Weight loss, Appetite change, Problems with sleep   Neurology Dizzy spells   Psychology Frequently feeling depressed or sad, Frequently feeling anxious   Ears, Nose, Throat Hearing loss, Nasal congestion or drainage, Coughing up blood   Hematologic Easy bruising       PHYSICAL EXAMINATION:  General: The patient was alert and conversant, and in no acute distress. Patient accompanied by her daughter, who is an RN.  Eyes: Pupils round, conjunctiva and lids normal, sclera nonicteric.  Nose: Merocel packings bilaterally, no active epistaxis.  Oral cavity/oropharynx: No masses or lesions. Dentition in fair condition. Tongue mobility and palate elevation intact and symmetric. No bleeding seen in oropharynx.  Resp: Breathing comfortably, no stridor or stertor.  Neuro: Symmetric facial function.   Psych: Normal affect, pleasant and cooperative. Mildly confused, but daughter reports she received Ativan this AM for anxiety about this appointment.  Voice/speech: No significant dysphonia.  Extremities: No cyanosis, clubbing, or edema of the upper extremities.    Intake scores  Total Score for Last Patient-Answered VHI Questionnaire  VHI Total Score 12/11/2017   VHI Total Score 10     Total Score for Last Patient-Answered EAT Questionnaire  EAT Total Score 12/11/2017   EAT Total Score 0     Total Score for Last Patient-Answered CSI Questionnaire  CSI Total Score 12/11/2017   CSI Total Score 0         PROCEDURE:   Nasal pack removal  The Merocel packs were moistened bilaterally with Afrin, then gently removed. Old clot was suctioned along with the packs. Additional Afrin was applied after the packs were removed. No fresh bleeding was noted. The patient was observed for another 10-15m with no evidence of further bleeding.      IMPRESSION AND PLAN:   Brie Gonzalez presents for pack removal for epistaxis. She is chronically on coumadin for atrial fibrillation and also has hypertension.    Plan:  1) She will continue to optimize  moisture of the nose, including humidification and hydration with saline nasal spray/gels.  She could also consider a touch of Vaseline if crusting is noted. We discussed the importance of avoiding any trauma especially to the septum.  2) Ongoing good monitoring and management of blood pressure and INR.    She will return to the ENT clinic as needed. I appreciate the opportunity to participate in the care of this pleasant patient.

## 2017-12-11 NOTE — MR AVS SNAPSHOT
"              After Visit Summary   12/11/2017    Brie Gonzalez    MRN: [id number]           Patient Information     Date Of Birth          7/8/1925        Visit Information        Provider Department      12/11/2017 12:20 PM Jie Sargent MD  Health Ear Nose and Throat        Today's Diagnoses     Epistaxis    -  1    Chronic atrial fibrillation (H)        Benign essential hypertension          Care Instructions    1.  You were seen in the ENT Clinic today by Dr. Sargent.  If you have any questions or concerns after your appointment, please call [phone].  Press option #1 for scheduling related needs.  Press option #3 for Nurse advice.    Lita JACKSON, RN  [facility] ENT   Head & Neck Surgery               Follow-ups after your visit        Who to contact     Please call your clinic at [phone] to:    Ask questions about your health    Make or cancel appointments    Discuss your medicines    Learn about your test results    Speak to your doctor   If you have compliments or concerns about an experience at your clinic, or if you wish to file a complaint, please contact [facility] Patient Relations at [phone] or email us at [email or web address]         Additional Information About Your Visit        Care EveryWhere ID     This is your Care EveryWhere ID. This could be used by other organizations to access your [facility] medical records  [id number]        Your Vitals Were     Height BMI (Body Mass Index)                1.56 m (5' 1.42\") 23.3 kg/m2           Blood Pressure from Last 3 Encounters:   12/06/17 119/59   11/19/17 133/74   11/08/17 137/82    Weight from Last 3 Encounters:   12/11/17 56.7 kg (125 lb)   11/19/17 59 kg (130 lb)   09/22/15 61.2 kg (135 lb)              Today, you had the following     No orders found for display       Primary Care Provider Office Phone # Fax #    Philip Dacia [phone]       [place] " [facility] [address]  [facility] [address]        Equal Access to Services     MARILYNMIKO JONEL : Hadii alex sam jenniferky Soluisali, waaxda luqadaha, qaybta kapabloda seanrinkarissa, ana aleman leninyani ernandezalberto dayallyn bernardino dockery. So [facility] [phone].    ATENCIÓN: Si habla español, tiene a posada disposición servicios gratuitos de asistencia lingüística. Llame al [phone].    We comply with applicable federal civil rights laws and [place] laws. We do not discriminate on the basis of race, color, national origin, age, disability, sex, sexual orientation, or gender identity.            Thank you!     Thank you for choosing [facility] EAR NOSE AND THROAT  for your care. Our goal is always to provide you with excellent care. Hearing back from our patients is one way we can continue to improve our services. Please take a few minutes to complete the written survey that you may receive in the mail after your visit with us. Thank you!             Your Updated Medication List - Protect others around you: Learn how to safely use, store and throw away your medicines at www.disposemymeds.org.          This list is accurate as of: 12/11/17 11:59 PM.  Always use your most recent med list.                   Brand Name Dispense Instructions for use Diagnosis    ACETAMINOPHEN PO      Take 325 mg by mouth every 4 hours as needed for pain        ASPIRIN PO      Take 81 mg by mouth every other day        calcium-vitamin D 600-400 MG-UNIT per tablet    CALTRATE     Take 1 tablet by mouth daily        cephALEXin 500 MG capsule    KEFLEX    20 capsule    Take 1 capsule (500 mg) by mouth 2 times daily for 10 days    Epistaxis       COUMADIN PO      Take 2.5 mg by mouth daily        LORazepam 1 MG tablet    ATIVAN    5 tablet    Take 1 tablet (1 mg) by mouth every 8 hours as needed for anxiety    Anxiety       metoprolol 25 MG tablet    LOPRESSOR    10 tablet    Take 2 tablets (50 mg) by mouth daily    Chronic atrial fibrillation (H)       *  oxymetazoline 0.05 % spray    AFRIN     Spray 2 sprays into both nostrils 2 times daily as needed for congestion        * oxymetazoline 0.05 % spray    AFRIN     Spray 2 sprays into both nostrils daily as needed (nasal bleeding --please page MD/NP)    Epistaxis       PAROXETINE HCL PO      Take 10 mg by mouth daily        SIMVASTATIN PO      Take 20 mg by mouth At Bedtime        sodium chloride 0.65 % nasal spray    OCEAN    1 Bottle    Spray 1 spray into both nostrils every 2 hours    Epistaxis       VITAMIN D (CHOLECALCIFEROL) PO      Take 1,000 Units by mouth daily        * Notice:  This list has 2 medication(s) that are the same as other medications prescribed for you. Read the directions carefully, and ask your doctor or other care provider to review them with you.

## 2017-12-11 NOTE — LETTER
12/11/2017      RE: Brie Gonzalez  [address]   [place] [address]       Dear Dr. Nolasco:    I had the pleasure of meeting Brie Gonzalez in consultation at the [facility] of the [facility] Otolaryngology Clinic at your request, for evaluation of epistaxis. The note from our visit follows.  I appreciate the opportunity to participate in the care of this pleasant patient.    Please feel free to contact me with any questions.    Sincerely yours,    Jie Sargent M.D., M.P.H.  , Laryngology  Director, [facility]  Otolaryngology- Head & Neck Surgery  [phone]          =====    History of Present Illness:   Brie Gonzalez is a pleasant 89-year-old female with a history of atrial fibrillation on coumadin and hypertension who was seen in the Emergency Room last week with epistaxis. She reports that she had some episodes of epistaxis, which was initially managed with pressure and Afrin, but later was treated with silver nitrate cautery. The epistaxis then worsened considerably and led to a visit to the Emergency Room. There she was noted to have right greater than left epistaxis. This was managed with bilateral Merocel packings, Surgiflow, thrombin, and gelfoam as well as correction of her hypertension. She reports that her blood pressure has been good since she was last seen, and she has not experienced any further acute bleeding. She is still on her coumadin at her usual dose.  Her last INR was checked on Tuesday and was 2.3. She returns today for pack removal.    Prior EPIC records were reviewed for this visit.    MEDICATIONS:     Current Outpatient Prescriptions   Medication Sig Dispense Refill     cephALEXin (KEFLEX) 500 MG capsule Take 1 capsule (500 mg) by mouth 2 times daily for 10 days 20 capsule 0     oxymetazoline (AFRIN) 0.05 % spray Spray 2 sprays into both nostrils daily as needed (nasal bleeding --please page MD/NP)   0     sodium chloride (OCEAN) 0.65 % nasal spray Spray 1 spray into both nostrils every 2 hours 1 Bottle 0     metoprolol (LOPRESSOR) 25 MG tablet Take 2 tablets (50 mg) by mouth daily 10 tablet 0     LORazepam (ATIVAN) 1 MG tablet Take 1 tablet (1 mg) by mouth every 8 hours as needed for anxiety 5 tablet 0     ACETAMINOPHEN PO Take 325 mg by mouth every 4 hours as needed for pain       calcium-vitamin D (CALTRATE) 600-400 MG-UNIT per tablet Take 1 tablet by mouth daily       VITAMIN D, CHOLECALCIFEROL, PO Take 1,000 Units by mouth daily       oxymetazoline (AFRIN) 0.05 % spray Spray 2 sprays into both nostrils 2 times daily as needed for congestion       SIMVASTATIN PO Take 20 mg by mouth At Bedtime       PAROXETINE HCL PO Take 10 mg by mouth daily       Warfarin Sodium (COUMADIN PO) Take 2.5 mg by mouth daily        ASPIRIN PO Take 81 mg by mouth every other day          ALLERGIES:    Allergies   Allergen Reactions     Penicillins Swelling       PAST MEDICAL HISTORY:   Past Medical History:   Diagnosis Date     Anxiety 2.5 years ago     Arthritis Hands for years     Atrial fibrillation (H)      Cerebral infarction (H) 4-5 years ago     Depressive disorder 2.5 years ago intermittent     Heart rate problem Afib 4-5 yrs ago     Hypertension 4-5 years ago        PAST SURGICAL HISTORY:   Past Surgical History:   Procedure Laterality Date     ADENOIDECTOMY  1930's     GYN SURGERY   1970's uterine fibroids     TONSILLECTOMY  1930's       HABITS/SOCIAL HISTORY:    Social History   Substance Use Topics     Smoking status: Never Smoker     Smokeless tobacco: Never Used     Alcohol use Yes      Comment: Rare       FAMILY HISTORY:    Family History   Problem Relation Age of Onset     Dementia Mother      CEREBROVASCULAR DISEASE Mother        REVIEW OF SYSTEMS:  The patient completed a comprehensive 11 point review of systems (below), which was reviewed. Positives are as noted below; pertinent findings are as noted in the  HPI.     Patient Supplied Answers to Review of Systems   ENT ROS 12/11/2017   Constitutional Weight loss, Appetite change, Problems with sleep   Neurology Dizzy spells   Psychology Frequently feeling depressed or sad, Frequently feeling anxious   Ears, Nose, Throat Hearing loss, Nasal congestion or drainage, Coughing up blood   Hematologic Easy bruising       PHYSICAL EXAMINATION:  General: The patient was alert and conversant, and in no acute distress. Patient accompanied by her daughter, who is an RN.  Eyes: Pupils round, conjunctiva and lids normal, sclera nonicteric.  Nose: Merocel packings bilaterally, no active epistaxis.  Oral cavity/oropharynx: No masses or lesions. Dentition in fair condition. Tongue mobility and palate elevation intact and symmetric. No bleeding seen in oropharynx.  Resp: Breathing comfortably, no stridor or stertor.  Neuro: Symmetric facial function.   Psych: Normal affect, pleasant and cooperative. Mildly confused, but daughter reports she received Ativan this AM for anxiety about this appointment.  Voice/speech: No significant dysphonia.  Extremities: No cyanosis, clubbing, or edema of the upper extremities.    Intake scores  Total Score for Last Patient-Answered VHI Questionnaire  VHI Total Score 12/11/2017   VHI Total Score 10     Total Score for Last Patient-Answered EAT Questionnaire  EAT Total Score 12/11/2017   EAT Total Score 0     Total Score for Last Patient-Answered CSI Questionnaire  CSI Total Score 12/11/2017   CSI Total Score 0         PROCEDURE:   Nasal pack removal  The Merocel packs were moistened bilaterally with Afrin, then gently removed. Old clot was suctioned along with the packs. Additional Afrin was applied after the packs were removed. No fresh bleeding was noted. The patient was observed for another 10-15m with no evidence of further bleeding.      IMPRESSION AND PLAN:   Brie Gonzalez presents for pack removal for epistaxis. She is chronically on coumadin for  atrial fibrillation and also has hypertension.    Plan:  1) She will continue to optimize moisture of the nose, including humidification and hydration with saline nasal spray/gels.  She could also consider a touch of Vaseline if crusting is noted. We discussed the importance of avoiding any trauma especially to the septum.  2) Ongoing good monitoring and management of blood pressure and INR.    She will return to the ENT clinic as needed. I appreciate the opportunity to participate in the care of this pleasant patient.              Jie Sargent MD

## 2017-12-11 NOTE — NURSING NOTE
Chief Complaint   Patient presents with     Consult     hospital discharge for packing removal   José Antonio Brewer LPN

## 2017-12-11 NOTE — PATIENT INSTRUCTIONS
1.  You were seen in the ENT Clinic today by Dr. Sargent.  If you have any questions or concerns after your appointment, please call [phone].  Press option #1 for scheduling related needs.  Press option #3 for Nurse advice.    Lita GATICAN, RN  [facility] ENT   Head & Neck Surgery

## 2017-12-11 NOTE — NURSING NOTE
Pt seen by Dr. Sargent for removal of her nasal packing after ED visit.  Pt's packing was removed without incident.  Pt was monitored for a period of time for symptoms of re-bleeding.      Lita GATICAN, RN  [phone]  [facility]NT   Head & Neck Surgery   12/11/2017 1:16 PM

## 2017-12-11 NOTE — LETTER
12/11/2017       RE: Brie Gonzalez  [address]   [facility] [id number]     Dear Colleague,    Thank you for referring your patient, Brie Gonzalez, to the [facility] EAR NOSE AND THROAT at [facility]. Please see a copy of my visit note below.    Dear Dr. Nolasco:    I had the pleasure of meeting Brie Gonzalez in consultation at the [facility] the [facility] at your request, for evaluation of epistaxis. The note from our visit follows.  I appreciate the opportunity to participate in the care of this pleasant patient.    Please feel free to contact me with any questions.    Sincerely yours,    Jie Sargent M.D., M.P.H.  , Laryngology  Director, [facility]  Otolaryngology- Head & Neck Surgery  [phone]          =====    History of Present Illness:   Brie Gonzalez is a pleasant 89-year-old female with a history of atrial fibrillation on coumadin and hypertension who was seen in the Emergency Room last week with epistaxis. She reports that she had some episodes of epistaxis, which was initially managed with pressure and Afrin, but later was treated with silver nitrate cautery. The epistaxis then worsened considerably and led to a visit to the Emergency Room. There she was noted to have right greater than left epistaxis. This was managed with bilateral Merocel packings, Surgiflow, thrombin, and gelfoam as well as correction of her hypertension. She reports that her blood pressure has been good since she was last seen, and she has not experienced any further acute bleeding. She is still on her coumadin at her usual dose.  Her last INR was checked on Tuesday and was 2.3. She returns today for pack removal.    Prior EPIC records were reviewed for this visit.    MEDICATIONS:     Current Outpatient Prescriptions   Medication Sig Dispense Refill     cephALEXin (KEFLEX) 500 MG  capsule Take 1 capsule (500 mg) by mouth 2 times daily for 10 days 20 capsule 0     oxymetazoline (AFRIN) 0.05 % spray Spray 2 sprays into both nostrils daily as needed (nasal bleeding --please page MD/NP)  0     sodium chloride (OCEAN) 0.65 % nasal spray Spray 1 spray into both nostrils every 2 hours 1 Bottle 0     metoprolol (LOPRESSOR) 25 MG tablet Take 2 tablets (50 mg) by mouth daily 10 tablet 0     LORazepam (ATIVAN) 1 MG tablet Take 1 tablet (1 mg) by mouth every 8 hours as needed for anxiety 5 tablet 0     ACETAMINOPHEN PO Take 325 mg by mouth every 4 hours as needed for pain       calcium-vitamin D (CALTRATE) 600-400 MG-UNIT per tablet Take 1 tablet by mouth daily       VITAMIN D, CHOLECALCIFEROL, PO Take 1,000 Units by mouth daily       oxymetazoline (AFRIN) 0.05 % spray Spray 2 sprays into both nostrils 2 times daily as needed for congestion       SIMVASTATIN PO Take 20 mg by mouth At Bedtime       PAROXETINE HCL PO Take 10 mg by mouth daily       Warfarin Sodium (COUMADIN PO) Take 2.5 mg by mouth daily        ASPIRIN PO Take 81 mg by mouth every other day          ALLERGIES:    Allergies   Allergen Reactions     Penicillins Swelling       PAST MEDICAL HISTORY:   Past Medical History:   Diagnosis Date     Anxiety 2.5 years ago     Arthritis Hands for years     Atrial fibrillation (H)      Cerebral infarction (H) 4-5 years ago     Depressive disorder 2.5 years ago intermittent     Heart rate problem Afib 4-5 yrs ago     Hypertension 4-5 years ago        PAST SURGICAL HISTORY:   Past Surgical History:   Procedure Laterality Date     ADENOIDECTOMY  1930's     GYN SURGERY   1970's uterine fibroids     TONSILLECTOMY  1930's       HABITS/SOCIAL HISTORY:    Social History   Substance Use Topics     Smoking status: Never Smoker     Smokeless tobacco: Never Used     Alcohol use Yes      Comment: Rare       FAMILY HISTORY:    Family History   Problem Relation Age of Onset     Dementia Mother      CEREBROVASCULAR  DISEASE Mother        REVIEW OF SYSTEMS:  The patient completed a comprehensive 11 point review of systems (below), which was reviewed. Positives are as noted below; pertinent findings are as noted in the HPI.     Patient Supplied Answers to Review of Systems   ENT ROS 12/11/2017   Constitutional Weight loss, Appetite change, Problems with sleep   Neurology Dizzy spells   Psychology Frequently feeling depressed or sad, Frequently feeling anxious   Ears, Nose, Throat Hearing loss, Nasal congestion or drainage, Coughing up blood   Hematologic Easy bruising       PHYSICAL EXAMINATION:  General: The patient was alert and conversant, and in no acute distress. Patient accompanied by her daughter, who is an RN.  Eyes: Pupils round, conjunctiva and lids normal, sclera nonicteric.  Nose: Merocel packings bilaterally, no active epistaxis.  Oral cavity/oropharynx: No masses or lesions. Dentition in fair condition. Tongue mobility and palate elevation intact and symmetric. No bleeding seen in oropharynx.  Resp: Breathing comfortably, no stridor or stertor.  Neuro: Symmetric facial function.   Psych: Normal affect, pleasant and cooperative. Mildly confused, but daughter reports she received Ativan this AM for anxiety about this appointment.  Voice/speech: No significant dysphonia.  Extremities: No cyanosis, clubbing, or edema of the upper extremities.    Intake scores  Total Score for Last Patient-Answered VHI Questionnaire  VHI Total Score 12/11/2017   VHI Total Score 10     Total Score for Last Patient-Answered EAT Questionnaire  EAT Total Score 12/11/2017   EAT Total Score 0     Total Score for Last Patient-Answered CSI Questionnaire  CSI Total Score 12/11/2017   CSI Total Score 0         PROCEDURE:   Nasal pack removal  The Merocel packs were moistened bilaterally with Afrin, then gently removed. Old clot was suctioned along with the packs. Additional Afrin was applied after the packs were removed. No fresh bleeding was  noted. The patient was observed for another 10-15m with no evidence of further bleeding.      IMPRESSION AND PLAN:   Brie Gonzalez presents for pack removal for epistaxis. She is chronically on coumadin for atrial fibrillation and also has hypertension.    Plan:  1) She will continue to optimize moisture of the nose, including humidification and hydration with saline nasal spray/gels.  She could also consider a touch of Vaseline if crusting is noted. We discussed the importance of avoiding any trauma especially to the septum.  2) Ongoing good monitoring and management of blood pressure and INR.    She will return to the ENT clinic as needed. I appreciate the opportunity to participate in the care of this pleasant patient.         Again, thank you for allowing me to participate in the care of your patient.      Sincerely,    Jie Sargent MD

## 2017-12-12 ENCOUNTER — OFFICE VISIT - HEALTHEAST (OUTPATIENT)
Dept: GERIATRICS | Facility: CLINIC | Age: 82
End: 2017-12-12

## 2017-12-12 DIAGNOSIS — Z87.19 H/O DIVERTICULITIS OF COLON: ICD-10-CM

## 2017-12-12 DIAGNOSIS — I48.91 ATRIAL FIBRILLATION, UNSPECIFIED TYPE (H): ICD-10-CM

## 2017-12-12 DIAGNOSIS — Z87.898 HISTORY OF EPISTAXIS: ICD-10-CM

## 2017-12-12 DIAGNOSIS — I10 HTN (HYPERTENSION): ICD-10-CM

## 2017-12-12 DIAGNOSIS — F41.9 ANXIETY: ICD-10-CM

## 2017-12-12 ASSESSMENT — MIFFLIN-ST. JEOR: SCORE: 951.99

## 2017-12-18 ENCOUNTER — OFFICE VISIT - HEALTHEAST (OUTPATIENT)
Dept: GERIATRICS | Facility: CLINIC | Age: 82
End: 2017-12-18

## 2017-12-18 DIAGNOSIS — Z87.898 HISTORY OF EPISTAXIS: ICD-10-CM

## 2017-12-18 DIAGNOSIS — I10 ESSENTIAL HYPERTENSION: ICD-10-CM

## 2017-12-18 DIAGNOSIS — I48.91 ATRIAL FIBRILLATION, UNSPECIFIED TYPE (H): ICD-10-CM

## 2017-12-18 DIAGNOSIS — F41.9 ANXIETY: ICD-10-CM

## 2017-12-18 DIAGNOSIS — Z87.19 H/O DIVERTICULITIS OF COLON: ICD-10-CM

## 2017-12-18 ASSESSMENT — MIFFLIN-ST. JEOR: SCORE: 951.99

## 2017-12-21 ENCOUNTER — OFFICE VISIT - HEALTHEAST (OUTPATIENT)
Dept: GERIATRICS | Facility: CLINIC | Age: 82
End: 2017-12-21

## 2017-12-21 DIAGNOSIS — I10 ESSENTIAL HYPERTENSION: ICD-10-CM

## 2017-12-21 DIAGNOSIS — R04.0 EPISTAXIS: ICD-10-CM

## 2017-12-21 DIAGNOSIS — F32.A DEPRESSION: ICD-10-CM

## 2017-12-21 DIAGNOSIS — F41.9 ANXIETY: ICD-10-CM

## 2017-12-21 DIAGNOSIS — I48.91 ATRIAL FIBRILLATION, UNSPECIFIED TYPE (H): ICD-10-CM

## 2017-12-26 ENCOUNTER — AMBULATORY - HEALTHEAST (OUTPATIENT)
Dept: GERIATRICS | Facility: CLINIC | Age: 82
End: 2017-12-26

## 2017-12-30 ENCOUNTER — APPOINTMENT (OUTPATIENT)
Dept: GENERAL RADIOLOGY | Facility: CLINIC | Age: 82
End: 2017-12-30
Attending: EMERGENCY MEDICINE
Payer: COMMERCIAL

## 2017-12-30 ENCOUNTER — HOSPITAL ENCOUNTER (OUTPATIENT)
Facility: CLINIC | Age: 82
Setting detail: OBSERVATION
Discharge: HOME | End: 2017-12-31
Attending: EMERGENCY MEDICINE | Admitting: INTERNAL MEDICINE
Payer: COMMERCIAL

## 2017-12-30 DIAGNOSIS — R79.89 ELEVATED TROPONIN LEVEL: ICD-10-CM

## 2017-12-30 DIAGNOSIS — J40 BRONCHITIS: ICD-10-CM

## 2017-12-30 DIAGNOSIS — Z79.01 LONG-TERM (CURRENT) USE OF ANTICOAGULANTS: ICD-10-CM

## 2017-12-30 DIAGNOSIS — Z87.891 PERSONAL HISTORY OF TOBACCO USE, PRESENTING HAZARDS TO HEALTH: ICD-10-CM

## 2017-12-30 DIAGNOSIS — I48.91 ATRIAL FIBRILLATION, UNSPECIFIED TYPE (H): ICD-10-CM

## 2017-12-30 PROBLEM — J20.8 VIRAL BRONCHITIS: Status: ACTIVE | Noted: 2017-12-30

## 2017-12-30 LAB
ANION GAP SERPL CALCULATED.3IONS-SCNC: 8 MMOL/L (ref 3–14)
BASOPHILS # BLD AUTO: 0 10E9/L (ref 0–0.2)
BASOPHILS NFR BLD AUTO: 0.4 %
BUN SERPL-MCNC: 23 MG/DL (ref 7–30)
CALCIUM SERPL-MCNC: 8.4 MG/DL (ref 8.5–10.1)
CHLORIDE SERPL-SCNC: 102 MMOL/L (ref 94–109)
CO2 SERPL-SCNC: 25 MMOL/L (ref 20–32)
CREAT SERPL-MCNC: 0.95 MG/DL (ref 0.52–1.04)
CRP SERPL-MCNC: 64 MG/L (ref 0–8)
DIFFERENTIAL METHOD BLD: ABNORMAL
EOSINOPHIL # BLD AUTO: 0 10E9/L (ref 0–0.7)
EOSINOPHIL NFR BLD AUTO: 0 %
ERYTHROCYTE [DISTWIDTH] IN BLOOD BY AUTOMATED COUNT: 14.8 % (ref 10–15)
FLUAV+FLUBV RNA SPEC QL NAA+PROBE: NEGATIVE
FLUAV+FLUBV RNA SPEC QL NAA+PROBE: NEGATIVE
GFR SERPL CREATININE-BSD FRML MDRD: 55 ML/MIN/1.7M2
GLUCOSE SERPL-MCNC: 103 MG/DL (ref 70–99)
HCT VFR BLD AUTO: 35.2 % (ref 35–47)
HGB BLD-MCNC: 11.5 G/DL (ref 11.7–15.7)
IMM GRANULOCYTES # BLD: 0 10E9/L (ref 0–0.4)
IMM GRANULOCYTES NFR BLD: 0.5 %
INR PPP: 2.03 (ref 0.86–1.14)
LYMPHOCYTES # BLD AUTO: 1 10E9/L (ref 0.8–5.3)
LYMPHOCYTES NFR BLD AUTO: 17.4 %
MCH RBC QN AUTO: 32 PG (ref 26.5–33)
MCHC RBC AUTO-ENTMCNC: 32.7 G/DL (ref 31.5–36.5)
MCV RBC AUTO: 98 FL (ref 78–100)
MONOCYTES # BLD AUTO: 0.9 10E9/L (ref 0–1.3)
MONOCYTES NFR BLD AUTO: 15.6 %
NEUTROPHILS # BLD AUTO: 3.7 10E9/L (ref 1.6–8.3)
NEUTROPHILS NFR BLD AUTO: 66.1 %
NRBC # BLD AUTO: 0 10*3/UL
NRBC BLD AUTO-RTO: 0 /100
NT-PROBNP SERPL-MCNC: 7458 PG/ML (ref 0–1800)
PLATELET # BLD AUTO: 124 10E9/L (ref 150–450)
POTASSIUM SERPL-SCNC: 4 MMOL/L (ref 3.4–5.3)
PROCALCITONIN SERPL-MCNC: 0.09 NG/ML
RBC # BLD AUTO: 3.59 10E12/L (ref 3.8–5.2)
RSV RNA SPEC NAA+PROBE: POSITIVE
SODIUM SERPL-SCNC: 135 MMOL/L (ref 133–144)
SPECIMEN SOURCE: ABNORMAL
TROPONIN I SERPL-MCNC: 0.09 UG/L (ref 0–0.04)
TROPONIN I SERPL-MCNC: 0.13 UG/L (ref 0–0.04)
WBC # BLD AUTO: 5.6 10E9/L (ref 4–11)

## 2017-12-30 PROCEDURE — 94640 AIRWAY INHALATION TREATMENT: CPT | Performed by: EMERGENCY MEDICINE

## 2017-12-30 PROCEDURE — 85025 COMPLETE CBC W/AUTO DIFF WBC: CPT | Performed by: EMERGENCY MEDICINE

## 2017-12-30 PROCEDURE — 93010 ELECTROCARDIOGRAM REPORT: CPT | Mod: Z6 | Performed by: EMERGENCY MEDICINE

## 2017-12-30 PROCEDURE — 99207 ZZC APP CREDIT; MD BILLING SHARED VISIT: CPT | Performed by: PHYSICIAN ASSISTANT

## 2017-12-30 PROCEDURE — 71020 XR CHEST 2 VW: CPT

## 2017-12-30 PROCEDURE — 25000125 ZZHC RX 250: Performed by: EMERGENCY MEDICINE

## 2017-12-30 PROCEDURE — 25000132 ZZH RX MED GY IP 250 OP 250 PS 637

## 2017-12-30 PROCEDURE — 83880 ASSAY OF NATRIURETIC PEPTIDE: CPT | Performed by: EMERGENCY MEDICINE

## 2017-12-30 PROCEDURE — 84484 ASSAY OF TROPONIN QUANT: CPT | Performed by: EMERGENCY MEDICINE

## 2017-12-30 PROCEDURE — 80048 BASIC METABOLIC PNL TOTAL CA: CPT | Performed by: EMERGENCY MEDICINE

## 2017-12-30 PROCEDURE — G0378 HOSPITAL OBSERVATION PER HR: HCPCS

## 2017-12-30 PROCEDURE — 86140 C-REACTIVE PROTEIN: CPT | Performed by: PHYSICIAN ASSISTANT

## 2017-12-30 PROCEDURE — 93005 ELECTROCARDIOGRAM TRACING: CPT | Performed by: EMERGENCY MEDICINE

## 2017-12-30 PROCEDURE — 99285 EMERGENCY DEPT VISIT HI MDM: CPT | Mod: 25 | Performed by: EMERGENCY MEDICINE

## 2017-12-30 PROCEDURE — 99220 ZZC INITIAL OBSERVATION CARE,LEVL III: CPT | Mod: AI | Performed by: INTERNAL MEDICINE

## 2017-12-30 PROCEDURE — 87631 RESP VIRUS 3-5 TARGETS: CPT | Performed by: PHYSICIAN ASSISTANT

## 2017-12-30 PROCEDURE — 25000132 ZZH RX MED GY IP 250 OP 250 PS 637: Performed by: PHYSICIAN ASSISTANT

## 2017-12-30 PROCEDURE — 85610 PROTHROMBIN TIME: CPT | Performed by: EMERGENCY MEDICINE

## 2017-12-30 PROCEDURE — 84145 PROCALCITONIN (PCT): CPT | Performed by: EMERGENCY MEDICINE

## 2017-12-30 PROCEDURE — 86140 C-REACTIVE PROTEIN: CPT | Performed by: EMERGENCY MEDICINE

## 2017-12-30 RX ORDER — LIDOCAINE 40 MG/G
CREAM TOPICAL
Status: DISCONTINUED | OUTPATIENT
Start: 2017-12-30 | End: 2017-12-31 | Stop reason: HOSPADM

## 2017-12-30 RX ORDER — AZITHROMYCIN 250 MG/1
TABLET, FILM COATED ORAL
Status: ON HOLD | COMMUNITY
End: 2018-05-17

## 2017-12-30 RX ORDER — NALOXONE HYDROCHLORIDE 0.4 MG/ML
.1-.4 INJECTION, SOLUTION INTRAMUSCULAR; INTRAVENOUS; SUBCUTANEOUS
Status: DISCONTINUED | OUTPATIENT
Start: 2017-12-30 | End: 2017-12-31 | Stop reason: HOSPADM

## 2017-12-30 RX ORDER — PREDNISONE 20 MG/1
40 TABLET ORAL ONCE
Status: COMPLETED | OUTPATIENT
Start: 2017-12-30 | End: 2017-12-30

## 2017-12-30 RX ORDER — AZITHROMYCIN 250 MG/1
250 TABLET, FILM COATED ORAL DAILY
Status: DISCONTINUED | OUTPATIENT
Start: 2017-12-31 | End: 2017-12-31 | Stop reason: HOSPADM

## 2017-12-30 RX ORDER — METOPROLOL TARTRATE 50 MG
50 TABLET ORAL DAILY
Status: DISCONTINUED | OUTPATIENT
Start: 2017-12-31 | End: 2017-12-31 | Stop reason: HOSPADM

## 2017-12-30 RX ORDER — SIMVASTATIN 20 MG
20 TABLET ORAL AT BEDTIME
Status: DISCONTINUED | OUTPATIENT
Start: 2017-12-30 | End: 2017-12-31 | Stop reason: HOSPADM

## 2017-12-30 RX ORDER — IPRATROPIUM BROMIDE AND ALBUTEROL SULFATE 2.5; .5 MG/3ML; MG/3ML
3 SOLUTION RESPIRATORY (INHALATION) ONCE
Status: COMPLETED | OUTPATIENT
Start: 2017-12-30 | End: 2017-12-30

## 2017-12-30 RX ORDER — PAROXETINE 10 MG/1
10 TABLET, FILM COATED ORAL DAILY
Status: DISCONTINUED | OUTPATIENT
Start: 2017-12-31 | End: 2017-12-31 | Stop reason: HOSPADM

## 2017-12-30 RX ORDER — AMOXICILLIN 250 MG
2 CAPSULE ORAL 2 TIMES DAILY PRN
Status: DISCONTINUED | OUTPATIENT
Start: 2017-12-30 | End: 2017-12-31 | Stop reason: HOSPADM

## 2017-12-30 RX ORDER — PROCHLORPERAZINE MALEATE 5 MG
5 TABLET ORAL
Status: DISCONTINUED | OUTPATIENT
Start: 2017-12-30 | End: 2017-12-30 | Stop reason: CLARIF

## 2017-12-30 RX ORDER — ACETAMINOPHEN 325 MG/1
325-650 TABLET ORAL EVERY 4 HOURS PRN
Status: DISCONTINUED | OUTPATIENT
Start: 2017-12-30 | End: 2017-12-31 | Stop reason: HOSPADM

## 2017-12-30 RX ORDER — ALBUTEROL SULFATE 90 UG/1
2 AEROSOL, METERED RESPIRATORY (INHALATION) EVERY 4 HOURS PRN
Status: ON HOLD | COMMUNITY
End: 2018-05-17

## 2017-12-30 RX ORDER — ONDANSETRON 2 MG/ML
4 INJECTION INTRAMUSCULAR; INTRAVENOUS EVERY 6 HOURS PRN
Status: DISCONTINUED | OUTPATIENT
Start: 2017-12-30 | End: 2017-12-31 | Stop reason: HOSPADM

## 2017-12-30 RX ORDER — LEVALBUTEROL INHALATION SOLUTION 1.25 MG/3ML
1.25 SOLUTION RESPIRATORY (INHALATION)
Status: CANCELLED | OUTPATIENT
Start: 2017-12-30

## 2017-12-30 RX ORDER — ONDANSETRON 4 MG/1
4 TABLET, ORALLY DISINTEGRATING ORAL EVERY 6 HOURS PRN
Status: DISCONTINUED | OUTPATIENT
Start: 2017-12-30 | End: 2017-12-31 | Stop reason: HOSPADM

## 2017-12-30 RX ORDER — POLYETHYLENE GLYCOL 3350 17 G/17G
17 POWDER, FOR SOLUTION ORAL DAILY PRN
Status: DISCONTINUED | OUTPATIENT
Start: 2017-12-30 | End: 2017-12-31 | Stop reason: HOSPADM

## 2017-12-30 RX ORDER — IPRATROPIUM BROMIDE AND ALBUTEROL SULFATE 2.5; .5 MG/3ML; MG/3ML
3 SOLUTION RESPIRATORY (INHALATION)
Status: DISCONTINUED | OUTPATIENT
Start: 2017-12-30 | End: 2017-12-31 | Stop reason: HOSPADM

## 2017-12-30 RX ORDER — ASPIRIN 81 MG/1
81 TABLET, CHEWABLE ORAL EVERY OTHER DAY
Status: DISCONTINUED | OUTPATIENT
Start: 2017-12-31 | End: 2017-12-31 | Stop reason: HOSPADM

## 2017-12-30 RX ORDER — HYDROXYZINE HYDROCHLORIDE 25 MG/1
25 TABLET, FILM COATED ORAL EVERY 6 HOURS PRN
Status: DISCONTINUED | OUTPATIENT
Start: 2017-12-30 | End: 2017-12-31 | Stop reason: HOSPADM

## 2017-12-30 RX ORDER — AMOXICILLIN 250 MG
1 CAPSULE ORAL 2 TIMES DAILY PRN
Status: DISCONTINUED | OUTPATIENT
Start: 2017-12-30 | End: 2017-12-31 | Stop reason: HOSPADM

## 2017-12-30 RX ORDER — WARFARIN SODIUM 2.5 MG/1
2.5 TABLET ORAL ONCE
Status: COMPLETED | OUTPATIENT
Start: 2017-12-30 | End: 2017-12-30

## 2017-12-30 RX ADMIN — IPRATROPIUM BROMIDE AND ALBUTEROL SULFATE 3 ML: .5; 3 SOLUTION RESPIRATORY (INHALATION) at 12:53

## 2017-12-30 RX ADMIN — WARFARIN SODIUM 2.5 MG: 2.5 TABLET ORAL at 22:11

## 2017-12-30 RX ADMIN — SIMVASTATIN 20 MG: 20 TABLET, FILM COATED ORAL at 22:11

## 2017-12-30 RX ADMIN — IPRATROPIUM BROMIDE AND ALBUTEROL SULFATE 3 ML: .5; 3 SOLUTION RESPIRATORY (INHALATION) at 12:01

## 2017-12-30 RX ADMIN — PREDNISONE 40 MG: 20 TABLET ORAL at 12:05

## 2017-12-30 ASSESSMENT — ENCOUNTER SYMPTOMS
COUGH: 1
SHORTNESS OF BREATH: 1
WHEEZING: 1

## 2017-12-30 NOTE — ED NOTES
Pt arrived to the ED with c/o SOB, wheezing, cough and fevers at home. Pt's max oral temp was 99.9 F this morning. Pt was seen in clinic in [facility] run by [facility] and had a chest x-ray, flu swab and and labs completed. She was started on a Z pack and received two doses so far. Today pt arrived to the ED with worsening symptoms. Vitals stable, oxygen sats 93 percent on RA. Pt alert and oriented x4. Pt moved to an assisted living one week ago.

## 2017-12-30 NOTE — IP AVS SNAPSHOT
MRN:[id number]                      After Visit Summary   12/30/2017    Brie Gonzalez    MRN: [id number]           Thank you!     Thank you for choosing [place] for your care. Our goal is always to provide you with excellent care. Hearing back from our patients is one way we can continue to improve our services. Please take a few minutes to complete the written survey that you may receive in the mail after you visit with us. Thank you!        Patient Information     Date Of Birth          7/8/1925        About your hospital stay     You were admitted on:  December 30, 2017 You last received care in the:  Unit 6[facility] Observation [facility]    You were discharged on:  December 31, 2017        Reason for your hospital stay       You were hospitalized due to shortness of breath and wheezing and found to have likely viral bronchitis due to RSV.                  Who to Call     For medical emergencies, please call 911.  For non-urgent questions about your medical care, please call your primary care provider or clinic, [phone]          Attending Provider     Provider Specialty    Felix Arteaga MD Emergency Medicine    Stewart, MD Y[facility] Internal Medicine       Primary Care Provider Office Phone # Fax #    Philip Pederson [phone]       When to contact your care team       Call your primary doctor if you have any of the following: temperature greater than 102 or less than 95, chest pain, worsening shortness of breath or difficulty breathing.                  After Care Instructions     Activity       Your activity upon discharge: activity as tolerated            Diet       Follow this diet upon discharge: Orders Placed This Encounter      Combination Diet Low Saturated Fat Na <2400mg Diet, No Caffeine Diet            Discharge Instructions       Utilize your albuterol inhaler if you are feeling short of breath or wheezing at home.    Complete 2 more days of your prescribed Z-pack  to complete 5 day course, you will have one remaining dose as you received one while in the hospital.                  Follow-up Appointments     Adult [facility] Follow-up and recommended labs and tests       Follow up with primary care provider, ADONIS ARRIOLA, within 7 days for hospital follow- up.  No follow up labs or test are needed.      Appointments on [facility] and/or [facility] (with [facility] or [facility] provider or service). Call [phone] if you haven't heard regarding these appointments within 7 days of discharge.                  Pending Results     No orders found for last 3 day(s).            Statement of Approval     Ordered          12/31/17 1155  I have reviewed and agree with all the recommendations and orders detailed in this document.  EFFECTIVE NOW     Approved and electronically signed by:  Candy Puckett PA-C             Admission Information     Date & Time Provider Department Dept. Phone    12/30/2017 Yvan William MD Unit 6D Observation [facility] [place] [phone]      Your Vitals Were     Blood Pressure Temperature Respirations Weight Pulse Oximetry BMI (Body Mass Index)    163/76 97.9  F (36.6  C) (Oral) 16 60.3 kg (133 lb) 94% 24.79 kg/m2      Care EveryWhere ID     This is your Care EveryWhere ID. This could be used by other organizations to access your [place] medical records  [id number]        Equal Access to Services     MOHIT REMY : Hadii alex rodriguezo Soconrado, waaxda luqadaha, qaybta kaalmada adeegyada, ana dockery. So [facility] [phone].    ATENCIÓN: Si habla español, tiene a posada disposición servicios gratuitos de asistencia lingüística. Llame al [phone].    We comply with applicable federal civil rights laws and [place] laws. We do not discriminate on the basis of race, color, national origin, age, disability, sex, sexual orientation, or gender identity.               Review of your medicines      CONTINUE these medicines which have NOT  CHANGED        Dose / Directions    ACETAMINOPHEN PO        Dose:  325 mg   Take 325 mg by mouth every 4 hours as needed for pain   Refills:  0       albuterol 108 (90 BASE) MCG/ACT Inhaler   Commonly known as:  PROAIR HFA/PROVENTIL HFA/VENTOLIN HFA        Dose:  2 puff   Inhale 2 puffs into the lungs every 4 hours as needed for shortness of breath / dyspnea or wheezing   Refills:  0       ASPIRIN PO        Dose:  81 mg   Take 81 mg by mouth every other day   Refills:  0       azithromycin 250 MG tablet   Commonly known as:  ZITHROMAX        Take 2 tablets by mouth on day 1, then 1 tablet by mouth daily for 4 days   Refills:  0       calcium-vitamin D 600-400 MG-UNIT per tablet   Commonly known as:  CALTRATE        Dose:  1 tablet   Take 1 tablet by mouth daily   Refills:  0       COUMADIN PO        Dose:  2.5 mg   Take 2.5 mg by mouth daily   Refills:  0       LORAZEPAM PO        Dose:  0.5 mg   Take 0.5 mg by mouth nightly as needed for anxiety   Refills:  0       metoprolol 25 MG tablet   Commonly known as:  LOPRESSOR   Used for:  Chronic atrial fibrillation (H)        Dose:  50 mg   Take 2 tablets (50 mg) by mouth daily   Quantity:  10 tablet   Refills:  0       oxymetazoline 0.05 % spray   Commonly known as:  AFRIN        Dose:  2 spray   Spray 2 sprays into both nostrils 2 times daily as needed for congestion   Refills:  0       PAROXETINE HCL PO        Dose:  10 mg   Take 10 mg by mouth daily   Refills:  0       SIMVASTATIN PO        Dose:  20 mg   Take 20 mg by mouth At Bedtime   Refills:  0       sodium chloride 0.65 % nasal spray   Commonly known as:  OCEAN   Used for:  Epistaxis        Dose:  1 spray   Spray 1 spray into both nostrils every 2 hours   Quantity:  1 Bottle   Refills:  0       VITAMIN D (CHOLECALCIFEROL) PO        Dose:  1000 Units   Take 1,000 Units by mouth daily   Refills:  0               ANTIBIOTIC INSTRUCTION     You've Been Prescribed an Antibiotic - Now What?  Your healthcare team  thinks that you or your loved one might have an infection. Some infections can be treated with antibiotics, which are powerful, life-saving drugs. Like all medications, antibiotics have side effects and should only be used when necessary. There are some important things you should know about your antibiotic treatment.      Your healthcare team may run tests before you start taking an antibiotic.    Your team may take samples (e.g., from your blood, urine or other areas) to run tests to look for bacteria. These test can be important to determine if you need an antibiotic at all and, if you do, which antibiotic will work best.      Within a few days, your healthcare team might change or even stop your antibiotic.    Your team may start you on an antibiotic while they are working to find out what is making you sick.    Your team might change your antibiotic because test results show that a different antibiotic would be better to treat your infection.    In some cases, once your team has more information, they learn that you do not need an antibiotic at all. They may find out that you don't have an infection, or that the antibiotic you're taking won't work against your infection. For example, an infection caused by a virus can't be treated with antibiotics. Staying on an antibiotic when you don't need it is more likely to be harmful than helpful.      You may experience side effects from your antibiotic.    Like all medications, antibiotics have side effects. Some of these can be serious.    Let you healthcare team know if you have any known allergies when you are admitted to the hospital.    One significant side effect of nearly all antibiotics is the risk of severe and sometimes deadly diarrhea caused by Clostridium difficile (C. Difficile). This occurs when a person takes antibiotics because some good germs are destroyed. Antibiotic use allows C. diificile to take over, putting patients at high risk for this serious  infection.    As a patient or caregiver, it is important to understand your or your loved one's antibiotic treatment. It is especially important for caregivers to speak up when patients can't speak for themselves. Here are some important questions to ask your healthcare team.    What infection is this antibiotic treating and how do you know I have that infection?    What side effects might occur from this antibiotic?    How long will I need to take this antibiotic?    Is it safe to take this antibiotic with other medications or supplements (e.g., vitamins) that I am taking?     Are there any special directions I need to know about taking this antibiotic? For example, should I take it with food?    How will I be monitored to know whether my infection is responding to the antibiotic?    What tests may help to make sure the right antibiotic is prescribed for me?      Information provided by:  www.cdc.gov/getsmart  U.S. Department of Health and Human Services  Centers for disease Control and Prevention  National Center for Emerging and Zoonotic Infectious Diseases  Division of Healthcare Quality Promotion         Protect others around you: Learn how to safely use, store and throw away your medicines at www.disposemymeds.org.             Medication List: This is a list of all your medications and when to take them. Check marks below indicate your daily home schedule. Keep this list as a reference.      Medications           Morning Afternoon Evening Bedtime As Needed    ACETAMINOPHEN PO   Take 325 mg by mouth every 4 hours as needed for pain                                albuterol 108 (90 BASE) MCG/ACT Inhaler   Commonly known as:  PROAIR HFA/PROVENTIL HFA/VENTOLIN HFA   Inhale 2 puffs into the lungs every 4 hours as needed for shortness of breath / dyspnea or wheezing                                ASPIRIN PO   Take 81 mg by mouth every other day   Last time this was given:  81 mg on 12/31/2017  9:02 AM                                 azithromycin 250 MG tablet   Commonly known as:  ZITHROMAX   Take 2 tablets by mouth on day 1, then 1 tablet by mouth daily for 4 days   Last time this was given:  250 mg on 12/31/2017  9:02 AM                                calcium-vitamin D 600-400 MG-UNIT per tablet   Commonly known as:  CALTRATE   Take 1 tablet by mouth daily                                COUMADIN PO   Take 2.5 mg by mouth daily   Last time this was given:  2.5 mg on 12/30/2017 10:11 PM                                LORAZEPAM PO   Take 0.5 mg by mouth nightly as needed for anxiety                                metoprolol 25 MG tablet   Commonly known as:  LOPRESSOR   Take 2 tablets (50 mg) by mouth daily   Last time this was given:  50 mg on 12/31/2017  9:02 AM                                oxymetazoline 0.05 % spray   Commonly known as:  AFRIN   Spray 2 sprays into both nostrils 2 times daily as needed for congestion                                PAROXETINE HCL PO   Take 10 mg by mouth daily   Last time this was given:  10 mg on 12/31/2017  9:02 AM                                SIMVASTATIN PO   Take 20 mg by mouth At Bedtime   Last time this was given:  20 mg on 12/30/2017 10:11 PM                                sodium chloride 0.65 % nasal spray   Commonly known as:  OCEAN   Spray 1 spray into both nostrils every 2 hours                                VITAMIN D (CHOLECALCIFEROL) PO   Take 1,000 Units by mouth daily

## 2017-12-30 NOTE — ED PROVIDER NOTES
History     Chief Complaint   Patient presents with     Shortness of Breath     Wheezing     HPI  Brie oGnzalez is a 89 year old female with a history of atrial fibrillation, HTN, anxiety, and depression who presents with her daughter with shortness of breath and wheezing. The patient was seen yesterday in [facility] clinic for concerns of shortness of breath and productive cough. Workup included negative chest x-ray (see below), influenza swab, and labs which were largely negative. She was discharged on a 5 day course of Zithromax 250 mg as well as albuterol inhaler for possible bronchitis. Today, the patient's daughter reports the patient woke up this morning feeling more short of breath and with audible wheezing. The patient states her symptoms are worse when lying down, but denies any leg swelling or chest pain. The patient denies any history of asthma. Patient has a 5 year smoking history, but states she quit nearly 60 years ago.    Chest x-ray ([facility], 12/19/17):  Impression:  1. Stable mild cardiomegaly.  2. No evidence of an acute infiltrate.  3. Bilateral calcified pleural plaques, without significant change.    Past Medical History:   Diagnosis Date     Anxiety 2.5 years ago     Arthritis Hands for years     Atrial fibrillation (H)      Cerebral infarction (H) 4-5 years ago     Depressive disorder 2.5 years ago intermittent     Heart rate problem Afib 4-5 yrs ago     Hypertension 4-5 years ago       Past Surgical History:   Procedure Laterality Date     ADENOIDECTOMY  1930's     GYN SURGERY   1970's uterine fibroids     TONSILLECTOMY  1930's       Family History   Problem Relation Age of Onset     Dementia Mother      CEREBROVASCULAR DISEASE Mother        Social History   Substance Use Topics     Smoking status: Never Smoker     Smokeless tobacco: Never Used     Alcohol use Yes      Comment: Rare       Current Facility-Administered Medications   Medication     predniSONE (DELTASONE) tablet 40  mg     Current Outpatient Prescriptions   Medication     sodium chloride (OCEAN) 0.65 % nasal spray     metoprolol (LOPRESSOR) 25 MG tablet     calcium-vitamin D (CALTRATE) 600-400 MG-UNIT per tablet     VITAMIN D, CHOLECALCIFEROL, PO     SIMVASTATIN PO     PAROXETINE HCL PO     Warfarin Sodium (COUMADIN PO)     ASPIRIN PO     oxymetazoline (AFRIN) 0.05 % spray     LORazepam (ATIVAN) 1 MG tablet     ACETAMINOPHEN PO     oxymetazoline (AFRIN) 0.05 % spray        Allergies   Allergen Reactions     Penicillins Swelling         I have reviewed the Medications, Allergies, Past Medical and Surgical History, and Social History in the Epic system.    Review of Systems   Respiratory: Positive for cough, shortness of breath and wheezing.    Cardiovascular: Negative for chest pain and leg swelling.   All other systems reviewed and are negative.      Physical Exam   BP: 101/63  Heart Rate: 61  Temp: 97.9  F (36.6  C)  Weight: 60.3 kg (133 lb)  SpO2: 93 %      Physical Exam   Constitutional: She is oriented to person, place, and time. No distress.   HENT:   Head: Normocephalic and atraumatic.   Mouth/Throat: Oropharynx is clear and moist.   Eyes: Conjunctivae are normal. Pupils are equal, round, and reactive to light.   Neck: Normal range of motion. Neck supple.   Cardiovascular: Normal rate and intact distal pulses.    Pulmonary/Chest: Effort normal. No respiratory distress. She has wheezes. She has no rales. She exhibits no tenderness.   Abdominal: There is no tenderness. There is no rebound and no guarding.   Musculoskeletal: Normal range of motion. She exhibits no edema or tenderness.   Neurological: She is alert and oriented to person, place, and time.   Skin: Skin is warm and dry. No rash noted. She is not diaphoretic.   Psychiatric: She has a normal mood and affect. Her behavior is normal. Thought content normal.   Nursing note and vitals reviewed.      ED Course     ED Course     Procedures             EKG  Interpretation:      Interpreted by Felix Arteaga  Time reviewed: 405  Symptoms at time of EKG: sob  Rhythm: atrial fibrillation  Rate: normal  Axis: normal  Ectopy: none  Conduction: normal  ST Segments/ T Waves: No ST-T wave changes  Q Waves: none  Comparison to prior: Unchanged    Clinical Impression: atrial fibrillation            Critical Care time:  none           Labs Ordered and Resulted from Time of ED Arrival Up to the Time of Departure from the ED - No data to display         Assessments & Plan (with Medical Decision Making)   1.  Bronchitis   2.  Troponin elevation    89-year-old female who presents with cough and shortness of breath.  On exam she has some expiratory wheezing which was improved with the DuoNeb.  She also received prednisone 40 mg but still felt short of breath. Her lab evaluation was significant for a troponin of 0.132 and she had some lateral t wave inversions.  She had no chest pain.  I suspect her troponin elevation is for heart strain from her hypoxia.  I don't suspect ACS.  BNP was also mildly elevated at 7400, but chest xray showed only mild pulmonary edema.  I suspect this is a viral bronchitis, but she may benefit from diuresis.          I have reviewed the nursing notes.    I have reviewed the findings, diagnosis, plan and need for follow up with the patient.    New Prescriptions    No medications on file       Final diagnoses:   None   IMohsen, am serving as a trained medical scribe to document services personally performed by Felix Arteaga MD, based on the provider's statements to me.      Felix WHITT MD, was physically present and have reviewed and verified the accuracy of this note documented by Mohsen Ardon.       12/30/2017   [facility], EMERGENCY DEPARTMENT     Felix Arteaga MD  01/02/18 0106

## 2017-12-30 NOTE — IP AVS SNAPSHOT
Unit 6D Observation [address] [address]    Phone:  [phone]    Fax:  [phone]                                       After Visit Summary   12/30/2017    Brie Gonzalez    MRN: [id number]           After Visit Summary Signature Page     I have received my discharge instructions, and my questions have been answered. I have discussed any challenges I see with this plan with the nurse or doctor.    ..........................................................................................................................................  Patient/Patient Representative Signature      ..........................................................................................................................................  Patient Representative Print Name and Relationship to Patient    ..................................................               ................................................  Date                                            Time    ..........................................................................................................................................  Reviewed by Signature/Title    ...................................................              ..............................................  Date                                                            Time

## 2017-12-30 NOTE — PROGRESS NOTES
Social Work: Assessment with Discharge Plan    Patient Name:  Brie Gonzalez  :  1925  Age:  89 year old  MRN:  [id number]  Risk/Complexity Score:     Completed assessment with:  Pt's daughter, chart review    Presenting Information   Reason for Referral:  Discharge plan  Date of Intake:  2017  Referral Source:  Chart Review  Decision Maker:  Pt  Alternate Decision Maker:  Per daughter, Pt has a completed HPOA and daughter is the agent: Tracey Jenkins, (h) [phone], (c) [phone].  Health Care Directive:  Will bring in copy; Pt has a HPOA, POLST, and living will. Pt's daughter will contact Pt's [facility] today and request that they fax a copy to the hospital to be placed on Pt's chart.  Living Situation:  Assisted Living:  [facility]  Previous Functional Status:  Independent; Pt is considered independent at her residence, however there is 24/7 nursing staff, nurses' aides, meals, and housekeeping available. Pt has a cane and walker, but has not needed either since her release from TCU a week ago.  Patient and family understanding of hospitalization:  Per daughter, Pt had shortness of breath and wheezing when she awoke this morning.  Cultural/Language/Spiritual Considerations:  Per chart, Pt identifies as Confucianist. Pt's primary language is English.   Adjustment to Illness:  Pt is sleeping during interview. Per daughter, Pt had done well at rehab recently and is hoping to return to baseline.    Physical Health  Reason for Admission:  No diagnosis found.  Services Needed/Recommended:  Home with homecare; Pt currently has home PT/OT and RN for INR checks.    Mental Health/Chemical Dependency  Diagnosis:  Per chart, Pt has a history of anxiety and depression.  Support/Services in Place:  Medication management, family support  Services Needed/Recommended:  Ongoing management.    Support System  Significant relationship at present time:  Daughters, family  Family of origin is available for  support:  Pt has one daughter, Tracey, nearby and another in [place]. Tracey and her family are available for support.  Other support available:  [facility] staff  Gaps in support system:  None noted  Patient is caregiver to:  None     Provider Information   Primary Care Physician:  Philip Pederson   [phone]   Clinic:  [facility] [address] / [address]      :  None    Financial   Income Source:  Retired  Financial Concerns:  None noted  Insurance:    Payor/Plan Subscriber Name Rel Member # Group #   [employer] - [employer]* [facility]  [id number]      [address] [address]       Discharge Plan   Patient and family discharge goal:  Pt hopes to return to her [facility] at baseline.  Provided education on discharge plan:  Pending further workup  Patient agreeable to discharge plan:  Pending recommendations.  A list of Medicare Certified Facilities was provided to the patient and/or family to encourage patient choice. Patient's choices for facility are:  Pt recently had a two-week stay at Health system, which daughter felt was a positive experience. She is not anticipating TCU need at this time.  Will NH provide Skilled rehabilitation or complex medical:  N/A  General information regarding anticipated insurance coverage and possible out of pocket cost was discussed. Patient and patient's family are aware patient may incur the cost of transportation to the facility, pending insurance payment: N/A  Barriers to discharge:  Medical clearance.    Discharge Recommendations   Anticipated Disposition:  Home, no needs identified; pending further workup.  Transportation Needs:  Family:  Pt's daughter may be available for transportation.  Name of Transportation Company and Phone:  N/A    Additional comments   Pt had recently stayed at [facility] for two weeks of rehab and had to pay privately as she did not meet Medicare criteria. Pt's daughter states that Pt did well and was able to  "move into her new assisted living apartment at [facility] with an \"independent\" designation. Pt has a cane and walker, but hasn't needed to use either since rehab. Pt and daughter are hoping that Pt will be able to return home to [facility].      Oliva Espinosa, [facility]  Emergency Department   Pager: [phone]      Addendum, 7:56pm    Pt's daughter asked SW if the Pt's healthcare directives had been received from [facility]. Writer checked the fax and Pt's chart. The POLST and [employer] forms were received and placed on Pt's chart. Writer informed Pt's daughter.  "

## 2017-12-30 NOTE — H&P
[facility], [place]    Internal Medicine History and Physical - Gold Service       Date of Admission:  12/30/2017    Assessment & Plan   Brie Gonzalez is a 89 year old female with a past medical history of HTN, a fib (on warfarin), depression, anxiety, TIA admitted on 12/30/2017 for cough, dyspnea, found to have elevated troponin and abnormal EKG in ED, admitted for further w/u.     #Acute bronchitis, likely viral in nature: Admitted w/ 4 days of cough, 2 days of worsening cough, weakness, dyspnea. Evaluated at OSH w/ tmax of 99.7, Rapid influenza panel negative, CXR w/o infiltrate. Worsening symptoms this AM, CXR w/ streaky perihilar atelectasis vs. Pulmonary edema, no focal infiltrate. Lungs w/ crackles in bilateral bases, expiratory wheezing at bases. WBC count normal. VSS, tmax 97.9 here. S/p neb x2 and prednisone in ED with improvement.   -Influenza PCR ordered  -Scheduled duonebs  -Continue azithromycin  -CRP and procal added on  -IS   -Consider further steroids tomorrow pending clinical course  -See below for cardiac w/u    #Elevated trop & BNP: No chest pain. Trop 0.132, BNP 7458. EKG w/ T wave inversion in V6, flattening in V5 and I. Endorses orthopnea last evening. CXR w/ volume, exam w/ crackles, not hypoxic. Most likely demand ischemia vs. Heart failure, less likely ACS.  Wt Readings from Last 3 Encounters:   12/30/17 60.3 kg (133 lb)   12/11/17 56.7 kg (125 lb)   11/19/17 59 kg (130 lb)   -Trop x2 tonight  -Continue aspirin & BB  -EKG if chest pain  -ECHO in AM  -Consider cards consult pending above    #Afib, HTN: PTA maintained on warfarin for AC, metoprolol 50 mg qday for rate control. INR 2.03 today. EKG w/ a fib, rate 82.   -Continue PTA medications  -Pharmacy to manage warfarin    #Normocytic anemia: Hgb of 11.5 w/ MCV of 98. 10 at OSH 12/29. Monitor qday, sooner if any s/s of bleeding.   #Thrombocytopenia: Likely related to illness, platelet count of 124, no s/s of  bleeding. PTA platelet count 101 at OSH 12/29. Monitor qday, sooner if any s/s of bleeding.     Chronic medical issues:  #CKD stage 3: Creat of 0.95, GFR of 55 which is baseline per OP lab review. Monitor qday, avoid nephrotoxics as able.   #HLD: Continue statin.  #Anxiety, Depression: PTA maintained on Paxil 10 mg qday, PRN xanax. Continue paxil, hold xanax.     Diet:  Regular  Fluids: PO  DVT Prophylaxis: Warfarin, Pneumatic Compression Devices and Ambulate every shift  Code Status: Prior    Disposition Plan   Expected discharge: 2 - 3 days; recommended to prior living arrangement vs. TCU once antibiotic plan established, safe disposition plan/ TCU bed available and SIRS/Sepsis treated.     Entered: Bridget Sanchez 12/30/2017, 4:40 PM   Information in the above section will display in the discharge planner report.    The patient's care was discussed with the Attending Physician, Dr. GONSALVES, Bedside Nurse and Patient.    GUZMAN Jansen  Internal Medicine Staff Hospitalist Service  [facility]  Pager: [phone]  Please see sticky note for cross cover information  ______________________________________________________________________    Chief Complaint   Shortness of breath, cough    History is obtained from the patient    History of Present Illness   Brie Gonzalez is a 89 year old female with a past medical history of HTN, a fib (on warfarin), depression, anxiety, TIA admitted on 12/30/2017 for cough, dyspnea, found to have elevated troponin and abnormal EKG in ED, admitted for further w/u.     The patient notes that about 4 days ago she started to notice a cough, but otherwise felt well. She notes that yesterday her cough was worse and she had some shortness of breath. She notes she went to the clinic, was given azithromycin and has had 2 doses. She has had weakness, fatigue the last 2 days. This morning she felt worse, more short of breath last night, both at rest and with exertion,  worse with lying flat last night, which prompted her return to the ED. She notes her cough was quite productive yesterday of yellow sputum. She notes her cough is maybe a bit better today, but hard to tell. She is living in a new AL, many sick contacts. She notes some sweatiness overnight, but otherwise no fevers/chills. No LE edema. She denies any urinary symptoms, small loose BM today but otherwise no diarrhea, no nausea/vomiting, no rashes.     Review of Systems   The 10 point Review of Systems is negative other than noted in the HPI or here.     Past Medical History    I have reviewed this patient's medical history and updated it with pertinent information if needed.   Past Medical History:   Diagnosis Date     Anxiety 2.5 years ago     Arthritis Hands for years     Atrial fibrillation (H)      Cerebral infarction (H) 4-5 years ago     Depressive disorder 2.5 years ago intermittent     Hypertension 4-5 years ago        Past Surgical History   I have reviewed this patient's surgical history and updated it with pertinent information if needed.  Past Surgical History:   Procedure Laterality Date     ADENOIDECTOMY  1930's     GYN SURGERY   1970's uterine fibroids     TONSILLECTOMY  1930's        Social History   Social History   Substance Use Topics     Smoking status: Never Smoker     Smokeless tobacco: Never Used     Alcohol use Yes      Comment: Rare   Lives in an assisted living. She has a daughter in the area, 1 son.     Family History   I have reviewed this patient's family history and updated it with pertinent information if needed.   Family History   Problem Relation Age of Onset     Dementia Mother      CEREBROVASCULAR DISEASE Mother        Prior to Admission Medications   Prior to Admission Medications   Prescriptions Last Dose Informant Patient Reported? Taking?   ACETAMINOPHEN PO Unknown at Unknown time  Yes No   Sig: Take 325 mg by mouth every 4 hours as needed for pain   ASPIRIN PO 12/30/2017 at  Unknown time  Yes Yes   Sig: Take 81 mg by mouth every other day    LORazepam (ATIVAN) 1 MG tablet Unknown at Unknown time  No No   Sig: Take 1 tablet (1 mg) by mouth every 8 hours as needed for anxiety   PAROXETINE HCL PO 12/30/2017 at Unknown time  Yes Yes   Sig: Take 10 mg by mouth daily   SIMVASTATIN PO 12/29/2017 at Unknown time  Yes Yes   Sig: Take 20 mg by mouth At Bedtime   VITAMIN D, CHOLECALCIFEROL, PO 12/30/2017 at Unknown time  Yes Yes   Sig: Take 1,000 Units by mouth daily   Warfarin Sodium (COUMADIN PO) 12/29/2017 at Unknown time  Yes Yes   Sig: Take 2.5 mg by mouth daily    calcium-vitamin D (CALTRATE) 600-400 MG-UNIT per tablet 12/30/2017 at Unknown time  Yes Yes   Sig: Take 1 tablet by mouth daily   metoprolol (LOPRESSOR) 25 MG tablet 12/30/2017 at Unknown time  No Yes   Sig: Take 2 tablets (50 mg) by mouth daily   oxymetazoline (AFRIN) 0.05 % spray Unknown at Unknown time  Yes No   Sig: Spray 2 sprays into both nostrils 2 times daily as needed for congestion   oxymetazoline (AFRIN) 0.05 % spray Unknown at Unknown time  Yes No   Sig: Spray 2 sprays into both nostrils daily as needed (nasal bleeding --please page MD/NP)   sodium chloride (OCEAN) 0.65 % nasal spray 12/30/2017 at Unknown time  No Yes   Sig: Spray 1 spray into both nostrils every 2 hours      Facility-Administered Medications: None     Allergies   Allergies   Allergen Reactions     Penicillins Swelling       Physical Exam   Vital Signs: Temp: 97.9  F (36.6  C) Temp src: Oral BP: 121/61   Heart Rate: 62 Resp: 17 SpO2: 97 % O2 Device: None (Room air)    Weight: 133 lbs 0 oz  GENERAL: Alert and oriented x 3. Sitting comfortably in bed.   HEENT: Anicteric sclera. Mucous membranes moist and without lesions.   CV: RRR. S1, S2. No murmurs appreciated.   RESPIRATORY: Effort normal on RA. Lungs with crackles in bases and bibasilar wheezing.   GI: Abdomen soft and non distended, bowel sounds present. No tenderness, rebound, guarding.    MUSCULOSKELETAL: No joint swelling or tenderness. Moves all extremities.   NEUROLOGICAL: No focal deficits.   EXTREMITIES: No peripheral edema. Intact bilateral pedal pulses.   SKIN: No jaundice. No rashes.       Data   Data reviewed today: I reviewed all medications, new labs and imaging results over the last 24 hours. I personally reviewed the chest x-ray image(s) showing perihilar opacities, EKG as above.     Reviewed BMP, BNP, Trop, CBC, glucose, INR

## 2017-12-31 ENCOUNTER — APPOINTMENT (OUTPATIENT)
Dept: PHYSICAL THERAPY | Facility: CLINIC | Age: 82
End: 2017-12-31
Attending: PHYSICIAN ASSISTANT
Payer: COMMERCIAL

## 2017-12-31 ENCOUNTER — APPOINTMENT (OUTPATIENT)
Dept: CARDIOLOGY | Facility: CLINIC | Age: 82
End: 2017-12-31
Attending: PHYSICIAN ASSISTANT
Payer: COMMERCIAL

## 2017-12-31 VITALS
BODY MASS INDEX: 24.79 KG/M2 | TEMPERATURE: 97.9 F | SYSTOLIC BLOOD PRESSURE: 163 MMHG | OXYGEN SATURATION: 94 % | DIASTOLIC BLOOD PRESSURE: 76 MMHG | WEIGHT: 133 LBS | RESPIRATION RATE: 16 BRPM

## 2017-12-31 LAB
ALBUMIN SERPL-MCNC: 2.9 G/DL (ref 3.4–5)
ALP SERPL-CCNC: 63 U/L (ref 40–150)
ALT SERPL W P-5'-P-CCNC: 22 U/L (ref 0–50)
ANION GAP SERPL CALCULATED.3IONS-SCNC: 9 MMOL/L (ref 3–14)
AST SERPL W P-5'-P-CCNC: 27 U/L (ref 0–45)
BILIRUB SERPL-MCNC: 0.9 MG/DL (ref 0.2–1.3)
BUN SERPL-MCNC: 20 MG/DL (ref 7–30)
CALCIUM SERPL-MCNC: 8.9 MG/DL (ref 8.5–10.1)
CHLORIDE SERPL-SCNC: 101 MMOL/L (ref 94–109)
CO2 SERPL-SCNC: 23 MMOL/L (ref 20–32)
CREAT SERPL-MCNC: 0.8 MG/DL (ref 0.52–1.04)
ERYTHROCYTE [DISTWIDTH] IN BLOOD BY AUTOMATED COUNT: 14.3 % (ref 10–15)
GFR SERPL CREATININE-BSD FRML MDRD: 67 ML/MIN/1.7M2
GLUCOSE SERPL-MCNC: 128 MG/DL (ref 70–99)
HCT VFR BLD AUTO: 32.4 % (ref 35–47)
HGB BLD-MCNC: 10.8 G/DL (ref 11.7–15.7)
INR PPP: 2.74 (ref 0.86–1.14)
INTERPRETATION ECG - MUSE: NORMAL
MCH RBC QN AUTO: 32 PG (ref 26.5–33)
MCHC RBC AUTO-ENTMCNC: 33.3 G/DL (ref 31.5–36.5)
MCV RBC AUTO: 96 FL (ref 78–100)
PLATELET # BLD AUTO: 129 10E9/L (ref 150–450)
POTASSIUM SERPL-SCNC: 4 MMOL/L (ref 3.4–5.3)
PROT SERPL-MCNC: 6.2 G/DL (ref 6.8–8.8)
RBC # BLD AUTO: 3.37 10E12/L (ref 3.8–5.2)
SODIUM SERPL-SCNC: 133 MMOL/L (ref 133–144)
TROPONIN I SERPL-MCNC: 0.08 UG/L (ref 0–0.04)
WBC # BLD AUTO: 4.6 10E9/L (ref 4–11)

## 2017-12-31 PROCEDURE — 40000809 ZZH STATISTIC NO DOCUMENTATION TO SUPPORT CHARGE

## 2017-12-31 PROCEDURE — 93306 TTE W/DOPPLER COMPLETE: CPT | Mod: 26 | Performed by: INTERNAL MEDICINE

## 2017-12-31 PROCEDURE — 93306 TTE W/DOPPLER COMPLETE: CPT

## 2017-12-31 PROCEDURE — 40000894 ZZH STATISTIC OT IP EVAL DEFER

## 2017-12-31 PROCEDURE — 94640 AIRWAY INHALATION TREATMENT: CPT | Mod: 76

## 2017-12-31 PROCEDURE — 85610 PROTHROMBIN TIME: CPT | Performed by: PHYSICIAN ASSISTANT

## 2017-12-31 PROCEDURE — 85027 COMPLETE CBC AUTOMATED: CPT | Performed by: PHYSICIAN ASSISTANT

## 2017-12-31 PROCEDURE — 25000132 ZZH RX MED GY IP 250 OP 250 PS 637: Performed by: PHYSICIAN ASSISTANT

## 2017-12-31 PROCEDURE — 40000274 ZZH STATISTIC RCP CONSULT EA 30 MIN

## 2017-12-31 PROCEDURE — 40000193 ZZH STATISTIC PT WARD VISIT

## 2017-12-31 PROCEDURE — 40000275 ZZH STATISTIC RCP TIME EA 10 MIN

## 2017-12-31 PROCEDURE — 80053 COMPREHEN METABOLIC PANEL: CPT | Performed by: PHYSICIAN ASSISTANT

## 2017-12-31 PROCEDURE — 97161 PT EVAL LOW COMPLEX 20 MIN: CPT | Mod: GP

## 2017-12-31 PROCEDURE — 99217 ZZC OBSERVATION CARE DISCHARGE: CPT | Performed by: INTERNAL MEDICINE

## 2017-12-31 PROCEDURE — 99207 ZZC APP CREDIT; MD BILLING SHARED VISIT: CPT | Performed by: PHYSICIAN ASSISTANT

## 2017-12-31 PROCEDURE — 25000125 ZZHC RX 250: Performed by: PHYSICIAN ASSISTANT

## 2017-12-31 PROCEDURE — 94799 UNLISTED PULMONARY SVC/PX: CPT

## 2017-12-31 PROCEDURE — 36415 COLL VENOUS BLD VENIPUNCTURE: CPT | Performed by: PHYSICIAN ASSISTANT

## 2017-12-31 PROCEDURE — G0378 HOSPITAL OBSERVATION PER HR: HCPCS

## 2017-12-31 PROCEDURE — 25500064 ZZH RX 255 OP 636: Performed by: INTERNAL MEDICINE

## 2017-12-31 PROCEDURE — 94640 AIRWAY INHALATION TREATMENT: CPT

## 2017-12-31 PROCEDURE — 97530 THERAPEUTIC ACTIVITIES: CPT | Mod: GP

## 2017-12-31 PROCEDURE — 84484 ASSAY OF TROPONIN QUANT: CPT | Performed by: PHYSICIAN ASSISTANT

## 2017-12-31 RX ADMIN — IPRATROPIUM BROMIDE AND ALBUTEROL SULFATE 3 ML: .5; 3 SOLUTION RESPIRATORY (INHALATION) at 12:54

## 2017-12-31 RX ADMIN — METOPROLOL TARTRATE 50 MG: 50 TABLET, FILM COATED ORAL at 09:02

## 2017-12-31 RX ADMIN — HUMAN ALBUMIN MICROSPHERES AND PERFLUTREN 6 ML: 10; .22 INJECTION, SOLUTION INTRAVENOUS at 12:45

## 2017-12-31 RX ADMIN — ASPIRIN 81 MG CHEWABLE TABLET 81 MG: 81 TABLET CHEWABLE at 09:02

## 2017-12-31 RX ADMIN — IPRATROPIUM BROMIDE AND ALBUTEROL SULFATE 3 ML: .5; 3 SOLUTION RESPIRATORY (INHALATION) at 00:23

## 2017-12-31 RX ADMIN — AZITHROMYCIN 250 MG: 250 TABLET, FILM COATED ORAL at 09:02

## 2017-12-31 RX ADMIN — IPRATROPIUM BROMIDE AND ALBUTEROL SULFATE 3 ML: .5; 3 SOLUTION RESPIRATORY (INHALATION) at 08:02

## 2017-12-31 RX ADMIN — HYDROXYZINE HYDROCHLORIDE 25 MG: 25 TABLET ORAL at 06:19

## 2017-12-31 RX ADMIN — PAROXETINE 10 MG: 10 TABLET, FILM COATED ORAL at 09:02

## 2017-12-31 NOTE — PHARMACY-ADMISSION MEDICATION HISTORY
"Admission medication history interview status for the 12/30/2017 admission is complete. See Epic admission navigator for allergy information, pharmacy, prior to admission medications and immunization status.     Medication history interview sources:  Patient, Family member, med list    Changes made to PTA medication list (reason)  Added: albuterol, azithromycin  Deleted: oxymetazoline (duplicate)  Changed: Lorazepam: 1 mg po q8h prn--> 0.5 mg po qhs prn    Additional medication history information (including reliability of information, actions taken by pharmacist):  Patient was slightly confused. Patient's family member and med list provided most information.  Confirmed pharmacy at either [facility] or [facility]  Allergies confirmed  Influenza vaccine: Yes, 09/2017  Warfarin: Current regimen is 2.5 mg every day of the week. Goal INR is between 2 and 3 . 12/29 INR was at 2.4. INR usually monitored by [facility] or [facility].  Azithromycin was prescribed for \"suspected respiratory infection\". Regimen was started on 12/29, and will last until 1/04  Patient reports using sodium chloride nasal rinse \"4 to 5 times daily\" to help prevent any nasal bleeding.        Prior to Admission medications    Medication Sig Last Dose Taking? Auth Provider   LORAZEPAM PO Take 0.5 mg by mouth nightly as needed for anxiety 12/25/2017 at PRN Yes Unknown, Entered By History   azithromycin (ZITHROMAX) 250 MG tablet Take 2 tablets by mouth on day 1, then 1 tablet by mouth daily for 4 days 12/30/2017 at Unknown time Yes Unknown, Entered By History   albuterol (PROAIR HFA/PROVENTIL HFA/VENTOLIN HFA) 108 (90 BASE) MCG/ACT Inhaler Inhale 2 puffs into the lungs every 4 hours as needed for shortness of breath / dyspnea or wheezing 12/30/2017 at Unknown time Yes Unknown, Entered By History   sodium chloride (OCEAN) 0.65 % nasal spray Spray 1 spray into both nostrils every 2 hours 12/30/2017 at Unknown time " Yes Merry Akins PA-C   metoprolol (LOPRESSOR) 25 MG tablet Take 2 tablets (50 mg) by mouth daily 12/30/2017 at Unknown time Yes Merry Akins PA-C   calcium-vitamin D (CALTRATE) 600-400 MG-UNIT per tablet Take 1 tablet by mouth daily 12/30/2017 at Unknown time Yes Unknown, Entered By History   VITAMIN D, CHOLECALCIFEROL, PO Take 1,000 Units by mouth daily 12/30/2017 at Unknown time Yes Unknown, Entered By History   oxymetazoline (AFRIN) 0.05 % spray Spray 2 sprays into both nostrils 2 times daily as needed for congestion Past Week at PRN Yes Unknown, Entered By History   SIMVASTATIN PO Take 20 mg by mouth At Bedtime 12/29/2017 at Unknown time Yes Unknown, Entered By History   PAROXETINE HCL PO Take 10 mg by mouth daily 12/30/2017 at Unknown time Yes Unknown, Entered By History   Warfarin Sodium (COUMADIN PO) Take 2.5 mg by mouth daily  12/29/2017 at Unknown time Yes Reported, Patient   ASPIRIN PO Take 81 mg by mouth every other day  12/29/2017 at Unknown time Yes Reported, Patient   ACETAMINOPHEN PO Take 325 mg by mouth every 4 hours as needed for pain More than a month at PRN  Unknown, Entered By History         Medication history completed by: ERIKA Cornejo2 Student Pharmacist

## 2017-12-31 NOTE — PROVIDER NOTIFICATION
JOHNNY Pennington paged RE does provider want to continue duonebs?     Patient can receive duonebs. Echo completed this afternoon, pt does not need ordered second echo.

## 2017-12-31 NOTE — PLAN OF CARE
Problem: Patient Care Overview  Goal: Discharge Needs Assessment  Outpatient/Observation goals to be met before discharge home:       diagnostic tests and consults completed and resulted-No  -vital signs normal or at patient baseline- Yes  -tolerating oral intake to maintain hydration -Yes  -infection is improving - Ongoing  -dyspnea improved and O2 sats greater than 88% on room air or prior home oxygen levels- Yes, 97% RA  -safe disposition plan has been identified - No  Nurse to notify provider when observation goals have been met and patient is ready for discharge.

## 2017-12-31 NOTE — PLAN OF CARE
Problem: Patient Care Overview  Goal: Discharge Needs Assessment  Outpatient/Observation goals to be met before discharge home:       Outpatient/Observation goals to be met before discharge home:        diagnostic tests and consults completed and resulted-No  -vital signs normal or at patient baseline- Yes  -tolerating oral intake to maintain hydration -Yes  -infection is improving - Ongoing  -dyspnea improved and O2 sats greater than 88% on room air or prior home oxygen levels- Yes, 97% RA  -safe disposition plan has been identified - No  Nurse to notify provider when observation goals have been met and patient is ready for discharge.

## 2017-12-31 NOTE — PROGRESS NOTES
Pt ambulated to the bathroom with an assist of one. 1 BM. Pt saw blood on the toilet paper after BM.  After she said she feels tremors in both arms and feels her anxiety coming back. Pt recieved Atarax. Pt feels a decrease in anxiety. Will continue to monitor.

## 2017-12-31 NOTE — DISCHARGE SUMMARY
[facility], [place]    Internal Medicine Discharge Summary- Gold Service    Date of Admission:  12/30/2017  Date of Discharge:  12/31/2017  Discharging Provider: Candy Puckett PA-C/ Dr. Norris  Discharge Team: Gold 2    Discharge Diagnoses   Acute viral bronchitis  Elevated trop and BNP  Afib  HTN  Normocytic anemia  CKD III  Anxiety  Depression    Follow-ups Needed After Discharge   F/u w/ PCP within one week of discharge for general f/u    Hospital Course   Brie Gonzalez is a 89 year old female w/ a pmh of HTN, A fib (on warfarin), depression, anxiety, TIA admitted on 12/20/17 due to viral bronchitis.    # RSV Bronchitis. Admitted w/ 4 day hx of cough, weakness and dypsnea. Tmax 99.7. No white count. Procalcitonin negative. No hx of asthma or COPD. CXR w/ streaky perihilar atelectasis vs. Pulmonary edema, no focal infiltrate. Expiratory wheeze on exam. RSV +. Started duonebs, continued azithromycin (prescribed PTA).  Continues to have faint expiratory wheeze on exam, but clinically improved on day of discharge. Pt has albuterol inhaler at home that she will utilize prn.   - f/u with PCP if worsening respiratory symptoms    Elevated Troponin and BNP. No chest pain, Troponin elevated to 0.132 on admission, BNP 7458. Likely demand ischemia in the setting or respiratory illness. Troponin trended down to 0.081 prior to discharge.     Afib, HTN. Continue PTA coumadin and metoprolol.    Normocyctic anemia. Hgb 10.8, MCV 96. No s/s of bleeding.     CKD stage 3. Creatinine at BL.  HLD. Continued statin.   Anxiety, depression. Held PTA xanax on admission, resumed on discharge as needed.     Consultations This Hospital Stay   MEDICATION HISTORY IP PHARMACY CONSULT  PHYSICAL THERAPY ADULT IP CONSULT  OCCUPATIONAL THERAPY ADULT IP CONSULT  PHARMACY TO DOSE WARFARIN     Code Status   DNR / DNI    Time Spent on this Encounter   I, Candy Puckett, personally saw the patient today and spent greater  than 30 minutes discharging this patient.       Candy Puckett  Internal Medicine Staff Hospitalist Service  [facility]  Pager: [phone]  ______________________________________________________________________    Physical Exam   Vital Signs: Temp: 97.9  F (36.6  C) Temp src: Oral BP: 163/76   Heart Rate: 89 Resp: 16 SpO2: 94 % O2 Device: None (Room air)    Weight: 133 lbs 0 oz    General Appearance: Alert and oriented x 3, NAD  Respiratory: Lungs clear on inspiration, faint diffuse expiratory wheeze  Cardiovascular: RRR, no murmurs or gallops  GI: Abdomen soft and non distended, non tender to palpation  Skin: warm and dry to touch, no rashes or excoriations  Other: no LE edema    Significant Results and Procedures   Most Recent 3 CBC's:  Recent Labs   Lab Test  12/31/17   0724  12/30/17   1252  12/06/17   0757  12/05/17   0425   WBC  4.6  5.6   --   7.2   HGB  10.8*  11.5*  12.1  12.6   MCV  96  98   --   97   PLT  129*  124*   --   228       Pending Results   These results will be followed up by PCP  Unresulted Labs Ordered in the Past 30 Days of this Admission     No orders found for last 61 day(s).             Primary Care Physician   ADONIS ARRIOLA    Discharge Disposition   Discharged to home  Condition at discharge: Stable    Discharge Orders     Reason for your hospital stay   You were hospitalized due to shortness of breath and wheezing and found to have likely viral bronchitis due to RSV.     Activity   Your activity upon discharge: activity as tolerated     When to contact your care team   Call your primary doctor if you have any of the following: temperature greater than 102 or less than 95, chest pain, worsening shortness of breath or difficulty breathing.     Discharge Instructions   Utilize your albuterol inhaler if you are feeling short of breath or wheezing at home.    Complete 2 more days of your prescribed Z-pack to complete 5 day course, you will have one remaining dose as  you received one while in the hospital.     Adult [facility] Follow-up and recommended labs and tests   Follow up with primary care provider, ADONIS ARRIOLA, within 7 days for hospital follow- up.  No follow up labs or test are needed.      Appointments on [place] and/or [facility] (with [facility] or [facility] provider or service). Call [phone] if you haven't heard regarding these appointments within 7 days of discharge.     Diet   Follow this diet upon discharge: Orders Placed This Encounter     Combination Diet Low Saturated Fat Na <2400mg Diet, No Caffeine Diet       Discharge Medications   Current Discharge Medication List      CONTINUE these medications which have NOT CHANGED    Details   LORAZEPAM PO Take 0.5 mg by mouth nightly as needed for anxiety      azithromycin (ZITHROMAX) 250 MG tablet Take 2 tablets by mouth on day 1, then 1 tablet by mouth daily for 4 days      albuterol (PROAIR HFA/PROVENTIL HFA/VENTOLIN HFA) 108 (90 BASE) MCG/ACT Inhaler Inhale 2 puffs into the lungs every 4 hours as needed for shortness of breath / dyspnea or wheezing      sodium chloride (OCEAN) 0.65 % nasal spray Spray 1 spray into both nostrils every 2 hours  Qty: 1 Bottle, Refills: 0    Associated Diagnoses: Epistaxis      metoprolol (LOPRESSOR) 25 MG tablet Take 2 tablets (50 mg) by mouth daily  Qty: 10 tablet, Refills: 0    Associated Diagnoses: Chronic atrial fibrillation (H)      calcium-vitamin D (CALTRATE) 600-400 MG-UNIT per tablet Take 1 tablet by mouth daily      VITAMIN D, CHOLECALCIFEROL, PO Take 1,000 Units by mouth daily      oxymetazoline (AFRIN) 0.05 % spray Spray 2 sprays into both nostrils 2 times daily as needed for congestion      SIMVASTATIN PO Take 20 mg by mouth At Bedtime      PAROXETINE HCL PO Take 10 mg by mouth daily      Warfarin Sodium (COUMADIN PO) Take 2.5 mg by mouth daily       ASPIRIN PO Take 81 mg by mouth every other day       ACETAMINOPHEN PO Take 325 mg by mouth every 4 hours as needed for  pain           Allergies   Allergies   Allergen Reactions     Penicillins Swelling

## 2017-12-31 NOTE — PROGRESS NOTES
12/31/17 0847   Quick Adds   Type of Visit Initial PT Evaluation   Living Environment   Lives With alone   Living Arrangements assisted living   Home Accessibility no concerns   Number of Stairs to Enter Home 0   Number of Stairs Within Home 0   Stair Railings at Home none   Transportation Available family or friend will provide   Living Environment Comment Lives in assisted living. No concerns for accessibility   Self-Care   Usual Activity Tolerance good   Current Activity Tolerance poor   Regular Exercise yes   Activity/Exercise Type walking;other (see comments)  (yoga)   Exercise Amount/Frequency 2 times/wk   Equipment Currently Used at Home cane, straight;walker, rolling;shower chair   Activity/Exercise/Self-Care Comment Indep with self cares at baseline. Recently moved to home with 24 hour care   Functional Level Prior   Ambulation 1-->assistive equipment   Transferring 0-->independent   Toileting 1-->assistive equipment   Bathing 2-->assistive person   Dressing 2-->assistive person   Eating 0-->independent   Communication 0-->understands/communicates without difficulty   Swallowing 0-->swallows foods/liquids without difficulty   Cognition 0 - no cognition issues reported   Fall history within last six months no   Which of the above functional risks had a recent onset or change? ambulation   Prior Functional Level Comment Patient indep at baseline.    General Information   Onset of Illness/Injury or Date of Surgery - Date 12/30/17   Referring Physician Bridget Gutierres PA-C   Patient/Family Goals Statement No PT goals formally expressed   Pertinent History of Current Problem (include personal factors and/or comorbidities that impact the POC) Brie Gonzalez is a 89 year old female with a past medical history of HTN, a fib (on warfarin), depression, anxiety, TIA admitted on 12/30/2017 for cough, dyspnea, found to have elevated troponin and abnormal EKG in ED, admitted for further w/u.    General Info  Comments Activity: amb with assist   Cognitive Status Examination   Orientation orientation to person, place and time   Level of Consciousness alert   Follows Commands and Answers Questions 100% of the time;able to follow multistep instructions   Personal Safety and Judgment intact   Memory intact   Pain Assessment   Patient Currently in Pain No   Integumentary/Edema   Integumentary/Edema no deficits were identifed   Posture    Posture Not impaired   Range of Motion (ROM)   ROM Quick Adds No deficits were identified   Strength   Manual Muscle Testing Quick Adds No deficits were identified   Bed Mobility   Bed Mobility Comments Indep   Transfer Skills   Transfer Comments Indep   Gait   Gait Comments CGA for amb using SPC 10 feet x2   Balance   Balance Comments NO overt LOB during ambulation   Sensory Examination   Sensory Perception no deficits were identified   Coordination   Coordination no deficits were identified   Muscle Tone   Muscle Tone no deficits were identified   General Therapy Interventions   Planned Therapy Interventions gait training;balance training;strengthening;risk factor education;home program guidelines;progressive activity/exercise   Clinical Impression   Criteria for Skilled Therapeutic Intervention yes, treatment indicated   PT Diagnosis Impaired functional mobility   Influenced by the following impairments Decreased tolerance to activity   Functional limitations due to impairments Decreased indep with ambulation   Clinical Presentation Stable/Uncomplicated   Clinical Presentation Rationale No significant PMH affecting PT POC   Clinical Decision Making (Complexity) Low complexity   Therapy Frequency` other (see comments)  (1 eval/treat)   Predicted Duration of Therapy Intervention (days/wks) 1 eval/treat   Anticipated Equipment Needs at Discharge (none anticipated)   Anticipated Discharge Disposition Home with Assist   Risk & Benefits of therapy have been explained Yes   Patient, Family &  "other staff in agreement with plan of care Yes   [facility] TM \"6 Clicks\"   2016, Trustees of [facility], under license to [employer].  All rights reserved.   6 Clicks Short Forms Basic Mobility Inpatient Short Form   [facility]  \"6 Clicks\" V.2 Basic Mobility Inpatient Short Form   1. Turning from your back to your side while in a flat bed without using bedrails? 4 - None   2. Moving from lying on your back to sitting on the side of a flat bed without using bedrails? 4 - None   3. Moving to and from a bed to a chair (including a wheelchair)? 4 - None   4. Standing up from a chair using your arms (e.g., wheelchair, or bedside chair)? 4 - None   5. To walk in hospital room? 3 - A Little   6. Climbing 3-5 steps with a railing? 3 - A Little   Basic Mobility Raw Score (Score out of 24.Lower scores equate to lower levels of function) 22   Total Evaluation Time   Total Evaluation Time (Minutes) 8     "

## 2017-12-31 NOTE — PROVIDER NOTIFICATION
Text paged [facility] Med Re:  Pt positive for RSV. Pt asked about her Coumadin dose for the night.

## 2017-12-31 NOTE — PLAN OF CARE
Problem: Patient Care Overview  Goal: Plan of Care/Patient Progress Review  OT/6D - OT deferred. Pt is currently observation status. PT has already initiated and made safe discharge recs for pt to return to retirement with assist PRN when medically appropriate. Per chart review and discussion with PT, no acute OT needs at this time. Will complete OT orders. Please re-consult if new needs arise.

## 2017-12-31 NOTE — PLAN OF CARE
Problem: Patient Care Overview  Goal: Discharge Needs Assessment  Outpatient/Observation goals to be met before discharge home:    diagnostic tests and consults completed and resulted   -vital signs normal or at patient baseline- Yes  -tolerating oral intake to maintain hydration -Yes  -infection is improving - Not yet  -dyspnea improved and O2 sats greater than 88% on room air or prior home oxygen levels- Yes, 97% RA  -safe disposition plan has been identified - Not yet  Nurse to notify provider when observation goals have been met and patient is ready for discharge.    Pt ate, voided. /68  Temp 97.5  F (36.4  C) (Oral)  Resp 17  Wt 60.3 kg (133 lb)  SpO2 97%  BMI 24.79 kg/m2

## 2017-12-31 NOTE — PLAN OF CARE
Problem: Patient Care Overview  Goal: Plan of Care/Patient Progress Review  Outcome: Adequate for Discharge Date Met: 12/31/17  Outpatient/Observation goals to be met before discharge home:          diagnostic tests and consults completed and resulted-YES  -vital signs normal or at patient baseline- Yes  -tolerating oral intake to maintain hydration -Yes  -infection is improving - PENDING  -dyspnea improved and O2 sats greater than 88% on room air or prior home oxygen levels- YES  -safe disposition plan has been identified - YES     Pt cleared for d/c. Discharge instructions reviewed, understood, and signed by patient. VSS, PIV removed, new medications reviewed and understood, patient has all belongings. Patient left unit via wheelchair with family.

## 2017-12-31 NOTE — PLAN OF CARE
Problem: Patient Care Overview  Goal: Plan of Care/Patient Progress Review  Outpatient/Observation goals to be met before discharge home:         diagnostic tests and consults completed and resulted-YES  -vital signs normal or at patient baseline- Yes  -tolerating oral intake to maintain hydration -Yes  -infection is improving - PENDING  -dyspnea improved and O2 sats greater than 88% on room air or prior home oxygen levels- YES  -safe disposition plan has been identified - NO    Pt A&Ox4, VSS, and denies pain. C/o dyspnea with exertion, STA while ambulating. C/o tremor, provider notified and neb treatments discontinued. PT eval complete, per therapist pt safe to d/c to home. D/C this afternoon.    Nurse to notify provider when observation goals have been met and patient is ready for discharge.

## 2017-12-31 NOTE — PLAN OF CARE
Problem: Patient Care Overview  Goal: Plan of Care/Patient Progress Review  Discharge Planner PT   Patient plan for discharge: Home vs TCU; pt concerned regarding blood in stool and reporting not feeling safe to dc home d/t this concern  Current status: Patient demos independence with bed mobility and transfers. Using cane for ambulation (does not use AD at baseline) and with CGA, able to walk to/from bathroom. Patient with tremor but not unsteady during ambulation, no overt LOB or lateral path deviations. Recommend use of FWW for ambulation in room and into hallway. Recommend patient ambulate 4 times daily using FWW and with SBA - CGA of nursing staff or family to increase tolerance to activity.   Barriers to return to prior living situation: None from PT standpoint  Recommendations for discharge: Patient likely safe to dc back to [facility] with increased cares as needed; would recommend  PT to progress activity tolerance  Rationale for recommendations: see above       Entered by: Jacquelin Hamlin 12/31/2017 9:53 AM

## 2017-12-31 NOTE — PHARMACY-ANTICOAGULATION SERVICE
Clinical Pharmacy - Warfarin Dosing Consult     Pharmacy has been consulted to manage this patient s warfarin therapy.  Indication: Atrial Fibrillation  Therapy Goal: INR 2-3  Warfarin Prior to Admission: Yes  Warfarin PTA Regimen: 2.5 mg daily  Significant drug interactions: azithromycin, aspirin    INR   Date Value Ref Range Status   12/30/2017 2.03 (H) 0.86 - 1.14 Final   12/06/2017 2.57 (H) 0.86 - 1.14 Final       Recommend warfarin 2.5 mg today.  Pharmacy will monitor Brie Gonzalez daily and order warfarin doses to achieve specified goal.      Please contact pharmacy as soon as possible if the warfarin needs to be held for a procedure or if the warfarin goals change.        Vanesa Graham, Pharm.D.

## 2018-01-01 ENCOUNTER — APPOINTMENT (OUTPATIENT)
Dept: GENERAL RADIOLOGY | Facility: CLINIC | Age: 83
End: 2018-01-01
Attending: EMERGENCY MEDICINE
Payer: COMMERCIAL

## 2018-01-01 ENCOUNTER — HOSPITAL ENCOUNTER (EMERGENCY)
Facility: CLINIC | Age: 83
Discharge: HOME | End: 2018-01-01
Attending: EMERGENCY MEDICINE | Admitting: EMERGENCY MEDICINE
Payer: COMMERCIAL

## 2018-01-01 VITALS
BODY MASS INDEX: 22.15 KG/M2 | TEMPERATURE: 99 F | SYSTOLIC BLOOD PRESSURE: 137 MMHG | RESPIRATION RATE: 18 BRPM | OXYGEN SATURATION: 96 % | HEIGHT: 65 IN | WEIGHT: 132.94 LBS | DIASTOLIC BLOOD PRESSURE: 93 MMHG

## 2018-01-01 DIAGNOSIS — R06.02 SOB (SHORTNESS OF BREATH): ICD-10-CM

## 2018-01-01 DIAGNOSIS — J20.5 ACUTE BRONCHITIS DUE TO RESPIRATORY SYNCYTIAL VIRUS (RSV): ICD-10-CM

## 2018-01-01 DIAGNOSIS — N30.00 ACUTE CYSTITIS WITHOUT HEMATURIA: ICD-10-CM

## 2018-01-01 LAB
ALBUMIN SERPL-MCNC: 3.1 G/DL (ref 3.4–5)
ALBUMIN UR-MCNC: 10 MG/DL
ALP SERPL-CCNC: 66 U/L (ref 40–150)
ALT SERPL W P-5'-P-CCNC: 32 U/L (ref 0–50)
AMORPH CRY #/AREA URNS HPF: ABNORMAL /HPF
ANION GAP SERPL CALCULATED.3IONS-SCNC: 9 MMOL/L (ref 3–14)
APPEARANCE UR: CLEAR
APTT PPP: 51 SEC (ref 22–37)
AST SERPL W P-5'-P-CCNC: 46 U/L (ref 0–45)
BACTERIA #/AREA URNS HPF: ABNORMAL /HPF
BASOPHILS # BLD AUTO: 0 10E9/L (ref 0–0.2)
BASOPHILS NFR BLD AUTO: 0.3 %
BILIRUB SERPL-MCNC: 0.8 MG/DL (ref 0.2–1.3)
BILIRUB UR QL STRIP: NEGATIVE
BUN SERPL-MCNC: 22 MG/DL (ref 7–30)
CALCIUM SERPL-MCNC: 8.7 MG/DL (ref 8.5–10.1)
CHLORIDE SERPL-SCNC: 101 MMOL/L (ref 94–109)
CO2 SERPL-SCNC: 23 MMOL/L (ref 20–32)
COLOR UR AUTO: YELLOW
CREAT SERPL-MCNC: 0.93 MG/DL (ref 0.52–1.04)
DIFFERENTIAL METHOD BLD: ABNORMAL
EOSINOPHIL # BLD AUTO: 0 10E9/L (ref 0–0.7)
EOSINOPHIL NFR BLD AUTO: 0.2 %
ERYTHROCYTE [DISTWIDTH] IN BLOOD BY AUTOMATED COUNT: 14.4 % (ref 10–15)
GFR SERPL CREATININE-BSD FRML MDRD: 57 ML/MIN/1.7M2
GLUCOSE SERPL-MCNC: 100 MG/DL (ref 70–99)
GLUCOSE UR STRIP-MCNC: NEGATIVE MG/DL
HCT VFR BLD AUTO: 33.5 % (ref 35–47)
HGB BLD-MCNC: 11.1 G/DL (ref 11.7–15.7)
HGB UR QL STRIP: ABNORMAL
IMM GRANULOCYTES # BLD: 0 10E9/L (ref 0–0.4)
IMM GRANULOCYTES NFR BLD: 0.1 %
INR PPP: 3.26 (ref 0.86–1.14)
INTERPRETATION ECG - MUSE: NORMAL
KETONES UR STRIP-MCNC: NEGATIVE MG/DL
LACTATE BLD-SCNC: 1.2 MMOL/L (ref 0.7–2)
LEUKOCYTE ESTERASE UR QL STRIP: ABNORMAL
LYMPHOCYTES # BLD AUTO: 1.3 10E9/L (ref 0.8–5.3)
LYMPHOCYTES NFR BLD AUTO: 14.9 %
MCH RBC QN AUTO: 31.9 PG (ref 26.5–33)
MCHC RBC AUTO-ENTMCNC: 33.1 G/DL (ref 31.5–36.5)
MCV RBC AUTO: 96 FL (ref 78–100)
MONOCYTES # BLD AUTO: 1 10E9/L (ref 0–1.3)
MONOCYTES NFR BLD AUTO: 10.9 %
MUCOUS THREADS #/AREA URNS LPF: PRESENT /LPF
NEUTROPHILS # BLD AUTO: 6.5 10E9/L (ref 1.6–8.3)
NEUTROPHILS NFR BLD AUTO: 73.6 %
NITRATE UR QL: NEGATIVE
NRBC # BLD AUTO: 0 10*3/UL
NRBC BLD AUTO-RTO: 0 /100
PH UR STRIP: 5.5 PH (ref 5–7)
PLATELET # BLD AUTO: 134 10E9/L (ref 150–450)
POTASSIUM SERPL-SCNC: 4.4 MMOL/L (ref 3.4–5.3)
PROT SERPL-MCNC: 6.3 G/DL (ref 6.8–8.8)
RBC # BLD AUTO: 3.48 10E12/L (ref 3.8–5.2)
RBC #/AREA URNS AUTO: 3 /HPF (ref 0–2)
SODIUM SERPL-SCNC: 133 MMOL/L (ref 133–144)
SOURCE: ABNORMAL
SP GR UR STRIP: 1.01 (ref 1–1.03)
SQUAMOUS #/AREA URNS AUTO: 4 /HPF (ref 0–1)
TRANS CELLS #/AREA URNS HPF: <1 /HPF (ref 0–1)
UROBILINOGEN UR STRIP-MCNC: NORMAL MG/DL (ref 0–2)
WBC # BLD AUTO: 8.9 10E9/L (ref 4–11)
WBC #/AREA URNS AUTO: 10 /HPF (ref 0–2)

## 2018-01-01 PROCEDURE — 99285 EMERGENCY DEPT VISIT HI MDM: CPT | Mod: 25 | Performed by: EMERGENCY MEDICINE

## 2018-01-01 PROCEDURE — 96374 THER/PROPH/DIAG INJ IV PUSH: CPT | Performed by: EMERGENCY MEDICINE

## 2018-01-01 PROCEDURE — 80053 COMPREHEN METABOLIC PANEL: CPT | Performed by: EMERGENCY MEDICINE

## 2018-01-01 PROCEDURE — 87086 URINE CULTURE/COLONY COUNT: CPT | Performed by: EMERGENCY MEDICINE

## 2018-01-01 PROCEDURE — 99284 EMERGENCY DEPT VISIT MOD MDM: CPT | Mod: Z6 | Performed by: EMERGENCY MEDICINE

## 2018-01-01 PROCEDURE — 25000125 ZZHC RX 250: Performed by: EMERGENCY MEDICINE

## 2018-01-01 PROCEDURE — 71046 X-RAY EXAM CHEST 2 VIEWS: CPT

## 2018-01-01 PROCEDURE — 94640 AIRWAY INHALATION TREATMENT: CPT | Performed by: EMERGENCY MEDICINE

## 2018-01-01 PROCEDURE — 85610 PROTHROMBIN TIME: CPT | Performed by: EMERGENCY MEDICINE

## 2018-01-01 PROCEDURE — 81001 URINALYSIS AUTO W/SCOPE: CPT | Performed by: EMERGENCY MEDICINE

## 2018-01-01 PROCEDURE — 83605 ASSAY OF LACTIC ACID: CPT | Performed by: EMERGENCY MEDICINE

## 2018-01-01 PROCEDURE — 25000128 H RX IP 250 OP 636: Performed by: EMERGENCY MEDICINE

## 2018-01-01 PROCEDURE — 85025 COMPLETE CBC W/AUTO DIFF WBC: CPT | Performed by: EMERGENCY MEDICINE

## 2018-01-01 PROCEDURE — 85730 THROMBOPLASTIN TIME PARTIAL: CPT | Performed by: EMERGENCY MEDICINE

## 2018-01-01 RX ORDER — DEXAMETHASONE SODIUM PHOSPHATE 10 MG/ML
10 INJECTION, SOLUTION INTRAMUSCULAR; INTRAVENOUS ONCE
Status: COMPLETED | OUTPATIENT
Start: 2018-01-01 | End: 2018-01-01

## 2018-01-01 RX ORDER — IPRATROPIUM BROMIDE AND ALBUTEROL SULFATE 2.5; .5 MG/3ML; MG/3ML
3 SOLUTION RESPIRATORY (INHALATION) ONCE
Status: COMPLETED | OUTPATIENT
Start: 2018-01-01 | End: 2018-01-01

## 2018-01-01 RX ORDER — CEFDINIR 300 MG/1
300 CAPSULE ORAL 2 TIMES DAILY
Qty: 14 CAPSULE | Refills: 0 | Status: SHIPPED | OUTPATIENT
Start: 2018-01-01 | End: 2018-01-01

## 2018-01-01 RX ADMIN — IPRATROPIUM BROMIDE AND ALBUTEROL SULFATE 3 ML: .5; 3 SOLUTION RESPIRATORY (INHALATION) at 02:27

## 2018-01-01 RX ADMIN — DEXAMETHASONE SODIUM PHOSPHATE 10 MG: 10 INJECTION, SOLUTION INTRAMUSCULAR; INTRAVENOUS at 02:28

## 2018-01-01 RX ADMIN — IPRATROPIUM BROMIDE AND ALBUTEROL SULFATE 3 ML: .5; 3 SOLUTION RESPIRATORY (INHALATION) at 01:30

## 2018-01-01 ASSESSMENT — ENCOUNTER SYMPTOMS
DIARRHEA: 0
WHEEZING: 1
ABDOMINAL PAIN: 0
FLANK PAIN: 0
CHILLS: 0
WEAKNESS: 0
SHORTNESS OF BREATH: 1
COUGH: 1
HEADACHES: 0
DYSURIA: 0
BACK PAIN: 0
APPETITE CHANGE: 0
FATIGUE: 1
NAUSEA: 0
PALPITATIONS: 0
FEVER: 0
TREMORS: 0

## 2018-01-01 NOTE — ED AVS SNAPSHOT
[facility], [place], Emergency Department    [address] [place] [id number]    Phone:  [phone]                                       Brie Gonzalez   MRN: [id number]    Department:  [facility], Emergency Department   Date of Visit:  1/1/2018           After Visit Summary Signature Page     I have received my discharge instructions, and my questions have been answered. I have discussed any challenges I see with this plan with the nurse or doctor.    ..........................................................................................................................................  Patient/Patient Representative Signature      ..........................................................................................................................................  Patient Representative Print Name and Relationship to Patient    ..................................................               ................................................  Date                                            Time    ..........................................................................................................................................  Reviewed by Signature/Title    ...................................................              ..............................................  Date                                                            Time

## 2018-01-01 NOTE — ED NOTES
Bed: ED14  Expected date: 1/1/18  Expected time: 12:38 AM  Means of arrival: Ambulance  Comments:  SP #14 with a 89 yo female c/o shortness of breath and wheezing. Temp 101.5 F at home. DuoNeb given by medics. ETA 5 mins

## 2018-01-01 NOTE — DISCHARGE INSTRUCTIONS

## 2018-01-01 NOTE — ED AVS SNAPSHOT
[facility], Emergency Department    [address] [facility] [address]    Phone:  [phone]                                       Brie Gonzalez   MRN: [id number]    Department:  [facility], Emergency Department   Date of Visit:  1/1/2018           Patient Information     Date Of Birth          7/8/1925        Your diagnoses for this visit were:     SOB (shortness of breath)     Acute bronchitis due to respiratory syncytial virus (RSV)     Acute cystitis without hematuria        You were seen by Juan King MD.      Follow-up Information     Follow up with Philip Pederson In 1 day.    Specialty:  Physician Assistant    Contact information:    [facility]  [address]  [facility] [id number] [phone]          Follow up with [facility], Emergency Department.    Specialty:  EMERGENCY MEDICINE    Why:  As needed, If symptoms worsen    Contact information:    [address] [place] [id number] [phone]    Additional information:    The [facility] is located on the corner of [facility] and [facility] on the [facility]. It is easily accessible from virtually any point in the [facility] area, via [employer] and [employer].        Discharge Instructions         What Is Acute Bronchitis?  Acute bronchitis is when the airways in your lungs (bronchial tubes) become red and swollen (inflamed). It is usually caused by a viral infection. But it can also occur because of a bacteria or allergen. Symptoms include a cough that produces yellow or greenish mucus and can last for days or sometimes weeks.  Inside healthy lungs    Air travels in and out of the lungs through the airways. The linings of these airways produce sticky mucus. This mucus traps particles that enter the lungs. Tiny structures called cilia then sweep the particles out of the airways.     Healthy airway: Airways are normally open. Air moves in and out easily.      Healthy  cilia: Tiny, hairlike cilia sweep mucus and particles up and out of the airways.   Lungs with bronchitis  Bronchitis often occurs with a cold or the flu virus. The airways become inflamed (red and swollen). There is a deep hacking cough from the extra mucus. Other symptoms may include:    Wheezing    Chest discomfort    Shortness of breath    Mild fever  A second infection, this time due to bacteria, may then occur. And airways irritated by allergens or smoke are more likely to get infected.        Inflamed airway: Inflammation and extra mucus narrow the airway, causing shortness of breath.      Impaired cilia: Extra mucus impairs cilia, causing congestion and wheezing. Smoking makes the problem worse.   Making a diagnosis  A physical exam, health history, and certain tests help your healthcare provider make the diagnosis.  Health history  Your healthcare provider will ask you about your symptoms.  The exam  Your provider listens to your chest for signs of congestion. He or she may also check your ears, nose, and throat.  Possible tests    A sputum test for bacteria. This requires a sample of mucus from your lungs.    A nasal or throat swab. This tests to see if you have a bacterial infection.    A chest X-ray. This is done if your healthcare provider thinks you have pneumonia.    Tests to check for an underlying condition. Other tests may be done to check for things such as allergies, asthma, or COPD (chronic obstructive pulmonary disease). You may need to see a specialist for more lung function testing.  Treating a cough  The main treatment for bronchitis is easing symptoms. Avoiding smoke, allergens, and other things that trigger coughing can often help. If the infection is bacterial, you may be given antibiotics. During the illness, it's important to get plenty of sleep. To ease symptoms:    Don t smoke. Also avoid secondhand smoke.    Use a humidifier. Or try breathing in steam from a hot shower. This may help  loosen mucus.    Drink a lot of water and juice. They can soothe the throat and may help thin mucus.    Sit up or use extra pillows when in bed. This helps to lessen coughing and congestion.    Ask your provider about using medicine. Ask about using cough medicine, pain and fever medicine, or a decongestant.  Antibiotics  Most cases of bronchitis are caused by cold or flu viruses. They don t need antibiotics to treat them, even if your mucus is thick and green or yellow. Antibiotics don t treat viral illness and antibiotics have not been shown to have any benefit in cases of acute bronchitis. Taking antibiotics when they are not needed increases your risk of getting an infection later that is antibiotic-resistant. Antibiotics can also cause severe cases of diarrhea that require other antibiotics to treat.  It is important that you accept your healthcare provider's opinion to not use antibiotics. Your provider will prescribe antibiotics if the infection is caused by bacteria. If they are prescribed:    Take all of the medicine. Take the medicine until it is used up, even if symptoms have improved. If you don t, the bronchitis may come back.    Take the medicines as directed. For instance, some medicines should be taken with food.    Ask about side effects. Ask your provider or pharmacist what side effects are common, and what to do about them.  Follow-up care  You should see your provider again in 2 to 3 weeks. By this time, symptoms should have improved. An infection that lasts longer may mean you have a more serious problem.  Prevention    Avoid tobacco smoke. If you smoke, quit. Stay away from smoky places. Ask friends and family not to smoke around you, or in your home or car.    Get checked for allergies.    Ask your provider about getting a yearly flu shot. Also ask about pneumococcal or pneumonia shots.    Wash your hands often. This helps reduce the chance of picking up viruses that cause colds and flu.  Call  your healthcare provider if:    Symptoms worsen, or you have new symptoms    Breathing problems worsen or  become severe    Symptoms don t get better within a week, or within 3 days of taking antibiotics   Date Last Reviewed: 2/1/2017 2000-2017 [employer]. [address] [place] [address], [place] [address]. All rights reserved. This information is not intended as a substitute for professional medical care. Always follow your healthcare professional's instructions.          24 Hour Appointment Hotline       To make an appointment at any [facility], call [phone] ([phone]). If you don't have a family doctor or clinic, we will help you find one. [facility] are conveniently located to serve the needs of you and your family.             Review of your medicines      Our records show that you are taking the medicines listed below. If these are incorrect, please call your family doctor or clinic.        Dose / Directions Last dose taken    ACETAMINOPHEN PO   Dose:  325 mg        Take 325 mg by mouth every 4 hours as needed for pain   Refills:  0        albuterol 108 (90 BASE) MCG/ACT Inhaler   Commonly known as:  PROAIR HFA/PROVENTIL HFA/VENTOLIN HFA   Dose:  2 puff        Inhale 2 puffs into the lungs every 4 hours as needed for shortness of breath / dyspnea or wheezing   Refills:  0        ASPIRIN PO   Dose:  81 mg        Take 81 mg by mouth every other day   Refills:  0        azithromycin 250 MG tablet   Commonly known as:  ZITHROMAX        Take 2 tablets by mouth on day 1, then 1 tablet by mouth daily for 4 days   Refills:  0        calcium-vitamin D 600-400 MG-UNIT per tablet   Commonly known as:  CALTRATE   Dose:  1 tablet        Take 1 tablet by mouth daily   Refills:  0        COUMADIN PO   Dose:  2.5 mg        Take 2.5 mg by mouth daily   Refills:  0        LORAZEPAM PO   Dose:  0.5 mg        Take 0.5 mg by mouth nightly as needed for anxiety   Refills:  0        metoprolol 25 MG  tablet   Commonly known as:  LOPRESSOR   Dose:  50 mg   Quantity:  10 tablet        Take 2 tablets (50 mg) by mouth daily   Refills:  0        oxymetazoline 0.05 % spray   Commonly known as:  AFRIN   Dose:  2 spray        Spray 2 sprays into both nostrils 2 times daily as needed for congestion   Refills:  0        PAROXETINE HCL PO   Dose:  10 mg        Take 10 mg by mouth daily   Refills:  0        SIMVASTATIN PO   Dose:  20 mg        Take 20 mg by mouth At Bedtime   Refills:  0        sodium chloride 0.65 % nasal spray   Commonly known as:  OCEAN   Dose:  1 spray   Quantity:  1 Bottle        Spray 1 spray into both nostrils every 2 hours   Refills:  0        VITAMIN D (CHOLECALCIFEROL) PO   Dose:  1000 Units        Take 1,000 Units by mouth daily   Refills:  0                Procedures and tests performed during your visit     CBC with platelets differential    Comprehensive metabolic panel    ED Bed Request    EKG 12-lead, tracing only    INR    Lactic acid whole blood    Partial thromboplastin time    UA reflex to Microscopic and Culture    Urine Culture Aerobic Bacterial    XR Chest 2 Views      Orders Needing Specimen Collection     None      Pending Results     Date and Time Order Name Status Description    1/1/2018 0209 Urine Culture Aerobic Bacterial In process     1/1/2018 0052 XR Chest 2 Views Preliminary     1/1/2018 0052 EKG 12-lead, tracing only Preliminary             Pending Culture Results     Date and Time Order Name Status Description    1/1/2018 0209 Urine Culture Aerobic Bacterial In process             Pending Results Instructions     If you had any lab results that were not finalized at the time of your Discharge, you can call the ED Lab Result RN at [phone]. You will be contacted by this team for any positive Lab results or changes in treatment. The nurses are available 7 days a week from 10A to 6:30P.  You can leave a message 24 hours per day and they will return your call.        Thank  you for choosing [facility]       Thank you for choosing [facility] for your care. Our goal is always to provide you with excellent care. Hearing back from our patients is one way we can continue to improve our services. Please take a few minutes to complete the written survey that you may receive in the mail after you visit with us. Thank you!        Care EveryWhere ID     This is your Care EveryWhere ID. This could be used by other organizations to access your [facility] medical records  [id number]        Equal Access to Services     MOHIT REMY : Hadii alex rodriguezo Soconrado, waaxda luqadaha, qaybta kaalmada giancarlo, ana lebron . So [facility] [phone].    ATENCIÓN: Si habla español, tiene a posada disposición servicios gratuitos de asistencia lingüística. Llame al [phone].    We comply with applicable federal civil rights laws and [place] laws. We do not discriminate on the basis of race, color, national origin, age, disability, sex, sexual orientation, or gender identity.            After Visit Summary       This is your record. Keep this with you and show to your community pharmacist(s) and doctor(s) at your next visit.

## 2018-01-01 NOTE — ED NOTES
Pt. BIBA from [facility] with increased SOB, cough, chest pain and fever. EMS placed patient on O2 with duoneb, fluids running, AVSS in route. Upin arrival pt. A & O x 4, independent from stretcher to bed, and AVSS on RA, afebrile.

## 2018-01-01 NOTE — ED PROVIDER NOTES
"  History     Chief Complaint   Patient presents with     Fever     Shortness of Breath     HPI  89-year-old female recently dismissed from the hospital with a diagnosis of RSV bronchitis.  She has a past medical history significant for hypertension, atrial fibrillation on warfarin, and TIA.  The patient states she was dismissed yesterday from the hospital and now returns with progressive increasing generalized weakness.  She states at baseline she ambulates unassisted but now has having to use a cane due to generalized weakness.  Over the course of the day she reports increased wheezing and shortness of breath where she felt as if she could not catch her breath.  She attempted to go to bed early tonight she is feeling quite weak she awoke with worsening shortness of breath.  She does state she has intermittently taken the albuterol inhaler with mild improvement.  She reports subjective fever without generalized shaking chills.  She denies other new symptoms such as abdominal pain, nausea, vomiting, diarrhea.  She reports no headache or neck stiffness.  The patient denies rash.  She otherwise denies new medication.    I have reviewed the Medications, Allergies, Past Medical and Surgical History, and Social History in the Epic system.    Review of Systems   Constitutional: Positive for fatigue. Negative for appetite change, chills and fever.   Respiratory: Positive for cough, shortness of breath and wheezing.    Cardiovascular: Negative for chest pain and palpitations.   Gastrointestinal: Negative for abdominal pain, diarrhea and nausea.   Genitourinary: Negative for dysuria and flank pain.   Musculoskeletal: Negative for back pain.   Neurological: Negative for tremors, weakness and headaches.       Physical Exam   BP: (!) 125/92  Heart Rate: 90  Temp: 98.8  F (37.1  C)  Height: 165.1 cm (5' 5\")  Weight: 60.3 kg (132 lb 15 oz)  SpO2: 94 %      Physical Exam   Constitutional: She is oriented to person, place, and " time. She appears well-developed and well-nourished. No distress.   HENT:   Right Ear: External ear normal.   Left Ear: External ear normal.   Mouth/Throat: Oropharynx is clear and moist.   Eyes: Conjunctivae are normal. Pupils are equal, round, and reactive to light.   Cardiovascular: Normal rate and intact distal pulses.  An irregularly irregular rhythm present.   Pulmonary/Chest: No accessory muscle usage or stridor. No respiratory distress. She has wheezes.   Abdominal: Soft. She exhibits no distension. There is no tenderness. There is no rebound.   Neurological: She is alert and oriented to person, place, and time. She has normal strength. No cranial nerve deficit or sensory deficit. GCS eye subscore is 4. GCS verbal subscore is 5. GCS motor subscore is 6.   Skin: Skin is warm and dry. No rash noted.   Psychiatric: She has a normal mood and affect. Her behavior is normal.       ED Course     ED Course     Procedures               Labs Ordered and Resulted from Time of ED Arrival Up to the Time of Departure from the ED   CBC WITH PLATELETS DIFFERENTIAL - Abnormal; Notable for the following:        Result Value    RBC Count 3.48 (*)     Hemoglobin 11.1 (*)     Hematocrit 33.5 (*)     Platelet Count 134 (*)     All other components within normal limits   INR - Abnormal; Notable for the following:     INR 3.26 (*)     All other components within normal limits   PARTIAL THROMBOPLASTIN TIME - Abnormal; Notable for the following:     PTT 51 (*)     All other components within normal limits   COMPREHENSIVE METABOLIC PANEL   LACTIC ACID WHOLE BLOOD   UA MACROSCOPIC WITH REFLEX TO MICRO AND CULTURE            Assessments & Plan (with Medical Decision Making)     89-year-old female dismissed yesterday from the hospital with diagnosis of RSV bronchitis returning with worsening of symptoms.  Upon arrival to the emergency department the patient is currently noted to be alert she is afebrile on presentation and appears  somewhat uncomfortable but nontoxic.  By auscultation she does have diffuse end expiratory wheezing with subtle subcostal retractions.  Patient has mild tachypnea and I do believe she benefit from repeat nebulized medication.  On review of her history she is a non-smoker with no formal diagnosis of COPD or emphysema and I would hold on steroid therapy at this time.  Based on age and initial streaky opacities by chest x-ray I would repeat to evaluate for potential consolidation however this patient has been on azithromycin which presumably would cover appropriate community-acquired bacterial infections.  With increasing weakness and persistent shortness of breath I suspect this patient would benefit to be back in the hospital for 1-2 nights for continued supportive treatment for RSV bronchitis.    Repeat chest x-ray demonstrates a streaky perihilar opacities with question of atelectasis versus infection or pulmonary edema.  Laboratory studies demonstrate no obvious leukocytosis, anemia, or electrolyte disturbance.  I did discuss the patient slightly supratherapeutic INR as well as management plan at home.  Following DuoNeb the patient reports significant improvement in breathing.  She is ambulated unassisted in the emergency department without significant hypoxia or tachycardia.  I did recommend and offer rehospitalization for symptomatic treatment.  The patient is adamant that she wants to go home; she has capacity to make this decision..  At this time she is not requiring oxygen or IV fluids.  Chest x-ray appears stable.  Additionally, urinalysis was obtained which reveals large leuk esterase.  Nitrates are negative and I discussed with the patient's treatment versus follow-up on urine culture.  She prefer to hold on antibiotics at this time.  Presently she is asymptomatic and I think this is reasonable.  Should urinalysis return is positive I would initiate antibiotics.  I discussed symptoms for which the patient  should return emergently such as increasing shortness of breath, weakness, high fevers, confusion or other concern.  I did discuss regular use of home inhaler which the patient verbalizes understanding of.    I have reviewed the nursing notes.    I have reviewed the findings, diagnosis, plan and need for follow up with the patient.    New Prescriptions    No medications on file       Final diagnoses:   SOB (shortness of breath)   Acute bronchitis due to respiratory syncytial virus (RSV)   Acute cystitis without hematuria       1/1/2018   [facility], [place], EMERGENCY DEPARTMENT     Juan King MD  01/01/18 0323

## 2018-01-02 LAB
BACTERIA SPEC CULT: NORMAL
Lab: NORMAL
SPECIMEN SOURCE: NORMAL

## 2018-02-24 ENCOUNTER — APPOINTMENT (OUTPATIENT)
Dept: GENERAL RADIOLOGY | Facility: CLINIC | Age: 83
End: 2018-02-24
Attending: EMERGENCY MEDICINE
Payer: COMMERCIAL

## 2018-02-24 ENCOUNTER — HOSPITAL ENCOUNTER (EMERGENCY)
Facility: CLINIC | Age: 83
Discharge: HOME | End: 2018-02-24
Attending: EMERGENCY MEDICINE | Admitting: EMERGENCY MEDICINE
Payer: COMMERCIAL

## 2018-02-24 VITALS
HEIGHT: 65 IN | HEART RATE: 62 BPM | OXYGEN SATURATION: 93 % | BODY MASS INDEX: 22.49 KG/M2 | DIASTOLIC BLOOD PRESSURE: 81 MMHG | TEMPERATURE: 97.5 F | SYSTOLIC BLOOD PRESSURE: 170 MMHG | WEIGHT: 135 LBS | RESPIRATION RATE: 24 BRPM

## 2018-02-24 DIAGNOSIS — J90 PLEURAL EFFUSION: ICD-10-CM

## 2018-02-24 DIAGNOSIS — R60.0 PERIPHERAL EDEMA: ICD-10-CM

## 2018-02-24 DIAGNOSIS — R00.1 SEVERE SINUS BRADYCARDIA: ICD-10-CM

## 2018-02-24 DIAGNOSIS — I48.91 ATRIAL FIBRILLATION (H): ICD-10-CM

## 2018-02-24 LAB
ANION GAP SERPL CALCULATED.3IONS-SCNC: 7 MMOL/L (ref 3–14)
BASOPHILS # BLD AUTO: 0 10E9/L (ref 0–0.2)
BASOPHILS NFR BLD AUTO: 0.6 %
BUN SERPL-MCNC: 14 MG/DL (ref 7–30)
CALCIUM SERPL-MCNC: 8.6 MG/DL (ref 8.5–10.1)
CHLORIDE SERPL-SCNC: 107 MMOL/L (ref 94–109)
CO2 SERPL-SCNC: 28 MMOL/L (ref 20–32)
CREAT SERPL-MCNC: 0.79 MG/DL (ref 0.52–1.04)
DIFFERENTIAL METHOD BLD: ABNORMAL
EOSINOPHIL # BLD AUTO: 0.1 10E9/L (ref 0–0.7)
EOSINOPHIL NFR BLD AUTO: 1.5 %
ERYTHROCYTE [DISTWIDTH] IN BLOOD BY AUTOMATED COUNT: 15.5 % (ref 10–15)
GFR SERPL CREATININE-BSD FRML MDRD: 68 ML/MIN/1.7M2
GLUCOSE SERPL-MCNC: 149 MG/DL (ref 70–99)
HCT VFR BLD AUTO: 36 % (ref 35–47)
HGB BLD-MCNC: 11.7 G/DL (ref 11.7–15.7)
IMM GRANULOCYTES # BLD: 0 10E9/L (ref 0–0.4)
IMM GRANULOCYTES NFR BLD: 0.2 %
INR PPP: 1.32 (ref 0.86–1.14)
LYMPHOCYTES # BLD AUTO: 1.2 10E9/L (ref 0.8–5.3)
LYMPHOCYTES NFR BLD AUTO: 22.9 %
MCH RBC QN AUTO: 32.1 PG (ref 26.5–33)
MCHC RBC AUTO-ENTMCNC: 32.5 G/DL (ref 31.5–36.5)
MCV RBC AUTO: 99 FL (ref 78–100)
MONOCYTES # BLD AUTO: 0.7 10E9/L (ref 0–1.3)
MONOCYTES NFR BLD AUTO: 12.5 %
NEUTROPHILS # BLD AUTO: 3.2 10E9/L (ref 1.6–8.3)
NEUTROPHILS NFR BLD AUTO: 62.3 %
NRBC # BLD AUTO: 0 10*3/UL
NRBC BLD AUTO-RTO: 0 /100
NT-PROBNP SERPL-MCNC: 1950 PG/ML (ref 0–1800)
PLATELET # BLD AUTO: 186 10E9/L (ref 150–450)
POTASSIUM SERPL-SCNC: 4.1 MMOL/L (ref 3.4–5.3)
RBC # BLD AUTO: 3.64 10E12/L (ref 3.8–5.2)
SODIUM SERPL-SCNC: 142 MMOL/L (ref 133–144)
WBC # BLD AUTO: 5.2 10E9/L (ref 4–11)

## 2018-02-24 PROCEDURE — 71046 X-RAY EXAM CHEST 2 VIEWS: CPT

## 2018-02-24 PROCEDURE — 93010 ELECTROCARDIOGRAM REPORT: CPT | Mod: Z6 | Performed by: EMERGENCY MEDICINE

## 2018-02-24 PROCEDURE — 85610 PROTHROMBIN TIME: CPT | Performed by: EMERGENCY MEDICINE

## 2018-02-24 PROCEDURE — 99285 EMERGENCY DEPT VISIT HI MDM: CPT | Mod: 25

## 2018-02-24 PROCEDURE — 83880 ASSAY OF NATRIURETIC PEPTIDE: CPT | Performed by: EMERGENCY MEDICINE

## 2018-02-24 PROCEDURE — 80048 BASIC METABOLIC PNL TOTAL CA: CPT | Performed by: EMERGENCY MEDICINE

## 2018-02-24 PROCEDURE — 99285 EMERGENCY DEPT VISIT HI MDM: CPT | Mod: 25 | Performed by: EMERGENCY MEDICINE

## 2018-02-24 PROCEDURE — 93005 ELECTROCARDIOGRAM TRACING: CPT

## 2018-02-24 PROCEDURE — 85025 COMPLETE CBC W/AUTO DIFF WBC: CPT | Performed by: EMERGENCY MEDICINE

## 2018-02-24 RX ORDER — FUROSEMIDE 20 MG
20 TABLET ORAL DAILY
Qty: 7 TABLET | Refills: 1 | Status: ON HOLD | OUTPATIENT
Start: 2018-02-24 | End: 2018-05-15

## 2018-02-24 ASSESSMENT — ENCOUNTER SYMPTOMS
FEVER: 0
ABDOMINAL PAIN: 0
CHILLS: 0
DYSURIA: 0
ACTIVITY CHANGE: 0
DIARRHEA: 0
VOMITING: 0
MUSCULOSKELETAL NEGATIVE: 1
NAUSEA: 0
COUGH: 0
SHORTNESS OF BREATH: 1

## 2018-02-24 NOTE — ED AVS SNAPSHOT
[facility], Emergency Department    [address] [address]    Phone:  [phone]                                       Brie Gonzalez   MRN: [id number]    Department:  [facility], Emergency Department   Date of Visit:  2/24/2018           Patient Information     Date Of Birth          7/8/1925        Your diagnoses for this visit were:     Peripheral edema     Pleural effusion        You were seen by Mariana De La Torre MD.        Discharge Instructions       Thank you for coming to the [facility] Emergency Department.     Start the diuretic, lasix 20mg, daily for the next week. Kidney function should be rechecked in the next week.     Keep the legs elevated when possible.     We are working to arrange a follow up clinic appointment with Cardiology in the next week to check in your how your breathing and leg swelling is improving.     Echocardiogram today is unchanged from usual.     NT-BNP 1900. Creatinine and electrolytes normal.       24 Hour Appointment Hotline       To make an appointment at any [facility] clinic, call [phone] ([phone]). If you don't have a family doctor or clinic, we will help you find one. [facility] clinics are conveniently located to serve the needs of you and your family.          ED Discharge Orders     CARDIO  ADULT REFERRAL       [facility] is referring you to Cardiology Services.       The  Representative will assist you in the coordination of your Cardiology care as prescribed by your physician.    The  Representative will call you within 24 hours to help schedule your appointment, or you may contact the  Representative at: [phone].         Type of Referral: Cardiology Follow Up            Timeframe requested: 3-5 days       Coverage of these services is subject to the terms and limitations of your health insurance plan.  Please call member services at your health  plan with any benefit or coverage questions.      If X-rays, CT or MRI's have been performed, please contact the facility where they were done to arrange for , prior to your scheduled appointment.  Please bring this referral request to your appointment and present it to your specialist.                     Review of your medicines      START taking        Dose / Directions Last dose taken    furosemide 20 MG tablet   Commonly known as:  LASIX   Dose:  20 mg   Quantity:  7 tablet        Take 1 tablet (20 mg) by mouth daily for 7 days   Refills:  1          Our records show that you are taking the medicines listed below. If these are incorrect, please call your family doctor or clinic.        Dose / Directions Last dose taken    ACETAMINOPHEN PO   Dose:  325 mg        Take 325 mg by mouth every 4 hours as needed for pain   Refills:  0        albuterol 108 (90 BASE) MCG/ACT Inhaler   Commonly known as:  PROAIR HFA/PROVENTIL HFA/VENTOLIN HFA   Dose:  2 puff        Inhale 2 puffs into the lungs every 4 hours as needed for shortness of breath / dyspnea or wheezing   Refills:  0        ASPIRIN PO   Dose:  81 mg        Take 81 mg by mouth every other day   Refills:  0        azithromycin 250 MG tablet   Commonly known as:  ZITHROMAX        Take 2 tablets by mouth on day 1, then 1 tablet by mouth daily for 4 days   Refills:  0        calcium-vitamin D 600-400 MG-UNIT per tablet   Commonly known as:  CALTRATE   Dose:  1 tablet        Take 1 tablet by mouth daily   Refills:  0        COUMADIN PO   Dose:  2.5 mg        Take 2.5 mg by mouth daily   Refills:  0        LORAZEPAM PO   Dose:  0.5 mg        Take 0.5 mg by mouth nightly as needed for anxiety   Refills:  0        metoprolol tartrate 25 MG tablet   Commonly known as:  LOPRESSOR   Dose:  50 mg   Quantity:  10 tablet        Take 2 tablets (50 mg) by mouth daily   Refills:  0        oxymetazoline 0.05 % spray   Commonly known as:  AFRIN   Dose:  2 spray        Spray  2 sprays into both nostrils 2 times daily as needed for congestion   Refills:  0        PAROXETINE HCL PO   Dose:  10 mg        Take 10 mg by mouth daily   Refills:  0        SIMVASTATIN PO   Dose:  20 mg        Take 20 mg by mouth At Bedtime   Refills:  0        sodium chloride 0.65 % nasal spray   Commonly known as:  OCEAN   Dose:  1 spray   Quantity:  1 Bottle        Spray 1 spray into both nostrils every 2 hours   Refills:  0        VITAMIN D (CHOLECALCIFEROL) PO   Dose:  1000 Units        Take 1,000 Units by mouth daily   Refills:  0                Prescriptions were sent or printed at these locations (1 Prescription)                   Other Prescriptions                Printed at Department/Unit printer (1 of 1)         furosemide (LASIX) 20 MG tablet                Procedures and tests performed during your visit     BNP    Basic metabolic panel    CBC with platelets differential    Chest XR,  PA & LAT    EKG 12 lead    INR    Peripheral IV catheter      Orders Needing Specimen Collection     None      Pending Results     Date and Time Order Name Status Description    2/24/2018 1240 EKG 12 lead Preliminary             Pending Culture Results     No orders found from 2/22/2018 to 2/25/2018.            Pending Results Instructions     If you had any lab results that were not finalized at the time of your Discharge, you can call the ED Lab Result RN at [phone]. You will be contacted by this team for any positive Lab results or changes in treatment. The nurses are available 7 days a week from 10A to 6:30P.  You can leave a message 24 hours per day and they will return your call.        Thank you for choosing [facility]       Thank you for choosing [facility] for your care. Our goal is always to provide you with excellent care. Hearing back from our patients is one way we can continue to improve our services. Please take a few minutes to complete the written survey that you may receive in the mail after you visit  "with us. Thank you!        Apps4Pro Information     Apps[employer]Pro lets you send messages to your doctor, view your test results, renew your prescriptions, schedule appointments and more. To sign up, go to [email or web address] . Click on \"Log in\" on the left side of the screen, which will take you to the Welcome page. Then click on \"Sign up Now\" on the right side of the page.     You will be asked to enter the access code listed below, as well as some personal information. Please follow the directions to create your username and password.     Your access code is: [email or web address]  Expires: 2018  4:52 PM     Your access code will  in 90 days. If you need help or a new code, please call your [facility] clinic or [phone].        Care EveryWhere ID     This is your Care EveryWhere ID. This could be used by other organizations to access your [facility] medical records  [id number]        Equal Access to Services     [facility] : Hadii alex asm hadasho Soluisali, waaxda luqadaha, qaybta kaalmada adeegyakarissa, ana lebron . So [facility] [phone].    ATENCIÓN: Si habla español, tiene a posada disposición servicios gratuitos de asistencia lingüística. Llame al [phone].    We comply with applicable federal civil rights laws and [place] laws. We do not discriminate on the basis of race, color, national origin, age, disability, sex, sexual orientation, or gender identity.            After Visit Summary       This is your record. Keep this with you and show to your community pharmacist(s) and doctor(s) at your next visit.                  "

## 2018-02-24 NOTE — ED PROVIDER NOTES
History     Chief Complaint   Patient presents with     Shortness of Breath     HPI  Brie Gonzalez is a 89 year old female with a history of fibrillation, cerebral infarction, and hypertension who presents to the Emergency Department via EMS for evaluation of shortness of breath.  Patient reports 1 week of waking in the middle of the night with shortness of breath, worse in the last 3 days.  She has also noticed shortness of breath when she is taking the stairs in her assisted living home for the past 2 days.  Patient recently moved to her assisted living home in December 2017.  She has had swelling and soreness in her feet and ankles for the past 2 days.  She has tried using compression socks with no improvement in his tried elevating her legs with improvement.  She denies chest pain. She notes that she has been sleeping in a bed at night with her head propped up for the past 2 months.  Her Coumadin was recently discontinued (about 10 days ago) and she was started on Xarelto.  Denies any other known medical dictation changes.    Past Medical History:   Diagnosis Date     Anxiety 2.5 years ago     Arthritis Hands for years     Atrial fibrillation (H)      Cerebral infarction (H) 4-5 years ago     Depressive disorder 2.5 years ago intermittent     Hypertension 4-5 years ago       Past Surgical History:   Procedure Laterality Date     ADENOIDECTOMY  1930's     GYN SURGERY   1970's uterine fibroids     TONSILLECTOMY  1930's       Family History   Problem Relation Age of Onset     Dementia Mother      CEREBROVASCULAR DISEASE Mother        Social History   Substance Use Topics     Smoking status: Never Smoker     Smokeless tobacco: Never Used     Alcohol use Yes      Comment: Rare       No current facility-administered medications for this encounter.      Current Outpatient Prescriptions   Medication     furosemide (LASIX) 20 MG tablet     LORAZEPAM PO     azithromycin (ZITHROMAX) 250 MG tablet     albuterol (PROAIR  "HFA/PROVENTIL HFA/VENTOLIN HFA) 108 (90 BASE) MCG/ACT Inhaler     sodium chloride (OCEAN) 0.65 % nasal spray     metoprolol (LOPRESSOR) 25 MG tablet     ACETAMINOPHEN PO     calcium-vitamin D (CALTRATE) 600-400 MG-UNIT per tablet     VITAMIN D, CHOLECALCIFEROL, PO     oxymetazoline (AFRIN) 0.05 % spray     SIMVASTATIN PO     PAROXETINE HCL PO     Warfarin Sodium (COUMADIN PO)     ASPIRIN PO        Allergies   Allergen Reactions     Penicillins Swelling         I have reviewed the Medications, Allergies, Past Medical and Surgical History, and Social History in the Epic system.    Review of Systems   Constitutional: Negative for activity change, chills and fever.   HENT: Negative.    Respiratory: Positive for shortness of breath. Negative for cough.    Cardiovascular: Positive for leg swelling. Negative for chest pain.   Gastrointestinal: Negative for abdominal pain, diarrhea, nausea and vomiting.   Genitourinary: Negative for dysuria.   Musculoskeletal: Negative.    Skin: Negative for rash.         Physical Exam   BP: 153/86  Pulse: 59  Heart Rate: 58  Temp: 97.5  F (36.4  C)  Resp: 22  Height: 165.1 cm (5' 5\")  Weight: 61.2 kg (135 lb)  SpO2: 100 %      Physical Exam   Gen: A&Ox3, no acute distress  HEENT: PERRL, no facial tenderness or wounds, head atraumatic, oropharynx clear, mucous membranes moist, TMs clear bilaterally  Neck: no bony tenderness or step offs, no JVD, trachea midline  Back: no CVA tenderness, no midline bony tenderness  CV: bradycardia, irregular rhythm  PULM: Clear to auscultation bilaterally  Abd: soft, nontender, nondistended. Bowel sounds present and normal  UE: No traumatic injuries, skin normal  LE: no traumatic injuries, skin normal, 1+ LE edema in feet and shins  Neuro: CN II-XII intact, strength 5/5 throughout, gait stable.   Skin: no rashes or ecchymoses    ED Course   12:43 PM  The patient was seen and examined by Mariana De La Torre MD in Room 22.     ED Course     Procedures             " EKG Interpretation:      Interpreted by Mariana De La Torre  Time reviewed: 1:02PM  Symptoms at time of EKG: LE edema.    Rhythm: atrial fibrillation   Rate: 51  Axis: normal  Ectopy: none  Conduction: normal  ST Segments/ T Waves: No ST-T wave changes  Q Waves: none  Comparison to prior: Unchanged from Jan 1, 2018    Clinical Impression: atrial fibrillation, bradycardic rate      Critical Care time:  none    Labs Ordered and Resulted from Time of ED Arrival Up to the Time of Departure from the ED   CBC WITH PLATELETS DIFFERENTIAL - Abnormal; Notable for the following:        Result Value    RBC Count 3.64 (*)     RDW 15.5 (*)     All other components within normal limits   BASIC METABOLIC PANEL - Abnormal; Notable for the following:     Glucose 149 (*)     All other components within normal limits   NT PROBNP INPATIENT - Abnormal; Notable for the following:     N-Terminal Pro BNP Inpatient 1950 (*)     All other components within normal limits   INR - Abnormal; Notable for the following:     INR 1.32 (*)     All other components within normal limits   PERIPHERAL IV CATHETER            Assessments & Plan (with Medical Decision Making)   89 yo F presenting with several days of LE edema. Episodes of PND. Concerning for new mild congestive heart failure. Hx of atrial fibrillation, present today with controlled rate. Anticoagulated on Xarelto.    Vitals stable other than mild bradycardia.   Work of breathing not labored. O2 sat on room air 100% and tolerating ambulation in the ED.   EKG not significantly changed from baseline.   CBC, BMP unremarkable.   INR 1.3. Continue Xarelto. Exam not suggestive of new DVT.   CXR with bilateral pleural effusion.   Echo done and reviewed with cardiology fellow on call. Stable atrial and RV dilation suggestive of pulmonary hypertension. No new wall motion abnormalities or decrease in LV EF.   Will start lasix 20mg daily for the next week and will plan for follow up with Cardiology this  week. Will need recheck of electrolytes and creatinine.   Pt's daughter updated by phone. ED social work assisting with arrangement of follow up.       I have reviewed the nursing notes.    I have reviewed the findings, diagnosis, plan and need for follow up with the patient.    Discharge Medication List as of 2/24/2018  4:52 PM      START taking these medications    Details   furosemide (LASIX) 20 MG tablet Take 1 tablet (20 mg) by mouth daily for 7 days, Disp-7 tablet, R-1, Local Print             Final diagnoses:   Peripheral edema   Pleural effusion   ISun, am serving as a trained medical scribe to document services personally performed by Mariana De La Torre MD, based on the provider's statements to me.      I, Mariana De La Torre MD, was physically present and have reviewed and verified the accuracy of this note documented by Sun Cherry.       2/24/2018   [facility], [place], EMERGENCY DEPARTMENT    MD CLAUDIA Yarbrough Katrina Anne, MD  02/24/18 2037

## 2018-02-24 NOTE — DISCHARGE INSTRUCTIONS
Thank you for coming to the [facility] Emergency Department.     Start the diuretic, lasix 20mg, daily for the next week. Kidney function should be rechecked in the next week.     Keep the legs elevated when possible.     We are working to arrange a follow up clinic appointment with Cardiology in the next week to check in your how your breathing and leg swelling is improving.     Echocardiogram today is unchanged from usual.     NT-BNP 1900. Creatinine and electrolytes normal.

## 2018-02-24 NOTE — PROGRESS NOTES
Attempted to place 16 fr cathater via [place]fan[place] opening, catheter passed easily but no urine returned. Catheter removed. Abdomen is more distended.

## 2018-02-24 NOTE — ED NOTES
Bed: ED22  Expected date: 2/24/18  Expected time: 12:24 PM  Means of arrival: Ambulance  Comments:  SPF---SOB, pedal edema, female, 89 yrs old.

## 2018-02-24 NOTE — PROGRESS NOTES
Cardiology on call fellow was called to perform bedside echocardiogram by ED provider Dr. De La Torre    Images are stored.  Interpretation summary of echocardiogram   2/24/2018    LV function, chamber size, wall motion, and wall thickness are normal.  LVEF is 55-60%   mild to moderate right ventricular dilatation is seen  Global right ventricular function is normal  Severe biatrial enlargement  RVSP 42 mmHg  Pulmonary hypertension  Mild MR  Moderate MAC  Severe aortic valve sclerosis.  Mild aortic regurgitation  Mild to moderate TR  IVC is dilated with abnormal respiratory variation.  Estimated mean RA pressure is 15 mmHg    Those findings are basically unchanged from the last echo which was done and 12/30/2017.    ECHO result discussed with Mariana De La Torre MD.    Jian Larios MD  Cardiology Fellow  [phone]

## 2018-02-24 NOTE — PROGRESS NOTES
Social Work: Assessment with Discharge Plan    Patient Name:  Brie Gonzalez  :  1925  Age:  89 year old  MRN:  [id number]  Risk/Complexity Score:     Completed assessment with:  Chart review, patient, ED MD    Presenting Information   Reason for Referral:  Discharge plan  Date of Intake:  2018  Referral Source:  Physician  Decision Maker:  patient  Alternate Decision Maker:  elvira Jenkins p) [phone]  Health Care Directive:  Copy in Chart  Living Situation:  Assisted Living:  [facility]  Previous Functional Status:  Assistance with Transportation:  [facility] bus, Meals:  [facility] , Laundry:  [facility] staff, Housekeeping:  [facility] staff, Medication Management:  [facility] staff and Appointments:  elvira  Patient and family understanding of hospitalization:  SOB  Cultural/Language/Spiritual Considerations:  Gnosticism per demographics  Adjustment to Illness:  Appears to be coping well    Physical Health  Reason for Admission:    1. Peripheral edema    2. Pleural effusion      Services Needed/Recommended:  Other:  assisted living facility    Mental Health/Chemical Dependency  Diagnosis:  None reported.  Support/Services in Place:  None reported.  Services Needed/Recommended:  none    Support System  Significant relationship at present time:  elvira  Family of origin is available for support:  yes  Other support available:  [facility] staff  Gaps in support system:  none  Patient is caregiver to:  None     Provider Information   Primary Care Physician:  Philip Pederson   [phone]   Clinic:  [address] / [facility]*      :  -    Financial   Income Source:  Not discussed  Financial Concerns:  None reported.  Insurance:    Payor/Plan Subscriber Name Rel Member # Group #   [employer] - [employer] MEDIC* BRIE GONZALEZ  [id number]       [id number]       Discharge Plan   Patient and family discharge goal:  Return to [facility]  Provided education on discharge plan:  YES  Patient  agreeable to discharge plan:  YES  A list of Medicare Certified Facilities was provided to the patient and/or family to encourage patient choice. Patient's choices for facility are:  Not applicable at time of assessment.   Will NH provide Skilled rehabilitation or complex medical:  Not applicable at time of assessment.   General information regarding anticipated insurance coverage and possible out of pocket cost was discussed. Patient and patient's family are aware patient may incur the cost of transportation to the facility, pending insurance payment: YES  Barriers to discharge:  none    Discharge Recommendations   Anticipated Disposition:  Facility:  [facility]  Transportation Needs:  Cab:  will call cab once ready for d/c  Name of Transportation Company and Phone:  -    Additional comments   SW consulted by ED MD to arrange f/u in cardiology heart failure clinic. SW met with patient to discuss dispo. Brie is alert and oriented, conversant and engaged in conversation.     Brie is a 89 year old   female who is now residing in [facility]. She has [facility] staff support for meals, laundry/housekpeeing and rx's. She ambulates independently and tries to remain as independent as possible. She used to drive but now takes [facility] transport for activities. Her elvira who is a retired RN is involved with coordinating and supportive Brie.     Plan: Once medically stable, Brie anticipates return to [facility]. She inquired about d/c transport, discuss if she was comfortable with taxi transport. She indicated she is ambulatory without assistive device and has her wallet with her. Her elvira is travelling but may be returning this evening. Agreed to update her via phone. Also sent in-basket message to cardiology CC about need for f/u. ED MD noted she is also ordering cardiology  request so  may call Brie re follow-up appointment.     [facility] (p) [phone]  elvira Webb  Gregory (p) [phone]    Rossi Doty, CONNIE, [facility] [place] [facility]  Social Work Services, Emergency Dept [facility]  Pager: [phone] Mon-Sat 9 am - 9 pm, on-call/after hours pager [phone]

## 2018-02-24 NOTE — ED NOTES
Brie comes in for worsening SOB when reclined and new edema of her feet and ankles. She tells me that she has never been diagnosed with CHF. She is alert and oriented and a good historian. She just moved into [facility] in December and this will be her new, long-term plan.

## 2018-02-26 LAB — INTERPRETATION ECG - MUSE: NORMAL

## 2018-03-02 ENCOUNTER — TRANSFERRED RECORDS (OUTPATIENT)
Dept: HEALTH INFORMATION MANAGEMENT | Facility: CLINIC | Age: 83
End: 2018-03-02

## 2018-03-02 ENCOUNTER — PRE VISIT (OUTPATIENT)
Dept: CARDIOLOGY | Facility: CLINIC | Age: 83
End: 2018-03-02

## 2018-03-02 NOTE — TELEPHONE ENCOUNTER
Previsit nursing summary    HPI:   Brie Gonzalez is a 89 year old female with a history of fibrillation, cerebral infarction, and hypertension who presents to the Emergency Department via EMS for evaluation of shortness of breath.  Patient reports 1 week of waking in the middle of the night with shortness of breath, worse in the last 3 days.  She has also noticed shortness of breath when she is taking the stairs in her assisted living home for the past 2 days.  Patient recently moved to her assisted living home in December 2017.  She has had swelling and soreness in her feet and ankles for the past 2 days.  She has tried using compression socks with no improvement in his tried elevating her legs with improvement.  She denies chest pain. She notes that she has been sleeping in a bed at night with her head propped up for the past 2 months.  Her Coumadin was recently discontinued (about 10 days ago) and she was started on Xarelto.  Denies any other known medical dictation changes    Procedures:    ECHO (2/24/2018)    ECHO (12/31/2017)  Interpretation Summary  Left ventricular function, chamber size, wall motion, and wall thickness are  normal.The EF is 55-60%.  Mild right ventricular dilation is present. Global right ventricular function  is normal.  Severe biatrial enlargement is present.  Pulmonary hypertension is present (RVSP 40 mmHg + RAP 15 mmHg.)  Dilation of the inferior vena cava is present with abnormal respiratory  variation in diameter. Estimated mean right atrial pressure is 15 mmHg.  Previous study not available for comparison.

## 2018-03-03 ENCOUNTER — RADIANT APPOINTMENT (OUTPATIENT)
Dept: GENERAL RADIOLOGY | Facility: CLINIC | Age: 83
End: 2018-03-03
Payer: COMMERCIAL

## 2018-03-03 ENCOUNTER — OFFICE VISIT (OUTPATIENT)
Dept: CARDIOLOGY | Facility: CLINIC | Age: 83
End: 2018-03-03
Attending: INTERNAL MEDICINE
Payer: COMMERCIAL

## 2018-03-03 ENCOUNTER — CARE COORDINATION (OUTPATIENT)
Dept: CARDIOLOGY | Facility: CLINIC | Age: 83
End: 2018-03-03

## 2018-03-03 VITALS
WEIGHT: 133.5 LBS | HEIGHT: 65 IN | BODY MASS INDEX: 22.24 KG/M2 | HEART RATE: 59 BPM | DIASTOLIC BLOOD PRESSURE: 67 MMHG | OXYGEN SATURATION: 98 % | SYSTOLIC BLOOD PRESSURE: 156 MMHG

## 2018-03-03 DIAGNOSIS — R60.0 PERIPHERAL EDEMA: ICD-10-CM

## 2018-03-03 DIAGNOSIS — J90 PLEURAL EFFUSION: ICD-10-CM

## 2018-03-03 DIAGNOSIS — J98.19 PULMONARY COLLAPSE: Primary | ICD-10-CM

## 2018-03-03 PROCEDURE — G0463 HOSPITAL OUTPT CLINIC VISIT: HCPCS | Mod: ZF

## 2018-03-03 PROCEDURE — 99204 OFFICE O/P NEW MOD 45 MIN: CPT | Mod: ZP | Performed by: INTERNAL MEDICINE

## 2018-03-03 ASSESSMENT — PAIN SCALES - GENERAL: PAINLEVEL: NO PAIN (0)

## 2018-03-03 NOTE — NURSING NOTE
Chief Complaint   Patient presents with     New Patient     Hospital F/U Peripheral Edema, Pleural Effusion, Shortness of Breath, Hx of AFib     Vitals were taken and medications were reconciled.  KEYONA Cox  9:24 AM

## 2018-03-03 NOTE — PROGRESS NOTES
Date: 3/3/2018    Time of Call: 12:55 PM     Diagnosis:  Atelectasis/small bilateral pleural effusione     [ TORB ] Ordering provider: Dr. John Cheek  Order: continue the lasix 20 mg daily for one week  Incentive Spirometer:  inhalation X 10, at least 6 times daily     Order received by: Esther Leon RN     Follow-up/additional notes: Faxed orders to [facility]  Please obtain incentive spirometer from pharmacy

## 2018-03-03 NOTE — LETTER
3/3/2018      RE: Brie Gonzalez  [facility]  [address] [place], [place] [address]  [place] [address]       Dear Colleague,    Thank you for the opportunity to participate in the care of your patient, Brie Gonzalez, at the [facility]. Please see a copy of my visit note below.    CARDIOLOGY CONSULTATION    Referring Provider:  Mariana De La Torre  Primary Care Provider:   Philip Pederson  Indication for Consultation:  SOB    HPI: Brie Gonzalez is a 89 year old female being seen today for evaluation of SOB.   The patient's risk factor profile is: (+) HTN [5 years, Rx], (-) DM, (+) hypercholesterolemia, (+)  remote tobacco use [2 years, quit 60 yrs ago], (-) fam Hx premature CAD.  The patient has no history of CAD, CHF, or valvular heart disease).  She has history of PAF and had been on coumadin but was recently switched to Xarelto.  She has Hx of prior TIA but not prior CVA (prior to initiation of Rx).  The PAF was identified in the course of working up the TIA.   The patient has no Hx of PAD.    The patient had been in her good state of health until Nov 2017 when she developed diverticulits, treated with ABX.  She subsequently had recurrent epistaxis requiring nasal packing.  She was in a TCU due to anxiety and depression.  She then transitioned to assisted living but developed RSV and was hospitalized overnight at [facility].  An ECHO at that time was normal.  She had been doing fair until a week ago when she began to experience difficulty with taking a deep breath while lying down.  No chest pain.  So SOB at rest or with activity. She went to our ER and was monitored for a day.  She had CXR showing small, left greater than right, bilateral pleural effusions and associated perihilar and bibasilar opacities, atelectasis versus infection. No fever, chills, or sweats.  No cough. She was given Lasix and has been urinating frequently. She has been carrying out her  routine activity over the past week.    The patient has not undergone prior cardiovascular evaluation and has never had an ECHO, stress study, cardiac catheterization, or EP study.   The patient denies a history of chest discomfort, dyspnea, PND, orthopnea, pedal edema, palpitations, lightheadedness, and syncope.      PAST MEDICAL HISTORY:  Past Medical History:   Diagnosis Date     Anxiety 2.5 years ago     Arthritis Hands for years     Atrial fibrillation (H)      Cerebral infarction (H) 4-5 years ago     Depressive disorder 2.5 years ago intermittent     Hypertension 4-5 years ago       CURRENT MEDICATIONS:  Current Outpatient Prescriptions   Medication Sig Dispense Refill     [START ON 3/26/2018] METOPROLOL SUCCINATE ER PO Take 12.5 mg by mouth daily       Rivaroxaban (XARELTO PO) Take 10 mg by mouth daily       furosemide (LASIX) 20 MG tablet Take 1 tablet (20 mg) by mouth daily for 7 days 7 tablet 1     LORAZEPAM PO Take 0.5 mg by mouth nightly as needed for anxiety       albuterol (PROAIR HFA/PROVENTIL HFA/VENTOLIN HFA) 108 (90 BASE) MCG/ACT Inhaler Inhale 2 puffs into the lungs every 4 hours as needed for shortness of breath / dyspnea or wheezing       sodium chloride (OCEAN) 0.65 % nasal spray Spray 1 spray into both nostrils every 2 hours 1 Bottle 0     metoprolol (LOPRESSOR) 25 MG tablet Take 2 tablets (50 mg) by mouth daily 10 tablet 0     ACETAMINOPHEN PO Take 325 mg by mouth every 4 hours as needed for pain       VITAMIN D, CHOLECALCIFEROL, PO Take 1,000 Units by mouth daily       oxymetazoline (AFRIN) 0.05 % spray Spray 2 sprays into both nostrils 2 times daily as needed for congestion       PAROXETINE HCL PO Take 10 mg by mouth daily       azithromycin (ZITHROMAX) 250 MG tablet Take 2 tablets by mouth on day 1, then 1 tablet by mouth daily for 4 days       calcium-vitamin D (CALTRATE) 600-400 MG-UNIT per tablet Take 1 tablet by mouth daily       SIMVASTATIN PO Take 20 mg by mouth At Bedtime        "Warfarin Sodium (COUMADIN PO) Take 2.5 mg by mouth daily        ASPIRIN PO Take 81 mg by mouth every other day          PAST SURGICAL HISTORY:  Past Surgical History:   Procedure Laterality Date     ADENOIDECTOMY  1930's     GYN SURGERY   1970's uterine fibroids     TONSILLECTOMY  1930's       ALLERGIES  Penicillins    FAMILY HX:  Family History   Problem Relation Age of Onset     Dementia Mother      CEREBROVASCULAR DISEASE Mother        SOCIAL HX:  Social History     Social History     Marital status:      Spouse name: N/A     Number of children: N/A     Years of education: N/A     Social History Main Topics     Smoking status: Never Smoker     Smokeless tobacco: Never Used     Alcohol use Yes      Comment: Rare     Drug use: No     Sexual activity: No     Other Topics Concern     Not on file     Social History Narrative       ROS:  Constitutional: No fever, chills, or sweats. No weight gain/loss.   HEENT: No visual disturbance, ear ache, epistaxis, sore throat.   Allergies/Immunologic: Negative.   Respiratory: No cough, hemoptysis.   Cardiovascular: As per HPI.   GI: No nausea, vomiting, hematemesis, melena, or hematochezia.   : No urinary frequency, dysuria, or hematuria.   Integument: No rash.   Psychiatric: No anxiety / depression.   Neuro: No speech disturbance, focal sensory or motor deficit.   Endocrinology: No polyuria / polyphagia.   Musculoskeletal: No myalgia.    VITAL SIGNS:  /67 (BP Location: Left arm, Patient Position: Chair, Cuff Size: Adult Regular)  Pulse 59  Ht 1.651 m (5' 5\")  Wt 60.6 kg (133 lb 8 oz)  SpO2 98%  BMI 22.22 kg/m2  Body mass index is 22.22 kg/(m^2).  Wt Readings from Last 2 Encounters:   03/03/18 60.6 kg (133 lb 8 oz)   02/24/18 61.2 kg (135 lb)       PHYSICAL EXAM  Brie Gonzalez is a 89 year old female.in no acute distress.  HEENT: Eyes Nonicteric.  Neck: JVP normal.  Carotids +3/3 bilaterally without bruits.  Lungs: CTA.  Cor: RRR. Normal S1 and S2.  No murmur, " rub, or gallop.  PMI in Lf 5th ICS.  Abd: Soft, nontender, nondistended.  NABS.  No pulsatile mass.  Extremities: No C/C/E.  Pulses +3/3 symmetric in upper and lower extremities.  Neuro: Grossly intact.  Psych: A&O x 3.  Skin: No rash.    LABS  Recent Labs   Lab Test  02/24/18   1315  01/01/18   0057   WBC  5.2  8.9   HGB  11.7  11.1*   HCT  36.0  33.5*   PLT  186  134*     Recent Labs   Lab Test  02/24/18   1315  01/01/18   0057   NA  142  133   POTASSIUM  4.1  4.4   CHLORIDE  107  101   CO2  28  23   GLC  149*  100*   BUN  14  22   CR  0.79  0.93   THIERRY  8.6  8.7     No results for input(s): CHOL, HDL, LDL, TRIG, CHOLHDLRATIO, NHDL in the last 39246 hours.     EKG:  AF, vent rate 51.  Anterior MI (equivocal) based on PRWP    ECHO: 12/31/17  Left ventricular function, chamber size, wall motion, and wall thickness are  normal.The EF is 55-60%.  Mild right ventricular dilation is present. Global right ventricular function is normal.  Severe biatrial enlargement is present.  Pulmonary hypertension is present (RVSP 40 mmHg + RAP 15 mmHg.)  Dilation of the inferior vena cava is present with abnormal respiratory variation in diameter. Estimated mean right atrial pressure is 15 mmHg.  Previous study not available for comparison.    ECHO (2/24/18):  Not reported    STRESS TEST:  None    CARDIAC CATH: None    ASSESSMENT AND PLAN:   1. Dyspnea.  Ms. Gonzalez's symptoms are atypical and I am wondering if her symptoms represent resolution of atelectasis or viral infection.  Both her ECHO in Dec 2017 and the repeat ECHO last week were essentially normal, essentially ruling out both valvular heart disease and heart failure.  I would like to get a repeat CXR on the patient to ensure the abnormalities seen on the prior study are not still present.  I would then consider stopping the Lasix.  If the dyspnea persists, PFTs would be my next step.    2. Atrial Fibrillation.  Anticoagulated.  Rate controlled. I do not believe this is the  cause of her dyspnea.  No further evaluation given the recent ECHO.    FOLLOW UP:  PCP, one month      John Cheek MD    Divisions of Cardiology  [place], [place]    CC  Patient Care Team:  Philip Pederson as PCP - General (Physician Assistant)  JORDAN DICK

## 2018-03-03 NOTE — NURSING NOTE
Med Reconcile: Reviewed and verified all current medications with the patient. The updated medication list was printed and given to the patient.  Return Appointment: Patient given instructions regarding scheduling next clinic visit. Patient demonstrated understanding of this information and agreed to call with further questions or concerns.  Patient stated she understood all health information given and agreed to call with further questions or concerns.

## 2018-03-03 NOTE — PATIENT INSTRUCTIONS
It was a pleasure to see you in the cardiology clinic today.    If you have any questions, you can reach my nurse, Esther Leon, at [phone].  Press Option #1 for the [facility], and then press Option #3 for nursing.    Note the new medications: None  Stop the following medications: None    The results from today include: None    Tests ordered today: CHEST X RAY    I would like you to follow up with PCP in 2-3 weeks.    Sincerely,      John Cheek MD     [facility]

## 2018-03-03 NOTE — MR AVS SNAPSHOT
After Visit Summary   3/3/2018    Brie Gonzalez    MRN: [id number]           Patient Information     Date Of Birth          7/8/1925        Visit Information        Provider Department      3/3/2018 9:30 AM John Cheek MD [facility]        Today's Diagnoses     Pulmonary collapse    -  1    Peripheral edema        Pleural effusion          Care Instructions      It was a pleasure to see you in the cardiology clinic today.    If you have any questions, you can reach my nurse, Esther Leon, at [phone].  Press Option #1 for the [facility], and then press Option #3 for nursing.    Note the new medications: None  Stop the following medications: None    The results from today include: None    Tests ordered today: CHEST X RAY    I would like you to follow up with PCP in 2-3 weeks.    Sincerely,      John Cheek MD     [facility]                Follow-ups after your visit        Who to contact     If you have questions or need follow up information about today's clinic visit or your schedule please contact [facility] directly at [phone].  Normal or non-critical lab and imaging results will be communicated to you by [employer], letter or phone within 4 business days after the clinic has received the results. If you do not hear from us within 7 days, please contact the clinic through [employer] or phone. If you have a critical or abnormal lab result, we will notify you by phone as soon as possible.  Submit refill requests through [employer] or call your pharmacy and they will forward the refill request to us. Please allow 3 business days for your refill to be completed.          Additional Information About Your Visit        [employer] Information     [employer] lets you send messages to your doctor, view your test results, renew your prescriptions, schedule appointments and more. To sign up, go to [email or web address] .  "Click on \"Log in\" on the left side of the screen, which will take you to the Welcome page. Then click on \"Sign up Now\" on the right side of the page.     You will be asked to enter the access code listed below, as well as some personal information. Please follow the directions to create your username and password.     Your access code is: [email or web address]  Expires: 2018  4:52 PM     Your access code will  in 90 days. If you need help or a new code, please call your [facility] clinic or [phone].        Care EveryWhere ID     This is your Care EveryWhere ID. This could be used by other organizations to access your [facility] medical records  [id number]        Your Vitals Were     Pulse Height Pulse Oximetry BMI (Body Mass Index)          59 1.651 m (5' 5\") 98% 22.22 kg/m2         Blood Pressure from Last 3 Encounters:   18 156/67   18 170/81   18 (!) 137/93    Weight from Last 3 Encounters:   18 60.6 kg (133 lb 8 oz)   18 61.2 kg (135 lb)   18 60.3 kg (132 lb 15 oz)              Today, you had the following     No orders found for display       Primary Care Provider Office Phone # Fax #    Philip Pederson [phone]       [facility] [address] [facility] [address]        Equal Access to Services     [facility] AH: Hadii aad ku hadasho Soomaali, waaxda luqadaha, qaybta kaalmada adeegyada, ana lebron . So [facility] [phone].    ATENCIÓN: Si habla español, tiene a posada disposición servicios gratuitos de asistencia lingüística. Llame al [phone].    We comply with applicable federal civil rights laws and [place] laws. We do not discriminate on the basis of race, color, national origin, age, disability, sex, sexual orientation, or gender identity.            Thank you!     Thank you for choosing [facility]  for your care. Our goal is always to provide you with excellent care. Hearing back from our " patients is one way we can continue to improve our services. Please take a few minutes to complete the written survey that you may receive in the mail after your visit with us. Thank you!             Your Updated Medication List - Protect others around you: Learn how to safely use, store and throw away your medicines at www.disposemymeds.org.          This list is accurate as of 3/3/18 10:47 AM.  Always use your most recent med list.                   Brand Name Dispense Instructions for use Diagnosis    ACETAMINOPHEN PO      Take 325 mg by mouth every 4 hours as needed for pain        albuterol 108 (90 BASE) MCG/ACT Inhaler    PROAIR HFA/PROVENTIL HFA/VENTOLIN HFA     Inhale 2 puffs into the lungs every 4 hours as needed for shortness of breath / dyspnea or wheezing        ASPIRIN PO      Take 81 mg by mouth every other day        azithromycin 250 MG tablet    ZITHROMAX     Take 2 tablets by mouth on day 1, then 1 tablet by mouth daily for 4 days        calcium-vitamin D 600-400 MG-UNIT per tablet    CALTRATE     Take 1 tablet by mouth daily        COUMADIN PO      Take 2.5 mg by mouth daily        furosemide 20 MG tablet    LASIX    7 tablet    Take 1 tablet (20 mg) by mouth daily for 7 days        LORAZEPAM PO      Take 0.5 mg by mouth nightly as needed for anxiety        METOPROLOL SUCCINATE ER PO   Start taking on:  3/26/2018      Take 12.5 mg by mouth daily        metoprolol tartrate 25 MG tablet    LOPRESSOR    10 tablet    Take 2 tablets (50 mg) by mouth daily    Chronic atrial fibrillation (H)       oxymetazoline 0.05 % spray    AFRIN     Spray 2 sprays into both nostrils 2 times daily as needed for congestion        PAROXETINE HCL PO      Take 10 mg by mouth daily        SIMVASTATIN PO      Take 20 mg by mouth At Bedtime        sodium chloride 0.65 % nasal spray    OCEAN    1 Bottle    Spray 1 spray into both nostrils every 2 hours    Epistaxis       VITAMIN D (CHOLECALCIFEROL) PO      Take 1,000 Units by  mouth daily        XARELTO PO      Take 10 mg by mouth daily

## 2018-03-03 NOTE — PROGRESS NOTES
CARDIOLOGY CONSULTATION    Referring Provider:  Mariana De La Torre  Primary Care Provider:   Philip Pederson  Indication for Consultation:  SOB    HPI: Brie Gonzalez is a 89 year old female being seen today for evaluation of SOB.   The patient's risk factor profile is: (+) HTN [5 years, Rx], (-) DM, (+) hypercholesterolemia, (+)  remote tobacco use [2 years, quit 60 yrs ago], (-) fam Hx premature CAD.  The patient has no history of CAD, CHF, or valvular heart disease).  She has history of PAF and had been on coumadin but was recently switched to Xarelto.  She has Hx of prior TIA but not prior CVA (prior to initiation of Rx).  The PAF was identified in the course of working up the TIA.   The patient has no Hx of PAD.    The patient had been in her good state of health until Nov 2017 when she developed diverticulits, treated with ABX.  She subsequently had recurrent epistaxis requiring nasal packing.  She was in a TCU due to anxiety and depression.  She then transitioned to assisted living but developed RSV and was hospitalized overnight at [facility].  An ECHO at that time was normal.  She had been doing fair until a week ago when she began to experience difficulty with taking a deep breath while lying down.  No chest pain.  So SOB at rest or with activity. She went to our ER and was monitored for a day.  She had CXR showing small, left greater than right, bilateral pleural effusions and associated perihilar and bibasilar opacities, atelectasis versus infection. No fever, chills, or sweats.  No cough. She was given Lasix and has been urinating frequently. She has been carrying out her routine activity over the past week.    The patient has not undergone prior cardiovascular evaluation and has never had an ECHO, stress study, cardiac catheterization, or EP study.   The patient denies a history of chest discomfort, dyspnea, PND, orthopnea, pedal edema, palpitations, lightheadedness, and syncope.      PAST MEDICAL HISTORY:  Past  Medical History:   Diagnosis Date     Anxiety 2.5 years ago     Arthritis Hands for years     Atrial fibrillation (H)      Cerebral infarction (H) 4-5 years ago     Depressive disorder 2.5 years ago intermittent     Hypertension 4-5 years ago       CURRENT MEDICATIONS:  Current Outpatient Prescriptions   Medication Sig Dispense Refill     [START ON 3/26/2018] METOPROLOL SUCCINATE ER PO Take 12.5 mg by mouth daily       Rivaroxaban (XARELTO PO) Take 10 mg by mouth daily       furosemide (LASIX) 20 MG tablet Take 1 tablet (20 mg) by mouth daily for 7 days 7 tablet 1     LORAZEPAM PO Take 0.5 mg by mouth nightly as needed for anxiety       albuterol (PROAIR HFA/PROVENTIL HFA/VENTOLIN HFA) 108 (90 BASE) MCG/ACT Inhaler Inhale 2 puffs into the lungs every 4 hours as needed for shortness of breath / dyspnea or wheezing       sodium chloride (OCEAN) 0.65 % nasal spray Spray 1 spray into both nostrils every 2 hours 1 Bottle 0     metoprolol (LOPRESSOR) 25 MG tablet Take 2 tablets (50 mg) by mouth daily 10 tablet 0     ACETAMINOPHEN PO Take 325 mg by mouth every 4 hours as needed for pain       VITAMIN D, CHOLECALCIFEROL, PO Take 1,000 Units by mouth daily       oxymetazoline (AFRIN) 0.05 % spray Spray 2 sprays into both nostrils 2 times daily as needed for congestion       PAROXETINE HCL PO Take 10 mg by mouth daily       azithromycin (ZITHROMAX) 250 MG tablet Take 2 tablets by mouth on day 1, then 1 tablet by mouth daily for 4 days       calcium-vitamin D (CALTRATE) 600-400 MG-UNIT per tablet Take 1 tablet by mouth daily       SIMVASTATIN PO Take 20 mg by mouth At Bedtime       Warfarin Sodium (COUMADIN PO) Take 2.5 mg by mouth daily        ASPIRIN PO Take 81 mg by mouth every other day          PAST SURGICAL HISTORY:  Past Surgical History:   Procedure Laterality Date     ADENOIDECTOMY  1930's     GYN SURGERY   1970's uterine fibroids     TONSILLECTOMY  1930's       ALLERGIES  Penicillins    FAMILY HX:  Family History  "  Problem Relation Age of Onset     Dementia Mother      CEREBROVASCULAR DISEASE Mother        SOCIAL HX:  Social History     Social History     Marital status:      Spouse name: N/A     Number of children: N/A     Years of education: N/A     Social History Main Topics     Smoking status: Never Smoker     Smokeless tobacco: Never Used     Alcohol use Yes      Comment: Rare     Drug use: No     Sexual activity: No     Other Topics Concern     Not on file     Social History Narrative       ROS:  Constitutional: No fever, chills, or sweats. No weight gain/loss.   HEENT: No visual disturbance, ear ache, epistaxis, sore throat.   Allergies/Immunologic: Negative.   Respiratory: No cough, hemoptysis.   Cardiovascular: As per HPI.   GI: No nausea, vomiting, hematemesis, melena, or hematochezia.   : No urinary frequency, dysuria, or hematuria.   Integument: No rash.   Psychiatric: No anxiety / depression.   Neuro: No speech disturbance, focal sensory or motor deficit.   Endocrinology: No polyuria / polyphagia.   Musculoskeletal: No myalgia.    VITAL SIGNS:  /67 (BP Location: Left arm, Patient Position: Chair, Cuff Size: Adult Regular)  Pulse 59  Ht 1.651 m (5' 5\")  Wt 60.6 kg (133 lb 8 oz)  SpO2 98%  BMI 22.22 kg/m2  Body mass index is 22.22 kg/(m^2).  Wt Readings from Last 2 Encounters:   03/03/18 60.6 kg (133 lb 8 oz)   02/24/18 61.2 kg (135 lb)       PHYSICAL EXAM  Brie Gonzalez is a 89 year old female.in no acute distress.  HEENT: Eyes Nonicteric.  Neck: JVP normal.  Carotids +3/3 bilaterally without bruits.  Lungs: CTA.  Cor: RRR. Normal S1 and S2.  No murmur, rub, or gallop.  PMI in Lf 5th ICS.  Abd: Soft, nontender, nondistended.  NABS.  No pulsatile mass.  Extremities: No C/C/E.  Pulses +3/3 symmetric in upper and lower extremities.  Neuro: Grossly intact.  Psych: A&O x 3.  Skin: No rash.    LABS  Recent Labs   Lab Test  02/24/18   1315  01/01/18   0057   WBC  5.2  8.9   HGB  11.7  11.1*   HCT  " 36.0  33.5*   PLT  186  134*     Recent Labs   Lab Test  02/24/18   1315  01/01/18   0057   NA  142  133   POTASSIUM  4.1  4.4   CHLORIDE  107  101   CO2  28  23   GLC  149*  100*   BUN  14  22   CR  0.79  0.93   THIERRY  8.6  8.7     No results for input(s): CHOL, HDL, LDL, TRIG, CHOLHDLRATIO, NHDL in the last 09195 hours.     EKG:  AF, vent rate 51.  Anterior MI (equivocal) based on PRWP    ECHO: 12/31/17  Left ventricular function, chamber size, wall motion, and wall thickness are  normal.The EF is 55-60%.  Mild right ventricular dilation is present. Global right ventricular function is normal.  Severe biatrial enlargement is present.  Pulmonary hypertension is present (RVSP 40 mmHg + RAP 15 mmHg.)  Dilation of the inferior vena cava is present with abnormal respiratory variation in diameter. Estimated mean right atrial pressure is 15 mmHg.  Previous study not available for comparison.    ECHO (2/24/18):  Not reported    STRESS TEST:  None    CARDIAC CATH: None    ASSESSMENT AND PLAN:   1. Dyspnea.  Ms. Gonzalez's symptoms are atypical and I am wondering if her symptoms represent resolution of atelectasis or viral infection.  Both her ECHO in Dec 2017 and the repeat ECHO last week were essentially normal, essentially ruling out both valvular heart disease and heart failure.  I would like to get a repeat CXR on the patient to ensure the abnormalities seen on the prior study are not still present.  I would then consider stopping the Lasix.  If the dyspnea persists, PFTs would be my next step.    2. Atrial Fibrillation.  Anticoagulated.  Rate controlled. I do not believe this is the cause of her dyspnea.  No further evaluation given the recent ECHO.    FOLLOW UP:  PCP, one month      John Cheek MD    Divisions of Cardiology  [facility]  Patient Care Team:  Philip Pederson as PCP - General (Physician Assistant)  JORDAN DICK

## 2018-03-20 ENCOUNTER — CARE COORDINATION (OUTPATIENT)
Dept: CARDIOLOGY | Facility: CLINIC | Age: 83
End: 2018-03-20

## 2018-03-20 NOTE — PROGRESS NOTES
PA from [facility] calling in and stating that Brie has been off Lasix and she is starting to develop some lower extremity edema.  She noted that since seeing Dr. Cheek her NT-pro BNP has fallen to 400. Weights have not been followed.  The PA wanted to know if she should restart the Lasix.    Recommended daily weights:    Contact us if patient    * Gain more than 2 pounds in one day or 5 pounds in one week  * Have increased shortness of breath  * Wake up short of breath or cannot sleep lying down  * Have increased swelling in your legs, ankles, or abdomen (belly)

## 2018-05-13 ENCOUNTER — APPOINTMENT (OUTPATIENT)
Dept: CT IMAGING | Facility: CLINIC | Age: 83
DRG: 291 | End: 2018-05-13
Attending: EMERGENCY MEDICINE
Payer: COMMERCIAL

## 2018-05-13 ENCOUNTER — APPOINTMENT (OUTPATIENT)
Dept: GENERAL RADIOLOGY | Facility: CLINIC | Age: 83
DRG: 291 | End: 2018-05-13
Attending: EMERGENCY MEDICINE
Payer: COMMERCIAL

## 2018-05-13 ENCOUNTER — HOSPITAL ENCOUNTER (INPATIENT)
Facility: CLINIC | Age: 83
LOS: 3 days | Discharge: NURSING, REHAB OR LONG-TERM CARE | DRG: 291 | End: 2018-05-17
Attending: EMERGENCY MEDICINE | Admitting: INTERNAL MEDICINE
Payer: COMMERCIAL

## 2018-05-13 DIAGNOSIS — I50.30 HEART FAILURE WITH PRESERVED EJECTION FRACTION, NYHA CLASS II (H): ICD-10-CM

## 2018-05-13 DIAGNOSIS — J18.9 PNEUMONIA OF LEFT LOWER LOBE DUE TO INFECTIOUS ORGANISM: ICD-10-CM

## 2018-05-13 DIAGNOSIS — J96.01 ACUTE RESPIRATORY FAILURE WITH HYPOXIA (H): ICD-10-CM

## 2018-05-13 DIAGNOSIS — I48.91 ATRIAL FIBRILLATION, UNSPECIFIED TYPE (H): ICD-10-CM

## 2018-05-13 DIAGNOSIS — J20.8 VIRAL BRONCHITIS: Primary | ICD-10-CM

## 2018-05-13 DIAGNOSIS — I21.4 NSTEMI (NON-ST ELEVATED MYOCARDIAL INFARCTION) (H): ICD-10-CM

## 2018-05-13 DIAGNOSIS — Z79.01 LONG-TERM (CURRENT) USE OF ANTICOAGULANTS: ICD-10-CM

## 2018-05-13 LAB
ALBUMIN SERPL-MCNC: 4.1 G/DL (ref 3.4–5)
ALP SERPL-CCNC: 95 U/L (ref 40–150)
ALT SERPL W P-5'-P-CCNC: 25 U/L (ref 0–50)
ANION GAP SERPL CALCULATED.3IONS-SCNC: 7 MMOL/L (ref 3–14)
AST SERPL W P-5'-P-CCNC: 28 U/L (ref 0–45)
BASOPHILS # BLD AUTO: 0 10E9/L (ref 0–0.2)
BASOPHILS NFR BLD AUTO: 0.2 %
BILIRUB SERPL-MCNC: 1.3 MG/DL (ref 0.2–1.3)
BUN SERPL-MCNC: 18 MG/DL (ref 7–30)
CALCIUM SERPL-MCNC: 9.2 MG/DL (ref 8.5–10.1)
CHLORIDE SERPL-SCNC: 101 MMOL/L (ref 94–109)
CO2 BLDCOV-SCNC: 30 MMOL/L (ref 21–28)
CO2 SERPL-SCNC: 27 MMOL/L (ref 20–32)
CREAT SERPL-MCNC: 0.86 MG/DL (ref 0.52–1.04)
DIFFERENTIAL METHOD BLD: ABNORMAL
EOSINOPHIL # BLD AUTO: 0 10E9/L (ref 0–0.7)
EOSINOPHIL NFR BLD AUTO: 0.1 %
ERYTHROCYTE [DISTWIDTH] IN BLOOD BY AUTOMATED COUNT: 15.9 % (ref 10–15)
GFR SERPL CREATININE-BSD FRML MDRD: 61 ML/MIN/1.7M2
GLUCOSE SERPL-MCNC: 136 MG/DL (ref 70–99)
HCT VFR BLD AUTO: 39.6 % (ref 35–47)
HGB BLD-MCNC: 13.4 G/DL (ref 11.7–15.7)
IMM GRANULOCYTES # BLD: 0.1 10E9/L (ref 0–0.4)
IMM GRANULOCYTES NFR BLD: 0.7 %
LACTATE BLD-SCNC: 1.7 MMOL/L (ref 0.7–2.1)
LYMPHOCYTES # BLD AUTO: 1.5 10E9/L (ref 0.8–5.3)
LYMPHOCYTES NFR BLD AUTO: 14 %
MCH RBC QN AUTO: 31.3 PG (ref 26.5–33)
MCHC RBC AUTO-ENTMCNC: 33.8 G/DL (ref 31.5–36.5)
MCV RBC AUTO: 93 FL (ref 78–100)
MONOCYTES # BLD AUTO: 1.3 10E9/L (ref 0–1.3)
MONOCYTES NFR BLD AUTO: 12.2 %
NEUTROPHILS # BLD AUTO: 7.5 10E9/L (ref 1.6–8.3)
NEUTROPHILS NFR BLD AUTO: 72.8 %
NRBC # BLD AUTO: 0 10*3/UL
NRBC BLD AUTO-RTO: 0 /100
PCO2 BLDV: 47 MM HG (ref 40–50)
PH BLDV: 7.41 PH (ref 7.32–7.43)
PLATELET # BLD AUTO: 161 10E9/L (ref 150–450)
PO2 BLDV: 31 MM HG (ref 25–47)
POTASSIUM SERPL-SCNC: 3.9 MMOL/L (ref 3.4–5.3)
PROT SERPL-MCNC: 7.6 G/DL (ref 6.8–8.8)
RBC # BLD AUTO: 4.28 10E12/L (ref 3.8–5.2)
SAO2 % BLDV FROM PO2: 59 %
SODIUM SERPL-SCNC: 135 MMOL/L (ref 133–144)
TROPONIN I SERPL-MCNC: 0.24 UG/L (ref 0–0.04)
WBC # BLD AUTO: 10.3 10E9/L (ref 4–11)

## 2018-05-13 PROCEDURE — 80053 COMPREHEN METABOLIC PANEL: CPT | Performed by: EMERGENCY MEDICINE

## 2018-05-13 PROCEDURE — 87040 BLOOD CULTURE FOR BACTERIA: CPT | Performed by: EMERGENCY MEDICINE

## 2018-05-13 PROCEDURE — 85025 COMPLETE CBC W/AUTO DIFF WBC: CPT | Performed by: EMERGENCY MEDICINE

## 2018-05-13 PROCEDURE — 71045 X-RAY EXAM CHEST 1 VIEW: CPT

## 2018-05-13 PROCEDURE — 71260 CT THORAX DX C+: CPT

## 2018-05-13 PROCEDURE — 83880 ASSAY OF NATRIURETIC PEPTIDE: CPT | Performed by: EMERGENCY MEDICINE

## 2018-05-13 PROCEDURE — 99285 EMERGENCY DEPT VISIT HI MDM: CPT | Mod: 25 | Performed by: EMERGENCY MEDICINE

## 2018-05-13 PROCEDURE — 93010 ELECTROCARDIOGRAM REPORT: CPT | Mod: Z6 | Performed by: EMERGENCY MEDICINE

## 2018-05-13 PROCEDURE — 82803 BLOOD GASES ANY COMBINATION: CPT

## 2018-05-13 PROCEDURE — 84145 PROCALCITONIN (PCT): CPT | Performed by: EMERGENCY MEDICINE

## 2018-05-13 PROCEDURE — 83605 ASSAY OF LACTIC ACID: CPT

## 2018-05-13 PROCEDURE — 84484 ASSAY OF TROPONIN QUANT: CPT | Performed by: EMERGENCY MEDICINE

## 2018-05-13 PROCEDURE — 93005 ELECTROCARDIOGRAM TRACING: CPT | Performed by: EMERGENCY MEDICINE

## 2018-05-13 RX ORDER — IOPAMIDOL 755 MG/ML
53 INJECTION, SOLUTION INTRAVASCULAR ONCE
Status: COMPLETED | OUTPATIENT
Start: 2018-05-13 | End: 2018-05-14

## 2018-05-13 ASSESSMENT — ENCOUNTER SYMPTOMS
FEVER: 1
NAUSEA: 0
ABDOMINAL PAIN: 0
HEMATURIA: 0
COUGH: 1
FREQUENCY: 0
SHORTNESS OF BREATH: 1
DYSURIA: 0
HEADACHES: 0
SORE THROAT: 0
VOMITING: 0
COLOR CHANGE: 0
DIZZINESS: 1

## 2018-05-13 NOTE — LETTER
Transition Communication Hand-off for Care Transitions to Next Level of Care Provider    Name: Brie Gonzalez  : 1925  MRN #: [id number]  Primary Care Provider: Mary Ellen Arevalo     Primary Clinic: [facility] [address]   [facility] [address]     Reason for Hospitalization:    Long-term (current) use of anticoagulants [Z79.01]  NSTEMI (non-ST elevated myocardial infarction) (H) [I21.4]  Acute respiratory failure with hypoxia (H) [J96.01]  Pneumonia of left lower lobe due to infectious organism (H) [J18.1]  Atrial fibrillation, unspecified type (H) [I48.91]    Admit Date/Time: 2018 10:15 PM  Discharge Date: 2018    Payor Source: Payor: HUMANA / Plan: HUMANA MEDICARE ADVANTAGE / Product Type: Medicare /     Discharge Needs Assessment:  Needs       Most Recent Value    Equipment Currently Used at Home none          Follow-up plan:  No future appointments.    Key Recommendations:  Please review AVS    Sharee JACKSON RN PHN  Patient Care Management Coordinator  Luz [facility]pie [facility], and [facility]  Phone: [phone] / Pager: [phone]      AVS/Discharge Summary is the source of truth; this is a helpful guide for improved communication of patient story

## 2018-05-13 NOTE — IP AVS SNAPSHOT
MRN:[id number]                      After Visit Summary   5/13/2018    Brie Gonzalez    MRN: [id number]           Thank you!     Thank you for choosing [facility] for your care. Our goal is always to provide you with excellent care. Hearing back from our patients is one way we can continue to improve our services. Please take a few minutes to complete the written survey that you may receive in the mail after you visit with us. Thank you!        Patient Information     Date Of Birth          7/8/1925        Designated Caregiver       Most Recent Value    Caregiver    Will someone help with your care after discharge? yes    Name of designated caregiver Tracey - daughter    Phone number of caregiver [phone]    Caregiver address [place], [place]      About your hospital stay     You were admitted on:  May 14, 2018 You last received care in the:  [address] [facility]    You were discharged on:  May 17, 2018        Reason for your hospital stay       Viral bronchitis, exacerbation of HFpEF                  Who to Call     For medical emergencies, please call 911.  For non-urgent questions about your medical care, please call your primary care provider or clinic, [phone]          Attending Provider     Provider Specialty    Mariana De La Torre MD Emergency Medicine    Eusebio, Blanca Moreno MD Emergency Medicine    Flaca, Rene Lyles MD Internal Medicine    Anna Jerry MD Internal Medicine       Primary Care Provider Office Phone # Fax #    Mary Ellen SchroederAILEEN wolfe [phone]      After Care Instructions     Activity       Your activity upon discharge: activity as tolerated            Diet       Follow this diet upon discharge: Orders Placed This Encounter      Room Service      Combination Diet Regular Diet Adult            Discharge Instructions       Follow-up with primary care doctor in 1-2 weeks for medication change evaluation and posthospital follow-up.    He can use  her albuterol inhaler as needed.  We yourself daily and if her weight is above 130 pounds he can take an as needed dose of Lasix 20 mg.  Please discuss this medication change with her primary care doctor when you follow-up.            Monitor and record       weight every day and adjust diuretics as instructed.                  Follow-up Appointments     Adult [facility] Follow-up and recommended labs and tests       Follow up with primary care provider, Mary Ellen Arevalo, within 7 days to evaluate medication change and for hospital follow- up.  The following labs/tests are recommended: basic metabolic panel..      Appointments on [place] and/or [facility] (with [facility] or [facility] provider or service). Call [phone] if you haven't heard regarding these appointments within 7 days of discharge.                  General Recommendations To Control Heart Failure When You Get Home       Heart Failure Instructions for Patients and Families: Please read and check off each of these important instructions as you do them when you get home.          Weight and Symptoms       ___ Put a scale in your bathroom.    ___ Post a weight chart or calendar next to your scale.    ___ Weigh yourself everyday as soon as you get up in the morning (before breakfast).  You should only be wearing your pajamas.  Write your weight on the chart/calendar.  ___ Bring your weight chart/calendar with you to all appointments.  ___ Call your doctor or nurse practitioner if you gain 2 pounds (in 1 day) or 5 pounds in (1 week) from your goal  good  weight.  Your good weight is also called your  dry  weight.  Your doctor or nurse will tell you what your good weight should be.    ___ Call your doctor or nurse practitioner if you have shortness of breath that gets worse over time, leg swelling or fatigue.       Medications and Diet       ___ Make sure to take your medication as prescribed.    ___ Bring a current list of your medication and all of your  medicine bottles with you to all appointments.    ___ Limit fluids if you still have swelling or shortness of breath, or if your doctor tells you to do so.    ___ Eat less than 2000 mg of sodium (salt) every day. Read food labels, and do not add salt to meals. Remember, if you eat less salt you retain less fluid.  ___ Follow a heart healthy diet that is low in saturated fat.        Activity and Suggested Lifestyle Changes      ___ Stay active. Talk to your doctor about an exercise program that is safe for your heart.  ___ Stop smoking. Reduce alcohol use.      ___ Lose weight if you are overweight. Extra weight puts a lot of stress on the heart.                 Control for Leg Swelling     ___ Keep your legs elevated (raised) as needed for swelling. If swelling is uncomfortable or elevation doesn t help, ask your doctor about using ACE wraps or support stockings.        What is the [facility]?  Cardiomyopathy  Optimization  Rehabilitation  Education    The [facility] is a heart failure specialty clinic within [facility].  It is an outpatient disease management program that is based on a phase-by-phase approach, which is tailored to each patient s individual needs.  The cardiologist, nurse practitioner, physician assistant and nurses provide an ongoing outpatient care and treatment plan that guides heart failure and cardiomyopathy patients from evaluation and education to stabilization. This team works with your current primary care doctor and cardiologist to help you:    - Avoid hospitalizations  - Slow the progression of the disease  - Improve length and quality of life  - Know who and when to call if heart failure symptoms appear  - Receive easy access to quality health care and advice  - Better understand your condition and treatment  - Decrease the tremendous cost burden of heart failure care  - Detect future heart problems before they become life threatening                                  "Follow-up Appointments     ___ An appointment with the [facility] may already have been made for you (see section   Your next appointments already scheduled ).  If you have a question about your appointment, would like to speak with a [facility] nurse, or would like to become a [facility] patient, please call one of the following locations:     - [facility]):                                             [phone]  - [facility]):                                            [phone]  - [facility]):                 [phone]      ___ Your [facility] Clinic Team will continue to educate you on your heart failure and may adjust medications based on your vital signs, lab work, and how you are feeling.  Therefore, it is very important to bring the following to all [facility]O.R.E[facility] appointments:    - An accurate list of your medications  - Your medicine bottles  - Your weight chart/calendar                   Pending Results     Date and Time Order Name Status Description    5/17/2018 0711 EKG 12-lead, tracing only Preliminary     5/13/2018 2315 Blood Culture ONE site Preliminary     5/13/2018 2225 Blood Culture ONE site Preliminary             Statement of Approval     Ordered          05/17/18 1132  I have reviewed and agree with all the recommendations and orders detailed in this document.  EFFECTIVE NOW     Approved and electronically signed by:  Diego Bush MD             Admission Information     Date & Time Provider Department Dept. Phone    5/13/2018 Anna Jerry MD [address] [facility] [phone]      Your Vitals Were     Blood Pressure Pulse Temperature Respirations Height Weight    144/76 (BP Location: Right arm) 65 97.6  F (36.4  C) (Oral) 18 1.651 m (5' 5\") 57.8 kg (127 lb 8 oz)    Pulse Oximetry BMI (Body Mass Index)                96% 21.22 kg/m2          MyChart Information     [employer] lets you send " "messages to your doctor, view your test results, renew your prescriptions, schedule appointments and more. To sign up, go to [email or web address] . Click on \"Log in\" on the left side of the screen, which will take you to the Welcome page. Then click on \"Sign up Now\" on the right side of the page.     You will be asked to enter the access code listed below, as well as some personal information. Please follow the directions to create your username and password.     Your access code is: [email or web address]  Expires: 2018  5:52 PM     Your access code will  in 90 days. If you need help or a new code, please call your [facility] clinic or [phone].        Care EveryWhere ID     This is your Care EveryWhere ID. This could be used by other organizations to access your [facility] medical records  [id number]        Equal Access to Services     MOHIT REMY : Morgan Rivera, wakt benites, gulshan kaalmakarissa mondragon, ana lebron . So [facility] [phone].    ATENCIÓN: Si habla español, tiene a posada disposición servicios gratuitos de asistencia lingüística. Tanjakarma al [phone].    We comply with applicable federal civil rights laws and [place] laws. We do not discriminate on the basis of race, color, national origin, age, disability, sex, sexual orientation, or gender identity.               Review of your medicines      START taking        Dose / Directions    fluticasone 50 MCG/ACT spray   Commonly known as:  FLONASE   Used for:  Viral bronchitis        Dose:  1 spray   Start taking on:  2018   Spray 1 spray into both nostrils daily   Quantity:  1 Bottle   Refills:  1         CONTINUE these medicines which may have CHANGED, or have new prescriptions. If we are uncertain of the size of tablets/capsules you have at home, strength may be listed as something that might have changed.        Dose / Directions    furosemide 20 MG tablet   Commonly known as:  LASIX   This may " have changed:    - when to take this  - reasons to take this   Used for:  Heart failure with preserved ejection fraction, NYHA class II (H)        Dose:  20 mg   Take 1 tablet (20 mg) by mouth daily as needed   Quantity:  30 tablet   Refills:  3       * sodium chloride 0.65 % nasal spray   Commonly known as:  OCEAN   This may have changed:  Another medication with the same name was changed. Make sure you understand how and when to take each.        Dose:  2 spray   Spray 2 sprays into both nostrils 2 times daily as needed for other (nasal dryness)   Refills:  0       * sodium chloride 0.65 % nasal spray   Commonly known as:  OCEAN   This may have changed:  when to take this   Used for:  Epistaxis        Dose:  1 spray   Spray 1 spray into both nostrils every 2 hours   Quantity:  1 Bottle   Refills:  0       * Notice:  This list has 2 medication(s) that are the same as other medications prescribed for you. Read the directions carefully, and ask your doctor or other care provider to review them with you.      CONTINUE these medicines which have NOT CHANGED        Dose / Directions    * ACETAMINOPHEN PO        Dose:  650 mg   Take 650 mg by mouth daily   Refills:  0       * ACETAMINOPHEN PO        Dose:  325 mg   Take 325 mg by mouth every 4 hours as needed for pain   Refills:  0       albuterol 108 (90 Base) MCG/ACT Inhaler   Commonly known as:  PROAIR HFA/PROVENTIL HFA/VENTOLIN HFA   Used for:  Viral bronchitis        Dose:  2 puff   Inhale 2 puffs into the lungs every 4 hours as needed for shortness of breath / dyspnea or wheezing   Quantity:  1 Inhaler   Refills:  1       METOPROLOL SUCCINATE ER PO        Dose:  12.5 mg   Take 12.5 mg by mouth daily   Refills:  0       PAROXETINE HCL PO        Dose:  10 mg   Take 10 mg by mouth daily   Refills:  0       VITAMIN D (CHOLECALCIFEROL) PO        Dose:  1000 Units   Take 1,000 Units by mouth daily   Refills:  0       XARELTO PO        Dose:  10 mg   Take 10 mg by mouth  daily   Refills:  0       * Notice:  This list has 2 medication(s) that are the same as other medications prescribed for you. Read the directions carefully, and ask your doctor or other care provider to review them with you.      STOP taking     azithromycin 250 MG tablet   Commonly known as:  ZITHROMAX                Where to get your medicines      These medications were sent to [facility], [place] - [address] [facility] [address]  [address], [place] [address]     Phone:  [phone]     albuterol 108 (90 Base) MCG/ACT Inhaler    fluticasone 50 MCG/ACT spray    furosemide 20 MG tablet                Protect others around you: Learn how to safely use, store and throw away your medicines at www.disposemymeds.org.             Medication List: This is a list of all your medications and when to take them. Check marks below indicate your daily home schedule. Keep this list as a reference.      Medications           Morning Afternoon Evening Bedtime As Needed    * ACETAMINOPHEN PO   Take 650 mg by mouth daily   Last time this was given:  650 mg on 5/15/2018  8:45 AM                                * ACETAMINOPHEN PO   Take 325 mg by mouth every 4 hours as needed for pain   Last time this was given:  650 mg on 5/15/2018  8:45 AM                                albuterol 108 (90 Base) MCG/ACT Inhaler   Commonly known as:  PROAIR HFA/PROVENTIL HFA/VENTOLIN HFA   Inhale 2 puffs into the lungs every 4 hours as needed for shortness of breath / dyspnea or wheezing                                fluticasone 50 MCG/ACT spray   Commonly known as:  FLONASE   Spray 1 spray into both nostrils daily   Start taking on:  5/18/2018   Last time this was given:  1 spray on 5/17/2018  8:19 AM   Next Dose Due:  5/18/18 @ 8:00am                                furosemide 20 MG tablet   Commonly known as:  LASIX   Take 1 tablet (20 mg) by mouth daily as needed   Last time this was given:  20 mg on  5/17/2018  8:18 AM                                METOPROLOL SUCCINATE ER PO   Take 12.5 mg by mouth daily   Last time this was given:  12.5 mg on 5/17/2018 12:30 AM                                PAROXETINE HCL PO   Take 10 mg by mouth daily   Last time this was given:  10 mg on 5/17/2018  8:19 AM   Next Dose Due:  5/18/2018 @ 8:00am                                * sodium chloride 0.65 % nasal spray   Commonly known as:  OCEAN   Spray 2 sprays into both nostrils 2 times daily as needed for other (nasal dryness)   Last time this was given:  2 sprays on 5/16/2018 10:33 AM                                * sodium chloride 0.65 % nasal spray   Commonly known as:  OCEAN   Spray 1 spray into both nostrils every 2 hours   Last time this was given:  2 sprays on 5/16/2018 10:33 AM                                VITAMIN D (CHOLECALCIFEROL) PO   Take 1,000 Units by mouth daily                                XARELTO PO   Take 10 mg by mouth daily   Last time this was given:  10 mg on 5/17/2018  8:19 AM   Next Dose Due:  5/18/2018 @ 8:00am                                * Notice:  This list has 4 medication(s) that are the same as other medications prescribed for you. Read the directions carefully, and ask your doctor or other care provider to review them with you.

## 2018-05-13 NOTE — IP AVS SNAPSHOT
[address] [address]    Phone:  [phone]                                       After Visit Summary   5/13/2018    Brie Gonzalez    MRN: [id number]           After Visit Summary Signature Page     I have received my discharge instructions, and my questions have been answered. I have discussed any challenges I see with this plan with the nurse or doctor.    ..........................................................................................................................................  Patient/Patient Representative Signature      ..........................................................................................................................................  Patient Representative Print Name and Relationship to Patient    ..................................................               ................................................  Date                                            Time    ..........................................................................................................................................  Reviewed by Signature/Title    ...................................................              ..............................................  Date                                                            Time

## 2018-05-14 ENCOUNTER — APPOINTMENT (OUTPATIENT)
Dept: CARDIOLOGY | Facility: CLINIC | Age: 83
DRG: 291 | End: 2018-05-14
Attending: INTERNAL MEDICINE
Payer: COMMERCIAL

## 2018-05-14 PROBLEM — J18.9 PNEUMONIA: Status: ACTIVE | Noted: 2018-05-14

## 2018-05-14 LAB
ALBUMIN UR-MCNC: 10 MG/DL
ANION GAP SERPL CALCULATED.3IONS-SCNC: 10 MMOL/L (ref 3–14)
APPEARANCE UR: CLEAR
BILIRUB UR QL STRIP: NEGATIVE
BUN SERPL-MCNC: 16 MG/DL (ref 7–30)
CALCIUM SERPL-MCNC: 8 MG/DL (ref 8.5–10.1)
CHLORIDE SERPL-SCNC: 106 MMOL/L (ref 94–109)
CO2 SERPL-SCNC: 22 MMOL/L (ref 20–32)
COLOR UR AUTO: YELLOW
CREAT SERPL-MCNC: 0.78 MG/DL (ref 0.52–1.04)
ERYTHROCYTE [DISTWIDTH] IN BLOOD BY AUTOMATED COUNT: 15.8 % (ref 10–15)
FLUAV+FLUBV RNA SPEC QL NAA+PROBE: NEGATIVE
FLUAV+FLUBV RNA SPEC QL NAA+PROBE: NEGATIVE
GFR SERPL CREATININE-BSD FRML MDRD: 69 ML/MIN/1.7M2
GLUCOSE SERPL-MCNC: 162 MG/DL (ref 70–99)
GLUCOSE UR STRIP-MCNC: NEGATIVE MG/DL
HCT VFR BLD AUTO: 33.5 % (ref 35–47)
HGB BLD-MCNC: 11.2 G/DL (ref 11.7–15.7)
HGB UR QL STRIP: ABNORMAL
INTERPRETATION ECG - MUSE: NORMAL
KETONES UR STRIP-MCNC: NEGATIVE MG/DL
L PNEUMO1 AG UR QL IA: NORMAL
LEUKOCYTE ESTERASE UR QL STRIP: NEGATIVE
MCH RBC QN AUTO: 31.1 PG (ref 26.5–33)
MCHC RBC AUTO-ENTMCNC: 33.4 G/DL (ref 31.5–36.5)
MCV RBC AUTO: 93 FL (ref 78–100)
NITRATE UR QL: NEGATIVE
NON-SQ EPI CELLS #/AREA URNS LPF: ABNORMAL /LPF
NT-PROBNP SERPL-MCNC: 4758 PG/ML (ref 0–1800)
PH UR STRIP: 6 PH (ref 5–7)
PLATELET # BLD AUTO: 131 10E9/L (ref 150–450)
POTASSIUM SERPL-SCNC: 3.8 MMOL/L (ref 3.4–5.3)
PROCALCITONIN SERPL-MCNC: 0.05 NG/ML
PROCALCITONIN SERPL-MCNC: <0.05 NG/ML
RBC # BLD AUTO: 3.6 10E12/L (ref 3.8–5.2)
RBC #/AREA URNS AUTO: 0 /HPF
RSV RNA SPEC NAA+PROBE: NEGATIVE
S PNEUM AG SPEC QL: NORMAL
S PNEUM AG SPEC QL: NORMAL
SODIUM SERPL-SCNC: 137 MMOL/L (ref 133–144)
SOURCE: ABNORMAL
SP GR UR STRIP: 1.01 (ref 1–1.03)
SPECIMEN SOURCE: NORMAL
TROPONIN I SERPL-MCNC: 0.3 UG/L (ref 0–0.04)
TROPONIN I SERPL-MCNC: 0.35 UG/L (ref 0–0.04)
UROBILINOGEN UR STRIP-MCNC: NORMAL MG/DL (ref 0–2)
WBC # BLD AUTO: 7.9 10E9/L (ref 4–11)
WBC #/AREA URNS AUTO: 0 /HPF

## 2018-05-14 PROCEDURE — 87899 AGENT NOS ASSAY W/OPTIC: CPT | Performed by: HOSPITALIST

## 2018-05-14 PROCEDURE — 25000128 H RX IP 250 OP 636: Performed by: EMERGENCY MEDICINE

## 2018-05-14 PROCEDURE — 25000125 ZZHC RX 250: Performed by: EMERGENCY MEDICINE

## 2018-05-14 PROCEDURE — 25000132 ZZH RX MED GY IP 250 OP 250 PS 637: Performed by: HOSPITALIST

## 2018-05-14 PROCEDURE — 81001 URINALYSIS AUTO W/SCOPE: CPT | Performed by: EMERGENCY MEDICINE

## 2018-05-14 PROCEDURE — 12000001 ZZH R&B MED SURG/OB UMMC

## 2018-05-14 PROCEDURE — 25000125 ZZHC RX 250: Performed by: STUDENT IN AN ORGANIZED HEALTH CARE EDUCATION/TRAINING PROGRAM

## 2018-05-14 PROCEDURE — 94640 AIRWAY INHALATION TREATMENT: CPT

## 2018-05-14 PROCEDURE — 94640 AIRWAY INHALATION TREATMENT: CPT | Mod: 76 | Performed by: OPTOMETRIST

## 2018-05-14 PROCEDURE — 25500064 ZZH RX 255 OP 636: Performed by: HOSPITALIST

## 2018-05-14 PROCEDURE — 80048 BASIC METABOLIC PNL TOTAL CA: CPT | Performed by: HOSPITALIST

## 2018-05-14 PROCEDURE — 87086 URINE CULTURE/COLONY COUNT: CPT | Performed by: EMERGENCY MEDICINE

## 2018-05-14 PROCEDURE — 87631 RESP VIRUS 3-5 TARGETS: CPT | Performed by: INTERNAL MEDICINE

## 2018-05-14 PROCEDURE — 36415 COLL VENOUS BLD VENIPUNCTURE: CPT | Performed by: HOSPITALIST

## 2018-05-14 PROCEDURE — 87633 RESP VIRUS 12-25 TARGETS: CPT | Performed by: HOSPITALIST

## 2018-05-14 PROCEDURE — 94640 AIRWAY INHALATION TREATMENT: CPT | Performed by: EMERGENCY MEDICINE

## 2018-05-14 PROCEDURE — 25000128 H RX IP 250 OP 636: Performed by: STUDENT IN AN ORGANIZED HEALTH CARE EDUCATION/TRAINING PROGRAM

## 2018-05-14 PROCEDURE — 85027 COMPLETE CBC AUTOMATED: CPT | Performed by: HOSPITALIST

## 2018-05-14 PROCEDURE — 99207 ZZC NO CHARGE VISIT/PATIENT NOT SEEN: CPT | Performed by: INTERNAL MEDICINE

## 2018-05-14 PROCEDURE — 96367 TX/PROPH/DG ADDL SEQ IV INF: CPT | Performed by: EMERGENCY MEDICINE

## 2018-05-14 PROCEDURE — 93306 TTE W/DOPPLER COMPLETE: CPT | Mod: 26 | Performed by: INTERNAL MEDICINE

## 2018-05-14 PROCEDURE — 84145 PROCALCITONIN (PCT): CPT | Performed by: HOSPITALIST

## 2018-05-14 PROCEDURE — 84484 ASSAY OF TROPONIN QUANT: CPT | Performed by: HOSPITALIST

## 2018-05-14 PROCEDURE — 40000275 ZZH STATISTIC RCP TIME EA 10 MIN: Performed by: OPTOMETRIST

## 2018-05-14 PROCEDURE — 96365 THER/PROPH/DIAG IV INF INIT: CPT | Performed by: EMERGENCY MEDICINE

## 2018-05-14 PROCEDURE — 40000264 ECHO COMPLETE WITH OPTISON

## 2018-05-14 PROCEDURE — 25000128 H RX IP 250 OP 636: Performed by: HOSPITALIST

## 2018-05-14 PROCEDURE — 25000132 ZZH RX MED GY IP 250 OP 250 PS 637: Performed by: INTERNAL MEDICINE

## 2018-05-14 PROCEDURE — 25000132 ZZH RX MED GY IP 250 OP 250 PS 637: Performed by: STUDENT IN AN ORGANIZED HEALTH CARE EDUCATION/TRAINING PROGRAM

## 2018-05-14 PROCEDURE — 99223 1ST HOSP IP/OBS HIGH 75: CPT | Mod: AI | Performed by: INTERNAL MEDICINE

## 2018-05-14 PROCEDURE — 96375 TX/PRO/DX INJ NEW DRUG ADDON: CPT | Performed by: EMERGENCY MEDICINE

## 2018-05-14 RX ORDER — PAROXETINE 10 MG/1
10 TABLET, FILM COATED ORAL DAILY
Status: DISCONTINUED | OUTPATIENT
Start: 2018-05-14 | End: 2018-05-17 | Stop reason: HOSPADM

## 2018-05-14 RX ORDER — FUROSEMIDE 10 MG/ML
40 INJECTION INTRAMUSCULAR; INTRAVENOUS ONCE
Status: COMPLETED | OUTPATIENT
Start: 2018-05-14 | End: 2018-05-14

## 2018-05-14 RX ORDER — PAROXETINE 10 MG/1
10 TABLET, FILM COATED ORAL DAILY
Status: DISCONTINUED | OUTPATIENT
Start: 2018-05-14 | End: 2018-05-14

## 2018-05-14 RX ORDER — ACETAMINOPHEN 325 MG/1
650 TABLET ORAL EVERY 4 HOURS PRN
Status: DISCONTINUED | OUTPATIENT
Start: 2018-05-14 | End: 2018-05-17 | Stop reason: HOSPADM

## 2018-05-14 RX ORDER — CEFAZOLIN SODIUM 1 G/50ML
1250 SOLUTION INTRAVENOUS EVERY 24 HOURS
Status: DISCONTINUED | OUTPATIENT
Start: 2018-05-14 | End: 2018-05-15

## 2018-05-14 RX ORDER — FUROSEMIDE 40 MG
40 TABLET ORAL DAILY
Status: DISCONTINUED | OUTPATIENT
Start: 2018-05-14 | End: 2018-05-16

## 2018-05-14 RX ORDER — ONDANSETRON 2 MG/ML
4 INJECTION INTRAMUSCULAR; INTRAVENOUS EVERY 6 HOURS PRN
Status: DISCONTINUED | OUTPATIENT
Start: 2018-05-14 | End: 2018-05-17 | Stop reason: HOSPADM

## 2018-05-14 RX ORDER — IPRATROPIUM BROMIDE AND ALBUTEROL SULFATE 2.5; .5 MG/3ML; MG/3ML
3 SOLUTION RESPIRATORY (INHALATION) ONCE
Status: COMPLETED | OUTPATIENT
Start: 2018-05-14 | End: 2018-05-14

## 2018-05-14 RX ORDER — IPRATROPIUM BROMIDE AND ALBUTEROL SULFATE 2.5; .5 MG/3ML; MG/3ML
3 SOLUTION RESPIRATORY (INHALATION) 4 TIMES DAILY
Status: DISCONTINUED | OUTPATIENT
Start: 2018-05-14 | End: 2018-05-16

## 2018-05-14 RX ORDER — SIMVASTATIN 20 MG
20 TABLET ORAL AT BEDTIME
Status: DISCONTINUED | OUTPATIENT
Start: 2018-05-14 | End: 2018-05-14

## 2018-05-14 RX ORDER — FUROSEMIDE 10 MG/ML
20 INJECTION INTRAMUSCULAR; INTRAVENOUS ONCE
Status: DISCONTINUED | OUTPATIENT
Start: 2018-05-14 | End: 2018-05-14

## 2018-05-14 RX ORDER — ONDANSETRON 4 MG/1
4 TABLET, ORALLY DISINTEGRATING ORAL EVERY 6 HOURS PRN
Status: DISCONTINUED | OUTPATIENT
Start: 2018-05-14 | End: 2018-05-17 | Stop reason: HOSPADM

## 2018-05-14 RX ORDER — ALBUTEROL SULFATE 0.83 MG/ML
3 SOLUTION RESPIRATORY (INHALATION)
Status: DISCONTINUED | OUTPATIENT
Start: 2018-05-14 | End: 2018-05-16

## 2018-05-14 RX ORDER — NALOXONE HYDROCHLORIDE 0.4 MG/ML
.1-.4 INJECTION, SOLUTION INTRAMUSCULAR; INTRAVENOUS; SUBCUTANEOUS
Status: DISCONTINUED | OUTPATIENT
Start: 2018-05-14 | End: 2018-05-17 | Stop reason: HOSPADM

## 2018-05-14 RX ORDER — METOPROLOL TARTRATE 50 MG
50 TABLET ORAL DAILY
Status: DISCONTINUED | OUTPATIENT
Start: 2018-05-14 | End: 2018-05-15

## 2018-05-14 RX ORDER — ASPIRIN 81 MG/1
81 TABLET ORAL DAILY
Status: DISCONTINUED | OUTPATIENT
Start: 2018-05-15 | End: 2018-05-15

## 2018-05-14 RX ORDER — ASPIRIN 81 MG/1
81 TABLET, CHEWABLE ORAL EVERY OTHER DAY
Status: DISCONTINUED | OUTPATIENT
Start: 2018-05-14 | End: 2018-05-14

## 2018-05-14 RX ADMIN — AZITHROMYCIN MONOHYDRATE 500 MG: 500 INJECTION, POWDER, LYOPHILIZED, FOR SOLUTION INTRAVENOUS at 01:30

## 2018-05-14 RX ADMIN — CEFEPIME HYDROCHLORIDE 2 G: 2 INJECTION, POWDER, FOR SOLUTION INTRAVENOUS at 08:08

## 2018-05-14 RX ADMIN — IPRATROPIUM BROMIDE AND ALBUTEROL SULFATE 3 ML: .5; 3 SOLUTION RESPIRATORY (INHALATION) at 20:22

## 2018-05-14 RX ADMIN — IPRATROPIUM BROMIDE AND ALBUTEROL SULFATE 3 ML: .5; 3 SOLUTION RESPIRATORY (INHALATION) at 02:46

## 2018-05-14 RX ADMIN — METOPROLOL TARTRATE 50 MG: 50 TABLET, FILM COATED ORAL at 08:00

## 2018-05-14 RX ADMIN — CEFEPIME HYDROCHLORIDE 2 G: 2 INJECTION, POWDER, FOR SOLUTION INTRAVENOUS at 00:34

## 2018-05-14 RX ADMIN — SODIUM CHLORIDE, POTASSIUM CHLORIDE, SODIUM LACTATE AND CALCIUM CHLORIDE 1000 ML: 600; 310; 30; 20 INJECTION, SOLUTION INTRAVENOUS at 00:34

## 2018-05-14 RX ADMIN — VANCOMYCIN HYDROCHLORIDE 1250 MG: 10 INJECTION, POWDER, LYOPHILIZED, FOR SOLUTION INTRAVENOUS at 06:35

## 2018-05-14 RX ADMIN — RIVAROXABAN 10 MG: 10 TABLET, FILM COATED ORAL at 08:00

## 2018-05-14 RX ADMIN — IPRATROPIUM BROMIDE AND ALBUTEROL SULFATE 3 ML: .5; 3 SOLUTION RESPIRATORY (INHALATION) at 12:48

## 2018-05-14 RX ADMIN — HUMAN ALBUMIN MICROSPHERES AND PERFLUTREN 6 ML: 10; .22 INJECTION, SOLUTION INTRAVENOUS at 10:57

## 2018-05-14 RX ADMIN — IOPAMIDOL 53 ML: 755 INJECTION, SOLUTION INTRAVENOUS at 00:06

## 2018-05-14 RX ADMIN — SODIUM CHLORIDE, PRESERVATIVE FREE 85 ML: 5 INJECTION INTRAVENOUS at 00:14

## 2018-05-14 RX ADMIN — FUROSEMIDE 40 MG: 10 INJECTION, SOLUTION INTRAVENOUS at 07:15

## 2018-05-14 RX ADMIN — FUROSEMIDE 40 MG: 40 TABLET ORAL at 18:19

## 2018-05-14 RX ADMIN — IPRATROPIUM BROMIDE AND ALBUTEROL SULFATE 3 ML: .5; 3 SOLUTION RESPIRATORY (INHALATION) at 16:25

## 2018-05-14 RX ADMIN — IPRATROPIUM BROMIDE AND ALBUTEROL SULFATE 3 ML: .5; 3 SOLUTION RESPIRATORY (INHALATION) at 07:09

## 2018-05-14 RX ADMIN — CEFEPIME HYDROCHLORIDE 2 G: 2 INJECTION, POWDER, FOR SOLUTION INTRAVENOUS at 20:13

## 2018-05-14 RX ADMIN — PAROXETINE 10 MG: 10 TABLET, FILM COATED ORAL at 09:14

## 2018-05-14 ASSESSMENT — ACTIVITIES OF DAILY LIVING (ADL)
BATHING: 2-->ASSISTIVE PERSON
SWALLOWING: 0-->SWALLOWS FOODS/LIQUIDS WITHOUT DIFFICULTY
RETIRED_COMMUNICATION: 0-->UNDERSTANDS/COMMUNICATES WITHOUT DIFFICULTY
TRANSFERRING: 0-->INDEPENDENT
DRESS: 2-->ASSISTIVE PERSON
FALL_HISTORY_WITHIN_LAST_SIX_MONTHS: NO
RETIRED_EATING: 0-->INDEPENDENT
TOILETING: 1-->ASSISTIVE EQUIPMENT
AMBULATION: 0-->INDEPENDENT
COGNITION: 0 - NO COGNITION ISSUES REPORTED

## 2018-05-14 NOTE — PLAN OF CARE
Problem: Patient Care Overview  Goal: Plan of Care/Patient Progress Review  Outcome: No Change  Pt A&O x4, forgetful at times, VS stable on 3L NC. Up SBA, BA on for safety, unsteady on feet, can be impulsive to get up. Voiding spontaneously, no BM this shift. Droplet precautions for RSV rule out. Dry cough; non productive, needs sputum sample. On strict I/Os. Echo done in ED, trops decreased. Fair appetite, ate some soup this afternoon, encouraging fluid intake. Reminding pt to call for help when needing to get up. Pt's daughter at bedside. Continue to monitor and update MD with changes.

## 2018-05-14 NOTE — SUMMARY OF CARE
Patient arrived to floor at 1200. Pt ambulatory and VS stable on arrival. Belongings include: glasses, red shirt and red pants, 2 gold rings, brown wristwatch. Pt's daughter took home the red shirt and red pants, all other belongings kept with patient.    Addendum: Pt believes her tan, knee-high compression stockings were left down in the ED before admission. Called to ED and they did not find the stockings, the staff taking care of her stated they don't recall patient wearing them. Family will check nursing facility.

## 2018-05-14 NOTE — ED NOTES
Pt c/o sob and difficulty speaking.  O2 sat 94% with 3LNC.  MD Erickson called to bedside.  Duoneb and lasix given.  Signed off to PATITO Cloud.

## 2018-05-14 NOTE — PHARMACY-VANCOMYCIN DOSING SERVICE
Pharmacy Vancomycin Initial Note  Date of Service May 14, 2018  Patient's  1925  89 year old, female    Indication: [facility]ated Pneumonia    Current estimated CrCl = Estimated Creatinine Clearance: 40.3 mL/min (based on Cr of 0.86).    Creatinine for last 3 days  2018: 10:22 PM Creatinine 0.86 mg/dL    Recent Vancomycin Level(s) for last 3 days  No results found for requested labs within last 72 hours.      Vancomycin IV Administrations (past 72 hours)      No vancomycin orders with administrations in past 72 hours.                Nephrotoxins and other renal medications     None          Contrast Orders - past 72 hours (72h ago through future)    Start     Dose/Rate Route Frequency Ordered Stop    18  iopamidol (ISOVUE-370) solution 53 mL      53 mL Intravenous ONCE 185 18 0006                Plan:  1.  Start vancomycin  1250 mg IV q24h.   2.  Goal Trough Level: 15-20 mg/L   3.  Pharmacy will check trough levels as appropriate in 1-3 Days.    4. Serum creatinine levels will be ordered daily for the first week of therapy and at least twice weekly for subsequent weeks.    5. [place] method utilized to dose vancomycin therapy: Method 2    Ap Damico

## 2018-05-14 NOTE — PROGRESS NOTES
[facility]   [place] [facility]  Daily Progress Note    Main Plans for Today   - Continue IV abx.   - Viral swab.     - Duonebs QID while awake.      Assessment & Plan   Brie Gonzalez is a 89 year old female admitted on 5/13/2018. She has a history of atrial fibrillation on Rivaroxaban, CVA and is admitted for cough, shortness of breath concerning for pneumonia    Acute hypoxic respiratory failure:  Severe Pneumonia  Health-care associated pneumonia - lives in an assisted living facility. Possible superimposed bacterial infection with initial viral syndrome. CT PE negative on admission.   - Respiratory panel, Influenza, RSV pending.    - Started on Cefepime, Azithromycin, and Vancomycin on admission.    - WBC 10, procal negative  - CXR with retrocardiac opacity. Bedside US with normal LVEF, no WMA, no chest pain on admission. NTBNP elevated to 4700.      Elevated troponin  NSTEMI - type II Demand in the setting of hypoxia.    Atrial fibrillation/flutter:   - Bedside echocardiogram with normal LVEF, no WMA.  Formal echocardiogram pending.   - Troponin peaked at 0.3. Likely demand in the setting of respiratory illness. No recent ischemic evaluation, likely warrants as an outpatient when improved from current respiratory process. Was seen by Cardiology on 03/2018, will arrange for outpatient evaluation.    - Continue Rivaroxaban and Metoprolol  - Trend troponin. Currently asymptomatic.      Chronic Medical conditions:   CVA- ASA 81mg.    Anxiety: Lorazepam PRN for sleep, Continue Paroxetine  HLD: continue statin.       # Pain Assessment:  Current Pain Score 12/31/2017   Patient currently in pain? denies   Brie s pain level was assessed and she currently denies pain.      Diet: Combination Diet Regular Diet Adult  Fluids: PRN  DVT Prophylaxis: Anticoagulated   Code Status: DNR / DNI    Disposition Plan   Expected discharge: 2 - 3 days, recommended to prior living arrangement once antibiotic plan established and  O2 use less than 1 liters/minute.     Entered: Diegomariama Bush 05/14/2018, 7:23 AM   Information in the above section will display in the discharge planner report.      The patient's care was discussed with the Attending Physician, Dr. Cortez.    Diegocici Bush  [facility]  Pager: [phone]  Please see sticky note for cross cover information    Interval History   No acute events overnight. Remained in the ED, received IV abx. Shortness of breath is improved. Oxygen needs decreased from 6 liters to 3 liters. Continues to have SOB with exertional. Productive cough. No nausea, vomiting, diarrhea.      Physical Exam   Vital Signs: Temp: 100.5  F (38.1  C) Temp src: Oral BP: 143/85 Pulse: 119 Heart Rate: 69 Resp: 27 SpO2: 97 % O2 Device: Nasal cannula (3L) Oxygen Delivery: 6 LPM  Weight: 135 lbs 0 oz  General Appearance: Elderly female, resting in bed, mild respiratory discomfort, no acute distress.    Respiratory: Scattered wheezes and rhonchi.   Cardiovascular: RRR  GI: Soft, non-tender, NA bowel sounds.    Skin: Scattered ecchymosis, no rashes.    Other: Grossly non focal neurologic exam. Moving all extremities. Strength is grossly symmetric and normal Sensation is intact. CN II-XII intact. Oriented x 4.           Data   Medications     - MEDICATION INSTRUCTIONS -         ceFEPIme (MAXIPIME) IV  2 g Intravenous Q8H     ipratropium - albuterol 0.5 mg/2.5 mg/3 mL  3 mL Nebulization 4x Daily     metoprolol tartrate  50 mg Oral Daily     PARoxetine  10 mg Oral Daily     rivaroxaban ANTICOAGULANT  10 mg Oral Daily     vancomycin (VANCOCIN) IV  1,250 mg Intravenous Q24H     Data     Recent Labs  Lab 05/14/18  1028 05/14/18  0534 05/13/18  2222   WBC  --  7.9 10.3   HGB  --  11.2* 13.4   MCV  --  93 93   PLT  --  131* 161   NA  --  137 135   POTASSIUM  --  3.8 3.9   CHLORIDE  --  106 101   CO2  --  22 27   BUN  --  16 18   CR  --  0.78 0.86   ANIONGAP  --  10 7   THIERRY  --  8.0* 9.2   GLC  --   162* 136*   ALBUMIN  --   --  4.1   PROTTOTAL  --   --  7.6   BILITOTAL  --   --  1.3   ALKPHOS  --   --  95   ALT  --   --  25   AST  --   --  28   TROPI 0.299* 0.355* 0.240*     Recent Results (from the past 24 hour(s))   Chest  XR, 1 view portable    Narrative    XR CHEST PORT 1 VW 5/13/2018 10:35 PM    CLINICAL HISTORY: sob;     COMPARISON: 3/3/2018    FINDINGS: Cardiomediastinal silhouette is within normal limits.  Bilateral calcified pleural plaques are present and appear unchanged.  Increased left retrocardiac and left basilar pulmonary opacity. Small  left pleural effusion. No pneumothorax.      Impression    IMPRESSION:  1. Increased left retrocardiac and left basilar pulmonary opacities  may represent atelectasis or infection.  2. Stable small left pleural effusion/blunting of the costophrenic  angle.    I have personally reviewed the examination and initial interpretation  and I agree with the findings.    ANNA ROD MD   Chest CT, IV contrast only - PE protocol    Narrative    Examination:  TEMPORARY 5/14/2018 12:22 AM     Comparison: None.    History: Fever, dyspnea and hypoxia. Possible pneumonia versus PE.    TECHNIQUE: Volumetric helical acquisition of CT images of the chest  from the lung apices to the kidneys were acquired in arterial phase  after the administration of IV contrast. Contrast: 53 cc of  Isovue-370. Three-dimensional (3D) post-processed angiographic images  were reconstructed, archived to PACS and used in the interpretation of  this study.    FINDINGS:    Chest:    There is adequate opacification of the main and lobar pulmonary  arteries. No filling defect in the lobar and main segmental pulmonary  arteries to suggest pulmonary embolism. Right ventricle is enlarged  relative to the left ventricle. Right atrium is notably enlarged.  There is reflux of contrast into the hepatic veins.    Heart is enlarged. No pericardial effusion. Mediastinal adenopathy.  Bilateral hilar soft tissue  thickening. Scattered bilateral  groundglass nodules for example in the right upper lobe series 9 image  93. Consolidative and groundglass nodularity at the lung bases  bilaterally, left greater than right. Small right pleural effusion.  Moderate left pleural effusion. Calcified pleural plaques. Simple cyst  in right lobe of liver. Upper abdomen is otherwise unremarkable.    Bones and soft tissues: Degenerative changes of the spine. No  aggressive osseous lesions.      Impression    IMPRESSION:   1. No pulmonary embolism.  2. Persistent versus increased right atrial enlargement with signs of  right heart dysfunction.  3. Scattered groundglass and consolidative opacities most predominant  at the left base with pleural effusions left greater than right.  Although could be compressive atelectasis or pneumonia would still be  in the differential.    I have personally reviewed the examination and initial interpretation  and I agree with the findings.    BEVERLY GARCIA MD

## 2018-05-14 NOTE — ED TRIAGE NOTES
biba from assisted living  SOB 2 weeks in duration  Dx with pneumonia, abx started today  C/o SOB, fever 105, tremors, rhonchi  911 called by staff after seeing fever and tremors  Found 83% on RA  Given 2 albuterol and 1 atrovent en route arrived on simple face mask 6LPM 99% oxygen saturation  Arrives A&O, tachycardic, skin warm and dry, states improvement in dyspnea, able to speak in full sentences before needing a breath

## 2018-05-14 NOTE — ED PROVIDER NOTES
History     Chief Complaint   Patient presents with     Shortness of Breath     HPI  Brie Gonzalez is a 89 year old female with a history of atrial fibrillation, CVA, HTN, anxiety and depression who presents from her assisted living facility via EMS for evaluation of fever and shortness of breath. Per EMS report, staff at the patient's assisted living facility called 911 after finding the patient was febrile with tremors and hypoxia with RA oxygen saturations around 83%. Patient was given 2 albuterol nebs and 1 Atrovent nebulizer treatment en route by EMS, and on arrival was sating 99% on 6 L by simple face mask. Here in the ED, the patient complains of shortness of breath that has been gradually worsening over the past 2-3 weeks. Last night she had the onset of a productive cough with yellow sputum, and then this evening she developed dizziness which was new for her. Per report from patient's assisted living, patient was also found to be febrile with an intiial temperature of 105  F. This down-trended to 100.5  F by the time the patient arrived in the ED. She did not take any Tylenol for her fever prior to arrival. She denies chest pain, vomiting, diarrhea, sore throat, headache, or vision changes. No dysuria, hematuria, or frequency. No rash or skin changes. She was diagnosed with pneumonia, and was started on a course of antibiotics (azithromycin) this morning. She is anticoagulated on Xarelto. She is allergic to penicillin. No known history of asthma, COPD, or pulmonary disease. Patient is DNR/DNI.     I have reviewed the Medications, Allergies, Past Medical and Surgical History, and Social History in the [facility] system.  Past Medical History:   Diagnosis Date     Anxiety 2.5 years ago     Arthritis Hands for years     Atrial fibrillation (H)      Cerebral infarction (H) 4-5 years ago     Depressive disorder 2.5 years ago intermittent     Hypertension 4-5 years ago       Past Surgical History:   Procedure  "Laterality Date     ADENOIDECTOMY  1930's     GYN SURGERY   1970's uterine fibroids     TONSILLECTOMY  1930's       Family History   Problem Relation Age of Onset     Dementia Mother      CEREBROVASCULAR DISEASE Mother        Social History   Substance Use Topics     Smoking status: Never Smoker     Smokeless tobacco: Never Used     Alcohol use Yes      Comment: Rare       No current facility-administered medications for this encounter.      Current Outpatient Prescriptions   Medication     ACETAMINOPHEN PO     albuterol (PROAIR HFA/PROVENTIL HFA/VENTOLIN HFA) 108 (90 BASE) MCG/ACT Inhaler     ASPIRIN PO     azithromycin (ZITHROMAX) 250 MG tablet     calcium-vitamin D (CALTRATE) 600-400 MG-UNIT per tablet     furosemide (LASIX) 20 MG tablet     LORAZEPAM PO     metoprolol (LOPRESSOR) 25 MG tablet     METOPROLOL SUCCINATE ER PO     oxymetazoline (AFRIN) 0.05 % spray     PAROXETINE HCL PO     Rivaroxaban (XARELTO PO)     SIMVASTATIN PO     sodium chloride (OCEAN) 0.65 % nasal spray     VITAMIN D, CHOLECALCIFEROL, PO        Allergies   Allergen Reactions     Penicillins Swelling       Review of Systems   Constitutional: Positive for fever.   HENT: Negative for sore throat.    Eyes: Negative for visual disturbance.   Respiratory: Positive for cough (productive w/yellow sputum) and shortness of breath.    Cardiovascular: Negative for chest pain.   Gastrointestinal: Negative for abdominal pain, nausea and vomiting.   Genitourinary: Negative for dysuria, frequency and hematuria.   Skin: Negative for color change and rash.   Neurological: Positive for dizziness. Negative for headaches.   All other systems reviewed and are negative.      Physical Exam   BP: (!) 169/105  Pulse: 119  Heart Rate: 106  Temp: 100.5  F (38.1  C)  Resp: 30  Height: 165.1 cm (5' 5\")  Weight: 61.2 kg (135 lb)  SpO2: 95 %      Physical Exam  Gen:A&Ox3  HEENT:PERRL, no facial tenderness or wounds, head atraumatic, oropharynx clear, mucous membranes " moist, TMs clear bilaterally  Neck:no bony tenderness or step offs, no JVD, trachea midline  Back: no CVA tenderness, no midline bony tenderness  CV: tachycardia, irregular rhythm  PULM: wheezing and coarse breath sounds bilaterally   Abd:soft, nontender, nondistended. Bowel sounds present and normal  UE:No traumatic injuries, skin normal  LE:no traumatic injuries, skin normal, no LE edema.   Neuro:CN II-XII intact, strength 5/5 throughout  Skin: no rashes or ecchymoses    ED Course     ED Course     Procedures       11:10 PM  The patient was seen and examined by Dr. De La Torre in Room 8.          EKG Interpretation:      Interpreted by Mariana De La Torre  Time reviewed: 23:00  Symptoms at time of EKG: dyspnea   Rhythm: atrial flutter   Rate: 114  Axis: left  Ectopy: none  Conduction: normal  ST Segments/ T Waves: No ST-T wave changes  Q Waves: none  Comparison to prior: Unchanged     Clinical Impression: atrial flutter        Critical Care time:  none             Labs Ordered and Resulted from Time of ED Arrival Up to the Time of Departure from the ED   CBC WITH PLATELETS DIFFERENTIAL - Abnormal; Notable for the following:        Result Value    RDW 15.9 (*)     All other components within normal limits   COMPREHENSIVE METABOLIC PANEL - Abnormal; Notable for the following:     Glucose 136 (*)     All other components within normal limits   TROPONIN I - Abnormal; Notable for the following:     Troponin I ES 0.240 (*)     All other components within normal limits   ROUTINE UA WITH MICROSCOPIC - Abnormal; Notable for the following:     Blood Urine Moderate (*)     Protein Albumin Urine 10 (*)     All other components within normal limits   NT PROBNP INPATIENT - Abnormal; Notable for the following:     N-Terminal Pro BNP Inpatient 4758 (*)     All other components within normal limits   ISTAT  GASES LACTATE DARRELL POCT - Abnormal; Notable for the following:     Bicarbonate Venous 30 (*)     All other components within normal  limits   PROCALCITONIN   ISTAT CG4 GASES LACTATE DARRELL NURSING POCT   BLOOD CULTURE   BLOOD CULTURE   URINE CULTURE AEROBIC BACTERIAL            Assessments & Plan (with Medical Decision Making)   89 yo F with a hx of chronic atrial fibrillation presenting with fever, cough and dyspnea.   Hypoxia to 80s on EMS arrival at her care facility.   Arrived with fever, tachypnea, tachycardia and O2 requirement.   Treated with nebulizer for wheezing.   EKG showing atrial flutter without ischemic changes. Monitored on telemetry, atrial flutter persisting.   IV access obtained and lab testing done.   CBC with WBC of 10.3. Stable Hgb and ptls.   CMP unremarkable.   UA unremarkable.   Lactic acid 1.7.   Troponin elevated to 0.24. BNP 4758. Pt is without chest pain or EKG changes. Continue anticoagulation with Xarelto. Discussed with Cardiology fellow and bedside echo done. EF unchanged. No wall motion abnormalities noted. No signs of right heart strain.   CT PE protocol negative for PE. Shows patchy consolidation consistent with infection.   Pt was started on IV abx with cefepime and azithromycin. Hx of PCN allergy.   Admitted to the IM service for further care.       I have reviewed the nursing notes.    I have reviewed the findings, diagnosis, plan and need for follow up with the patient.    New Prescriptions    No medications on file       Final diagnoses:   Acute respiratory failure with hypoxia (H)   Pneumonia of left lower lobe due to infectious organism (H)   NSTEMI (non-ST elevated myocardial infarction) (H)   I, Shayna Zapata, am serving as a trained medical scribe to document services personally performed by Mariana De La Torre MD, based on the provider's statements to me.   IMariana MD, was physically present and have reviewed and verified the accuracy of this note documented by Shayna Zapata.      5/13/2018   [facility], EMERGENCY DEPARTMENT    MD CLAUDIA Yarbrough Katrina Anne,  MD  05/14/18 0303

## 2018-05-14 NOTE — ED NOTES
[facility], [place]   ED Nurse to Floor Handoff     Brie Gonzalez is a 89 year old female who speaks English and lives alone,  in a group home  They arrived in the ED by ambulance from home    ED Chief Complaint: Shortness of Breath    ED Dx;   Final diagnoses:   Acute respiratory failure with hypoxia (H)   Pneumonia of left lower lobe due to infectious organism (H)   NSTEMI (non-ST elevated myocardial infarction) (H)         Needed?: No    Allergies:   Allergies   Allergen Reactions     Penicillins Swelling   .  Past Medical Hx:   Past Medical History:   Diagnosis Date     Anxiety 2.5 years ago     Arthritis Hands for years     Atrial fibrillation (H)      Cerebral infarction (H) 4-5 years ago     Depressive disorder 2.5 years ago intermittent     Hypertension 4-5 years ago      Baseline Mental status: WDL  Current Mental Status changes: at basesline    Infection present or suspected this encounter: no  Sepsis suspected: No  Isolation type: No active isolations     Activity level - Baseline/Home:  Independent  Activity Level - Current:   Independent    Bariatric equipment needed?: No    In the ED these meds were given:   Medications   metoprolol tartrate (LOPRESSOR) tablet 50 mg (not administered)   rivaroxaban ANTICOAGULANT (XARELTO) tablet 10 mg (not administered)   ceFEPIme (MAXIPIME) 2 g vial to attach to  ml bag for ADULTS or 50 ml bag for PEDS (not administered)   ipratropium - albuterol 0.5 mg/2.5 mg/3 mL (DUONEB) neb solution 3 mL (3 mLs Nebulization Given 5/14/18 0709)   albuterol neb solution 2.5 mg (not administered)   naloxone (NARCAN) injection 0.1-0.4 mg (not administered)   melatonin tablet 1 mg (not administered)   Patient is already receiving anticoagulation with heparin, enoxaparin (LOVENOX), warfarin (COUMADIN)  or other anticoagulant medication (not administered)   acetaminophen (TYLENOL) tablet 650 mg (not administered)   ondansetron (ZOFRAN-ODT) ODT  tab 4 mg (not administered)     Or   ondansetron (ZOFRAN) injection 4 mg (not administered)   vancomycin (VANCOCIN) 1,250 mg in sodium chloride 0.9 % 250 mL intermittent infusion (1,250 mg Intravenous New Bag 5/14/18 0635)   ceFEPIme (MAXIPIME) 2 g vial to attach to  ml bag for ADULTS or 50 ml bag for PEDS (0 g Intravenous Stopped 5/14/18 0105)   azithromycin (ZITHROMAX) 500 mg in sodium chloride 0.9 % 250 mL intermittent infusion (0 mg Intravenous Stopped 5/14/18 0236)   iopamidol (ISOVUE-370) solution 53 mL (53 mLs Intravenous Given 5/14/18 0006)   sodium chloride 0.9 % bag 500mL for CT scan flush use (85 mLs Intravenous Given 5/14/18 0014)   lactated ringers BOLUS 1,000 mL (0 mLs Intravenous Stopped 5/14/18 0228)   ipratropium - albuterol 0.5 mg/2.5 mg/3 mL (DUONEB) neb solution 3 mL (3 mLs Nebulization Given 5/14/18 0246)   furosemide (LASIX) injection 40 mg (40 mg Intravenous Given 5/14/18 0715)       Drips running?  No    Home pump  No    Current LDAs  Peripheral IV 05/13/18 Right (Active)   Number of days:1       Peripheral IV 05/13/18 Left Upper forearm (Active)   Number of days:1       Labs results:   Labs Ordered and Resulted from Time of ED Arrival Up to the Time of Departure from the ED   CBC WITH PLATELETS DIFFERENTIAL - Abnormal; Notable for the following:        Result Value    RDW 15.9 (*)     All other components within normal limits   COMPREHENSIVE METABOLIC PANEL - Abnormal; Notable for the following:     Glucose 136 (*)     All other components within normal limits   TROPONIN I - Abnormal; Notable for the following:     Troponin I ES 0.240 (*)     All other components within normal limits   ROUTINE UA WITH MICROSCOPIC - Abnormal; Notable for the following:     Blood Urine Moderate (*)     Protein Albumin Urine 10 (*)     All other components within normal limits   NT PROBNP INPATIENT - Abnormal; Notable for the following:     N-Terminal Pro BNP Inpatient 4758 (*)     All other components  within normal limits   BASIC METABOLIC PANEL - Abnormal; Notable for the following:     Glucose 162 (*)     Calcium 8.0 (*)     All other components within normal limits   CBC WITH PLATELETS - Abnormal; Notable for the following:     RBC Count 3.60 (*)     Hemoglobin 11.2 (*)     Hematocrit 33.5 (*)     RDW 15.8 (*)     Platelet Count 131 (*)     All other components within normal limits   TROPONIN I - Abnormal; Notable for the following:     Troponin I ES 0.355 (*)     All other components within normal limits   ISTAT  GASES LACTATE DARRELL POCT - Abnormal; Notable for the following:     Bicarbonate Venous 30 (*)     All other components within normal limits   PROCALCITONIN   PROCALCITONIN   LEGIONELLA PNEUMONIA ANTIGEN URINE   ISTAT CG4 GASES LACTATE DARRELL NURSING POCT   NO VTE PROPHYLAXIS   NOTIFY PHYSICIAN   STRICT INTAKE AND OUTPUT   IP ASSIGN PROVIDER TEAM TO TREATMENT TEAM   VITAL SIGNS   NO INDWELLING URINARY CATHETER (CHEN) PRESENT OR NEEDED   BLADDER SCAN   BLOOD CULTURE   BLOOD CULTURE   URINE CULTURE AEROBIC BACTERIAL   SPUTUM CULTURE AEROBIC BACTERIAL   GRAM STAIN   STREP PNEUMO AGN UR LESS THAN 13YRS OR CSF ANY AGE   INFLUENZA A AND B AND RSV PCR       Imaging Studies:   Recent Results (from the past 24 hour(s))   Chest  XR, 1 view portable    Narrative    XR CHEST PORT 1 VW 5/13/2018 10:35 PM    CLINICAL HISTORY: sob;     COMPARISON: 3/3/2018    FINDINGS: Cardiomediastinal silhouette is within normal limits.  Bilateral calcified pleural plaques are present and appear unchanged.  Increased left retrocardiac and left basilar pulmonary opacity. Small  left pleural effusion. No pneumothorax.      Impression    IMPRESSION:  1. Increased left retrocardiac and left basilar pulmonary opacities  may represent atelectasis or infection.  2. Stable small left pleural effusion/blunting of the costophrenic  angle.   Chest CT, IV contrast only - PE protocol    Narrative    Examination:  TEMPORARY 5/14/2018 12:22 AM      Comparison: None.    History: Fever, dyspnea and hypoxia. Possible pneumonia versus PE.    TECHNIQUE: Volumetric helical acquisition of CT images of the chest  from the lung apices to the kidneys were acquired in arterial phase  after the administration of IV contrast. Contrast: 53 cc of  Isovue-370. Three-dimensional (3D) post-processed angiographic images  were reconstructed, archived to PACS and used in the interpretation of  this study.    FINDINGS:    Chest:    There is adequate opacification of the main and lobar pulmonary  arteries. No filling defect in the lobar and main segmental pulmonary  arteries to suggest pulmonary embolism. Right ventricle is enlarged  relative to the left ventricle. Right atrium is notably enlarged.  There is reflux of contrast into the hepatic veins.    Heart is enlarged. No pericardial effusion. Mediastinal adenopathy.  Bilateral hilar soft tissue thickening. Scattered bilateral  groundglass nodules for example in the right upper lobe series 9 image  93. Consolidative and groundglass nodularity at the lung bases  bilaterally, left greater than right. Small right pleural effusion.  Moderate left pleural effusion. Calcified pleural plaques. Simple cyst  in right lobe of liver. Upper abdomen is otherwise unremarkable.    Bones and soft tissues: Degenerative changes of the spine. No  aggressive osseous lesions.      Impression    IMPRESSION:   1. No pulmonary embolism.  2. Persistent versus increased right atrial enlargement with signs of  right heart dysfunction.  3. Scattered groundglass and consolidative opacities most predominant  at the left base with pleural effusions left greater than right.  Although could be compressive atelectasis or pneumonia would still be  in the differential.    I have personally reviewed the examination and initial interpretation  and I agree with the findings.    BEVERLY GARCIA MD       Recent vital signs:   /85  Pulse 119  Temp 100.5  F  "(38.1  C) (Oral)  Resp 27  Ht 1.651 m (5' 5\")  Wt 61.2 kg (135 lb)  SpO2 97%  BMI 22.47 kg/m2    Cardiac Rhythm: Normal Sinus  Pt needs tele? No  Skin/wound Issues: None    Code Status: DNR / DNI    Pain control: pt had none    Nausea control: pt had none    Abnormal labs/tests/findings requiring intervention: Antibiotics for lung infection. Lasix for sob.    Family present during ED course? No   Family Comments/Social Situation comments: n/a    Tasks needing completion: None    Lorri Fall RN  [facility]-- [phone] [address] ED  [phone] [address] ED      "

## 2018-05-14 NOTE — PROGRESS NOTES
SPIRITUAL HEALTH SERVICES  SPIRITUAL ASSESSMENT Progress Note  [facility]   ON-CALL VISIT    REFERRAL SOURCE: Patient requested a SH encounter.     Patient sitting up in bed with her daughter Tracey at her side.  Patient was alert and oriented and very conversant about her medical history .  She shared that she has been very healthy until about last fall when she started to need some medical attention.  Was in a TCU for 3 weeks, moved from independent living to assisted living apartment. She states that she tries to be very positive, works out 7 days a week, tries to stay active. Her carla is also very important, she has a [place] nurse who visits her regularly. Patient enjoys reading her bible.  She has two daughters, Tracey who lives here and one other who lives out of state.  She voiced that she is grateful for the care they give her also.  No distress noted today.     PLAN: I will notify unit  of care provided.    Nica Dhaliwal  Staff   Pager [phone]

## 2018-05-14 NOTE — PROGRESS NOTES
Cards Overnight Fellow Note    Reason for call: Request for bedside echo by ED provider    Brief clinical Hx: 89F with persistent AF on rivaroxaban being admitted to medicine for fever, hypoxia, respiratory infection, incidentally noted to have troponin  0.240, no chest pain, asymptomatic, HR AF 80s, /80.    Bedside echo performed (images saved on Epiq machine):  Irregular rhythm throughout exam  Severe JOSE E  LV function normal-hyperdynamic, LVEF 55-65% by visual estimation  No regional wall motion abnormalities (on parasternal short axis)  RV function normal  Trivial AI  No pericardial effusion  Unable to definitely visualize IVC    Please contact our team with further issues or questions. Will fwd this note to cards attg.    Ciro Valiente MD  Cardiology Fellow  [phone]    ATTENDING ATTESTATION:  Patient was not seen by me but evaluated by cardiology fellow on call. Agree with the above note.     Dana Cadet MD, MS  Staff Cardiologist, [facility]   Pager: [phone]

## 2018-05-14 NOTE — H&P
[facility], [place]    Internal Medicine History and Physical - [facility] Service       Date of Admission:  5/13/2018    Chief Complaint   Fever, dyspnea     History is obtained from the patient    History of Present Illness   Brie Gonzalez is a 89 year old female with, past medical history of atrial fibrillation CVA hypertension, anxiety, depression who presents to [place] ED from assisted living facility via EMS for fever and dyspnea    Per EMS, 911 called as patient was found to be febrile with tremors and hypoxic on room air to 83%.  Found to have a temperature of 100.5 at assisted living per notes.  Given albuterol and Atrovent neb by EMS.  Was satting 99% on 6 L.  In the ED patient endorsed dyspnea slowly increasing onset over 2 weeks with productive cough of yellow sputum.  And one day of dizziness.  Denies: Chest pain nausea vomiting diarrhea dysuria hematuria or change in urinary frequency.    Treatment in the ED included azithromycin and cefepime with 1 L lactated Ringer's. CXR and CT angiogram with left sided, basilar, retrocardiac consolidation    Was last seen in [place] ED February 24 for lower extremity edema thought to be volume overload.  Last admitted December 2017 for viral bronchitis and elevated troponin BNP.  RSV bronchitis    Review of Systems   The 10 point Review of Systems is negative other than noted in the HPI or here.     Past Medical History    I have reviewed this patient's medical history and updated it with pertinent information if needed.   Past Medical History:   Diagnosis Date     Anxiety 2.5 years ago     Arthritis Hands for years     Atrial fibrillation (H)      Cerebral infarction (H) 4-5 years ago     Depressive disorder 2.5 years ago intermittent     Hypertension 4-5 years ago        Past Surgical History   I have reviewed this patient's surgical history and updated it with pertinent information if needed.  Past Surgical History:   Procedure  Laterality Date     ADENOIDECTOMY  1930's     GYN SURGERY   1970's uterine fibroids     TONSILLECTOMY  1930's        Social History   Social History   Substance Use Topics     Smoking status: Never Smoker     Smokeless tobacco: Never Used     Alcohol use Yes      Comment: Rare   Lives in assisted living in [place].  .  Close contact with daughter who is her next of kin.  Denies tobacco or alcohol use.  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  No recent travel    Family History   I have reviewed this patient's family history and updated it with pertinent information if needed.   Family History   Problem Relation Age of Onset     Dementia Mother      CEREBROVASCULAR DISEASE Mother        Prior to Admission Medications   Prior to Admission Medications   Prescriptions Last Dose Informant Patient Reported? Taking?   ACETAMINOPHEN PO   Yes No   Sig: Take 325 mg by mouth every 4 hours as needed for pain   ASPIRIN PO   Yes No   Sig: Take 81 mg by mouth every other day    LORAZEPAM PO   Yes No   Sig: Take 0.5 mg by mouth nightly as needed for anxiety   METOPROLOL SUCCINATE ER PO   Yes No   Sig: Take 12.5 mg by mouth daily   PAROXETINE HCL PO   Yes No   Sig: Take 10 mg by mouth daily   Rivaroxaban (XARELTO PO)   Yes No   Sig: Take 10 mg by mouth daily   SIMVASTATIN PO   Yes No   Sig: Take 20 mg by mouth At Bedtime   VITAMIN D, CHOLECALCIFEROL, PO   Yes No   Sig: Take 1,000 Units by mouth daily   Warfarin Sodium (COUMADIN PO)   Yes No   Sig: Take 2.5 mg by mouth daily    albuterol (PROAIR HFA/PROVENTIL HFA/VENTOLIN HFA) 108 (90 BASE) MCG/ACT Inhaler   Yes No   Sig: Inhale 2 puffs into the lungs every 4 hours as needed for shortness of breath / dyspnea or wheezing   azithromycin (ZITHROMAX) 250 MG tablet   Yes No   Sig: Take 2 tablets by mouth on day 1, then 1 tablet by mouth daily for 4 days   calcium-vitamin D (CALTRATE) 600-400 MG-UNIT per tablet   Yes No   Sig: Take 1 tablet by mouth daily    furosemide (LASIX) 20 MG tablet   No No   Sig: Take 1 tablet (20 mg) by mouth daily for 7 days   metoprolol (LOPRESSOR) 25 MG tablet   No No   Sig: Take 2 tablets (50 mg) by mouth daily   oxymetazoline (AFRIN) 0.05 % spray   Yes No   Sig: Spray 2 sprays into both nostrils 2 times daily as needed for congestion   sodium chloride (OCEAN) 0.65 % nasal spray   No No   Sig: Spray 1 spray into both nostrils every 2 hours      Facility-Administered Medications: None     Allergies   Allergies   Allergen Reactions     Penicillins Swelling       Physical Exam   Vital Signs: Temp: 100.5  F (38.1  C) Temp src: Oral BP: 142/85 Pulse: 119 Heart Rate: 93 Resp: 28 SpO2: 99 % O2 Device: Oxymask Oxygen Delivery: 6 LPM  Weight: 135 lbs 0 oz    General: AAOx3, NAD, pleasant on 4 L NC,  Skin: Not jaundiced, no rash, no ecchymoses  HEENT: MMM, PERRLA, EOM intact  CV: irregular RONA, tachycardic rates 100-110, no  clicks, rubs, JVP non elevated  Resp: diffuse wheeze, limited air movement on left side, crackles in left lung  Abd: Soft, non-tender, BS+, no masses appreciated  Extremities: warm and well perfused, palpable pulses, trace edema   Neuro: No lateralizing symptoms or focal neurologic deficits        Assessment & Plan     Hospital associated pneumonia  Received 1 L of fluids and cefepime and azithromycin in the ED.  With listed penicillin allergy  -Continue cefepime, as patient has frequent hospital interactions  -Blood cultures drawn, sputum culture and Gram stain ordered, pro calcitonin pending  -Urine Legionella and strep pneumo ordered    Elevated troponin  0.24 on admission labs.  Without chest pain without ischemic findings on EKG.  Likely type II MI, demand mismatch with afib, tachycardia and stress 2/2 pneumonia   -Continue her rivaroxaban   -Trend troponin    History of CVA  No current deficits.  No longer taking aspirin and atorvastatin.  Seems reasonably given age and currently on rivaroxaban    History of atrial  fibrillation/atrial flutter  Continue rivaroxaban        # Pain Assessment:  Current Pain Score 12/31/2017   Patient currently in pain? denies   Brie tony pain level was assessed and she currently denies pain.      Diet:  regular diet  Fluids:  Encourage PO intake  DVT Prophylaxis: xeralto   Code Status: DNR / DNI    Disposition Plan   Expected discharge: 2 - 3 days; recommended to prior living arrangement once antibiotic plan established.     Entered: Butch Lou 05/14/2018, 12:36 AM   Information in the above section will display in the discharge planner report.    Will be staffed in AM by receiving team and attending     Butch Lou  [facility]   Pager: [phone]  Please see sticky note for cross cover information      Data   Data     Recent Labs  Lab 05/13/18  2222   WBC 10.3   HGB 13.4   MCV 93         POTASSIUM 3.9   CHLORIDE 101   CO2 27   BUN 18   CR 0.86   ANIONGAP 7   THIERRY 9.2   *   ALBUMIN 4.1   PROTTOTAL 7.6   BILITOTAL 1.3   ALKPHOS 95   ALT 25   AST 28   TROPI 0.240*     Recent Results (from the past 24 hour(s))   Chest  XR, 1 view portable    Narrative    XR CHEST PORT 1 VW 5/13/2018 10:35 PM    CLINICAL HISTORY: sob;     COMPARISON: 3/3/2018    FINDINGS: Cardiomediastinal silhouette is within normal limits.  Bilateral calcified pleural plaques are present and appear unchanged.  Increased left retrocardiac and left basilar pulmonary opacity. Small  left pleural effusion. No pneumothorax.      Impression    IMPRESSION:  1. Increased left retrocardiac and left basilar pulmonary opacities  may represent atelectasis or infection.  2. Stable small left pleural effusion/blunting of the costophrenic  angle.   Chest CT, IV contrast only - PE protocol    Narrative    Examination:  TEMPORARY 5/14/2018 12:22 AM     Comparison: None.    History: Fever, dyspnea and hypoxia. Possible pneumonia versus PE.    TECHNIQUE: Volumetric helical  acquisition of CT images of the chest  from the lung apices to the kidneys were acquired in arterial phase  after the administration of IV contrast. Contrast dose:    FINDINGS:    Chest:    There is adequate opacification of the main and lobar pulmonary  arteries. No filling defect in the lobar and main segmental pulmonary  arteries to suggest pulmonary embolism.] Right ventricle is enlarged  relative left ventricle. Right atrium is notably enlarged. There is  reflux of contrast into the hepatic veins.    Heart is enlarged. No pericardial effusion. Mediastinal adenopathy.  Bilateral hilar soft tissue thickening. Scattered bilateral  groundglass nodules for example in the right upper lobe series 9 image  93. Consolidative and groundglass nodularity at the lung bases  bilaterally, left greater than right. Small right pleural effusion.  Moderate left pleural effusion. Calcified pleural plaques. Simple cyst  in right lobe of liver. Upper abdomen is otherwise unremarkable.      Bones and soft tissues: Degenerative changes of the spine. No  aggressive osseous lesions.        Impression    IMPRESSION:   1. No pulmonary embolism.  2. Persistent versus increased right atrial enlargement with signs of  right heart dysfunction.  3. Scattered groundglass and consolidative opacities most predominant  at the left base with pleural effusions left greater than right.  Findings compatible with pneumonia.

## 2018-05-14 NOTE — ED NOTES
Unit 5A charge nurse called and stated need for patient to return to the ED because no private room available at this time.

## 2018-05-15 ENCOUNTER — APPOINTMENT (OUTPATIENT)
Dept: PHYSICAL THERAPY | Facility: CLINIC | Age: 83
DRG: 291 | End: 2018-05-15
Attending: INTERNAL MEDICINE
Payer: COMMERCIAL

## 2018-05-15 LAB
ANION GAP SERPL CALCULATED.3IONS-SCNC: 7 MMOL/L (ref 3–14)
BACTERIA SPEC CULT: ABNORMAL
BACTERIA SPEC CULT: ABNORMAL
BACTERIA SPEC CULT: NORMAL
BUN SERPL-MCNC: 14 MG/DL (ref 7–30)
CALCIUM SERPL-MCNC: 8.5 MG/DL (ref 8.5–10.1)
CHLORIDE SERPL-SCNC: 103 MMOL/L (ref 94–109)
CO2 SERPL-SCNC: 30 MMOL/L (ref 20–32)
CREAT SERPL-MCNC: 0.81 MG/DL (ref 0.52–1.04)
ERYTHROCYTE [DISTWIDTH] IN BLOOD BY AUTOMATED COUNT: 15.7 % (ref 10–15)
FLUAV H1 2009 PAND RNA SPEC QL NAA+PROBE: NEGATIVE
FLUAV H1 RNA SPEC QL NAA+PROBE: NEGATIVE
FLUAV H3 RNA SPEC QL NAA+PROBE: NEGATIVE
FLUAV RNA SPEC QL NAA+PROBE: NEGATIVE
FLUBV RNA SPEC QL NAA+PROBE: NEGATIVE
GFR SERPL CREATININE-BSD FRML MDRD: 66 ML/MIN/1.7M2
GLUCOSE SERPL-MCNC: 102 MG/DL (ref 70–99)
GRAM STN SPEC: ABNORMAL
HADV DNA SPEC QL NAA+PROBE: NEGATIVE
HADV DNA SPEC QL NAA+PROBE: NEGATIVE
HCT VFR BLD AUTO: 34.6 % (ref 35–47)
HGB BLD-MCNC: 11.5 G/DL (ref 11.7–15.7)
HMPV RNA SPEC QL NAA+PROBE: NEGATIVE
HPIV1 RNA SPEC QL NAA+PROBE: NEGATIVE
HPIV2 RNA SPEC QL NAA+PROBE: NEGATIVE
HPIV3 RNA SPEC QL NAA+PROBE: POSITIVE
Lab: ABNORMAL
Lab: ABNORMAL
Lab: NORMAL
MCH RBC QN AUTO: 31 PG (ref 26.5–33)
MCHC RBC AUTO-ENTMCNC: 33.2 G/DL (ref 31.5–36.5)
MCV RBC AUTO: 93 FL (ref 78–100)
MICROBIOLOGIST REVIEW: ABNORMAL
PLATELET # BLD AUTO: 132 10E9/L (ref 150–450)
POTASSIUM SERPL-SCNC: 3.6 MMOL/L (ref 3.4–5.3)
PROCALCITONIN SERPL-MCNC: 0.08 NG/ML
RBC # BLD AUTO: 3.71 10E12/L (ref 3.8–5.2)
RHINOVIRUS RNA SPEC QL NAA+PROBE: NEGATIVE
RSV RNA SPEC QL NAA+PROBE: NEGATIVE
RSV RNA SPEC QL NAA+PROBE: NEGATIVE
SODIUM SERPL-SCNC: 140 MMOL/L (ref 133–144)
SPECIMEN SOURCE: ABNORMAL
SPECIMEN SOURCE: NORMAL
WBC # BLD AUTO: 6.9 10E9/L (ref 4–11)

## 2018-05-15 PROCEDURE — 94640 AIRWAY INHALATION TREATMENT: CPT

## 2018-05-15 PROCEDURE — 87070 CULTURE OTHR SPECIMN AEROBIC: CPT | Performed by: STUDENT IN AN ORGANIZED HEALTH CARE EDUCATION/TRAINING PROGRAM

## 2018-05-15 PROCEDURE — 97530 THERAPEUTIC ACTIVITIES: CPT | Mod: GP

## 2018-05-15 PROCEDURE — 87205 SMEAR GRAM STAIN: CPT | Performed by: STUDENT IN AN ORGANIZED HEALTH CARE EDUCATION/TRAINING PROGRAM

## 2018-05-15 PROCEDURE — 36415 COLL VENOUS BLD VENIPUNCTURE: CPT | Performed by: INTERNAL MEDICINE

## 2018-05-15 PROCEDURE — 94640 AIRWAY INHALATION TREATMENT: CPT | Mod: 76

## 2018-05-15 PROCEDURE — 80048 BASIC METABOLIC PNL TOTAL CA: CPT | Performed by: INTERNAL MEDICINE

## 2018-05-15 PROCEDURE — 99233 SBSQ HOSP IP/OBS HIGH 50: CPT | Mod: GC | Performed by: HOSPITALIST

## 2018-05-15 PROCEDURE — 25000128 H RX IP 250 OP 636: Performed by: HOSPITALIST

## 2018-05-15 PROCEDURE — 87106 FUNGI IDENTIFICATION YEAST: CPT | Performed by: STUDENT IN AN ORGANIZED HEALTH CARE EDUCATION/TRAINING PROGRAM

## 2018-05-15 PROCEDURE — 40000275 ZZH STATISTIC RCP TIME EA 10 MIN

## 2018-05-15 PROCEDURE — 87205 SMEAR GRAM STAIN: CPT | Performed by: HOSPITALIST

## 2018-05-15 PROCEDURE — 25000125 ZZHC RX 250: Performed by: STUDENT IN AN ORGANIZED HEALTH CARE EDUCATION/TRAINING PROGRAM

## 2018-05-15 PROCEDURE — 25000132 ZZH RX MED GY IP 250 OP 250 PS 637: Performed by: INTERNAL MEDICINE

## 2018-05-15 PROCEDURE — 25000132 ZZH RX MED GY IP 250 OP 250 PS 637: Performed by: HOSPITALIST

## 2018-05-15 PROCEDURE — 85027 COMPLETE CBC AUTOMATED: CPT | Performed by: INTERNAL MEDICINE

## 2018-05-15 PROCEDURE — 97161 PT EVAL LOW COMPLEX 20 MIN: CPT | Mod: GP

## 2018-05-15 PROCEDURE — 25000132 ZZH RX MED GY IP 250 OP 250 PS 637: Performed by: STUDENT IN AN ORGANIZED HEALTH CARE EDUCATION/TRAINING PROGRAM

## 2018-05-15 PROCEDURE — 84145 PROCALCITONIN (PCT): CPT | Performed by: INTERNAL MEDICINE

## 2018-05-15 PROCEDURE — 40000141 ZZH STATISTIC PERIPHERAL IV START W/O US GUIDANCE

## 2018-05-15 PROCEDURE — 40000193 ZZH STATISTIC PT WARD VISIT

## 2018-05-15 PROCEDURE — 12000001 ZZH R&B MED SURG/OB UMMC

## 2018-05-15 PROCEDURE — 97116 GAIT TRAINING THERAPY: CPT | Mod: GP

## 2018-05-15 RX ORDER — LEVOFLOXACIN 750 MG/1
750 TABLET, FILM COATED ORAL
Status: DISCONTINUED | OUTPATIENT
Start: 2018-05-15 | End: 2018-05-16

## 2018-05-15 RX ORDER — POTASSIUM CHLORIDE 750 MG/1
40 TABLET, EXTENDED RELEASE ORAL ONCE
Status: COMPLETED | OUTPATIENT
Start: 2018-05-15 | End: 2018-05-15

## 2018-05-15 RX ADMIN — ASPIRIN 81 MG: 81 TABLET, COATED ORAL at 08:45

## 2018-05-15 RX ADMIN — SALINE NASAL SPRAY 2 SPRAY: 1.5 SOLUTION NASAL at 14:50

## 2018-05-15 RX ADMIN — CEFEPIME HYDROCHLORIDE 2 G: 2 INJECTION, POWDER, FOR SOLUTION INTRAVENOUS at 08:45

## 2018-05-15 RX ADMIN — IPRATROPIUM BROMIDE AND ALBUTEROL SULFATE 3 ML: .5; 3 SOLUTION RESPIRATORY (INHALATION) at 07:43

## 2018-05-15 RX ADMIN — RIVAROXABAN 10 MG: 10 TABLET, FILM COATED ORAL at 08:45

## 2018-05-15 RX ADMIN — LEVOFLOXACIN 750 MG: 750 TABLET, FILM COATED ORAL at 12:12

## 2018-05-15 RX ADMIN — ACETAMINOPHEN 650 MG: 325 TABLET, FILM COATED ORAL at 08:45

## 2018-05-15 RX ADMIN — IPRATROPIUM BROMIDE AND ALBUTEROL SULFATE 3 ML: .5; 3 SOLUTION RESPIRATORY (INHALATION) at 20:02

## 2018-05-15 RX ADMIN — METOPROLOL TARTRATE 50 MG: 50 TABLET, FILM COATED ORAL at 08:45

## 2018-05-15 RX ADMIN — FUROSEMIDE 40 MG: 40 TABLET ORAL at 08:45

## 2018-05-15 RX ADMIN — PAROXETINE 10 MG: 10 TABLET, FILM COATED ORAL at 08:45

## 2018-05-15 RX ADMIN — Medication 12.5 MG: at 22:12

## 2018-05-15 RX ADMIN — POTASSIUM CHLORIDE 40 MEQ: 750 TABLET, EXTENDED RELEASE ORAL at 08:45

## 2018-05-15 RX ADMIN — IPRATROPIUM BROMIDE AND ALBUTEROL SULFATE 3 ML: .5; 3 SOLUTION RESPIRATORY (INHALATION) at 12:03

## 2018-05-15 RX ADMIN — VANCOMYCIN HYDROCHLORIDE 1250 MG: 10 INJECTION, POWDER, LYOPHILIZED, FOR SOLUTION INTRAVENOUS at 06:09

## 2018-05-15 NOTE — PLAN OF CARE
Problem: Cardiac Disease Comorbidity  Goal: Cardiac Disease  Patient comorbidity will be monitored for signs and symptoms of Cardiac Disease.  Problems will be absent, minimized or managed by discharge/transition of care.   Outcome: No Change  No change in cardiac status. Received oral lasix and has been voiding frequently. See intake and output. Not drinking or eating much although has been encouraged to do so. Likes the orange popsicle given to her at bedtime.

## 2018-05-15 NOTE — PLAN OF CARE
Problem: Pneumonia (Adult)  Goal: Signs and Symptoms of Listed Potential Problems Will be Absent, Minimized or Managed (Pneumonia)  Signs and symptoms of listed potential problems will be absent, minimized or managed by discharge/transition of care (reference Pneumonia (Adult) CPG).   Outcome: No Change  Patient is constantly getting out of bed as she had lasix at 1800. Bedside commode was provided. Bed alarm on for safety as patient doesn't call for help to get out of bed. When asked she states that she knows the date and where she is. Keeps getting tangled up in oxygen tubing and Iv line. PIV was saline locked after antibiotic infused so there is one less line to get tangled up in. Requiring 3 liters of oxygen to keep sats in upper 90's. LS with wheezes throughout. On scheduled nebulizers. Has low grade temp and is on Iv antibiotics. Monitor for safety and respiratory status closely. Moved closer to the nurse's desk for closer monitoring.

## 2018-05-15 NOTE — PROGRESS NOTES
[facility]  Daily Progress Note    Main Plans for Today   - De-escalate abx - dc/ cefepime, vanc > start Levaquin.    - Respiratory viral panel pending.  - Continue Lasix 40 mg daily, decrease to 20 PTA.     - Duonebs QID while awake.    - PT/OT    Assessment & Plan   Brie Gonzalez is a 89 year old female admitted on 5/13/2018. She has a history of atrial fibrillation on Rivaroxaban, CVA and is admitted for cough, shortness of breath concerning for pneumonia    Acute hypoxic respiratory failure:  Pneumonia  Concern with Health-care associated pneumonia - lives in an assisted living facility. Possible superimposed bacterial infection with initial viral syndrome. CT PE negative on admission. Procal negative.    - Respiratory panel, Influenza, RSV pending.    - Started on Cefepime, Azithromycin, and Vancomycin on admission. Now d/c'd 5/15.   - Start Levaquin for a 7 day course of abx - end date  05/20.    - CXR with retrocardiac opacity.     Elevated troponin  NSTEMI - type II Demand in the setting of hypoxia.    Atrial fibrillation/flutter:   HFpEF (acute on chronic)  -  Echocardiogram, normal LVEF, no WMA, dilated IVC.   - Troponin peaked at 0.3. Likely demand in the setting of respiratory illness. No recent ischemic evaluation, likely warrants as an outpatient when improved from current respiratory process. Was seen by Cardiology on 03/2018, will arrange for outpatient evaluation.    - Continue Rivaroxaban and Metoprolol  - Minimally hypervolemic > responding well to PO Lasix 40 mg. PTA 20 mg daily.    - Strict I/Os, and daily weights    Chronic Medical conditions:   CVA- ASA 81mg.    Anxiety: Lorazepam PRN for sleep, Continue Paroxetine  HLD: continue statin.     # Pain Assessment:  Current Pain Score 5/15/2018   Patient currently in pain? denies   Brie s pain level was assessed and she currently denies pain.      Diet: Combination Diet Regular Diet Adult  Room Service  Fluids: PRN  DVT  Prophylaxis: Anticoagulated   Code Status: DNR / DNI    Disposition Plan   Expected discharge: Tomorrow, recommended to prior living arrangement once antibiotic plan established and O2 use less than 1 liters/minute.     Entered: Diego Lozanody 05/15/2018, 6:45 AM   Information in the above section will display in the discharge planner report.      The patient's care was discussed with the Attending Physician, Dr. Cortez.    Diegocici Bush  [facility]  [place]: 5  Pager: [phone]  Please see sticky note for cross cover information    Interval History   No acute events overnight. Remained on oxygen, but needs improving to 2 liters on my evaluation this AM. Indicates her cough is improving as well. Has not attempted ambulation. She otherwise feels well. Tolerating her diet. Voiding without difficulty.      Physical Exam   Vital Signs: Temp: 97.1  F (36.2  C) Temp src: Oral BP: 147/54 Pulse: 84 Heart Rate: 70 Resp: 20 SpO2: 96 % O2 Device: Nasal cannula Oxygen Delivery: 4 LPM  Weight: 136 lbs 7.44 oz  General Appearance: Elderly female, resting in bed, no acute distress.    Respiratory: Bibasilar crackles L>R, scattered wheezes.     Cardiovascular: RRR  GI: Soft, non-tender, NA bowel sounds.    Skin: Scattered ecchymosis, no rashes.    Other: Grossly non focal neurologic exam. Moving all extremities. Strength is grossly symmetric and normal Sensation is intact. CN II-XII intact. Oriented x 4.           Data   Medications     - MEDICATION INSTRUCTIONS -         aspirin  81 mg Oral Daily     furosemide  40 mg Oral Daily     ipratropium - albuterol 0.5 mg/2.5 mg/3 mL  3 mL Nebulization 4x Daily     levofloxacin  750 mg Oral Q48H     metoprolol tartrate  50 mg Oral Daily     PARoxetine  10 mg Oral Daily     rivaroxaban ANTICOAGULANT  10 mg Oral Daily     Data     Recent Labs  Lab 05/15/18  0544 05/14/18  1028 05/14/18  0534 05/13/18  2222   WBC 6.9  --  7.9 10.3   HGB 11.5*  --  11.2* 13.4   MCV 93  --   93 93   *  --  131* 161     --  137 135   POTASSIUM 3.6  --  3.8 3.9   CHLORIDE 103  --  106 101   CO2 30  --  22 27   BUN 14  --  16 18   CR 0.81  --  0.78 0.86   ANIONGAP 7  --  10 7   THIERRY 8.5  --  8.0* 9.2   *  --  162* 136*   ALBUMIN  --   --   --  4.1   PROTTOTAL  --   --   --  7.6   BILITOTAL  --   --   --  1.3   ALKPHOS  --   --   --  95   ALT  --   --   --  25   AST  --   --   --  28   TROPI  --  0.299* 0.355* 0.240*     No results found for this or any previous visit (from the past 24 hour(s)).

## 2018-05-15 NOTE — PHARMACY-ADMISSION MEDICATION HISTORY
Pharmacy Admission Medication History    Admission medication history interview status for the 5/13/2018 admission is complete.   See EPIC admission navigator for allergy information, prior to admission medications and immunization status.   Medication history interview source(s): [facility]    Medication history resources (including written lists, pill bottles, clinic record): Medication list from [facility] ([phone])    Medication history source reliability: Good    Pneumococcal and influenza vaccine history documented: yes    Actions taken by pharmacist (provider contacted, medication changes, etc):None     Changes made to medication history:    Added to medication list: Scheduled tylenol, scheduled nasal spray    Removed from medication list: Lorazepam 0.5 mg QHS prn, ASA 81 mg every other day, Simvastatin 20 mg daily    Modified medication on list: Metoprolol tartrate 50 mg daily>> Metoprolol Succinate 12.5 mg daily    Additional medication history information: Azithromycin was prescribed but never started. Was supposed to start the evening of hospital admission    Medication reconciliation/reorder completed by provider prior to medication history? Yes    Time spent in this activity: 35 minutes    Prior to Admission medications    Medication Sig Last Dose Taking? Auth Provider   albuterol (PROAIR HFA/PROVENTIL HFA/VENTOLIN HFA) 108 (90 BASE) MCG/ACT Inhaler Inhale 2 puffs into the lungs every 4 hours as needed for shortness of breath / dyspnea or wheezing Past Week at Unknown time Yes Unknown, Entered By History   Furosemide (LASIX PO) Take 20 mg by mouth daily 5/13/2018 Yes Unknown, Entered By History   sodium chloride (OCEAN) 0.65 % nasal spray Spray 2 sprays into both nostrils 2 times daily as needed for other (nasal dryness) Past Week at Unknown time Yes Unknown, Entered By History   ACETAMINOPHEN PO Take 650 mg by mouth daily  5/10/2018  Unknown, Entered By History   ACETAMINOPHEN PO Take 325 mg by  mouth every 4 hours as needed for pain Unknown at Unknown time  Unknown, Entered By History   azithromycin (ZITHROMAX) 250 MG tablet Take 2 tablets by mouth on day 1, then 1 tablet by mouth daily for 4 days Unknown at Unknown time  Unknown, Entered By History   METOPROLOL SUCCINATE ER PO Take 12.5 mg by mouth daily 5/13/2018  Reported, Patient   PAROXETINE HCL PO Take 10 mg by mouth daily 5/13/2018  Unknown, Entered By History   Rivaroxaban (XARELTO PO) Take 10 mg by mouth daily 5/13/2018  Reported, Patient   sodium chloride (OCEAN) 0.65 % nasal spray Spray 1 spray into both nostrils every 2 hours  Patient taking differently: Spray 1 spray into both nostrils 3 times daily  5/13/2018 at Unknown time  Merry Akins PA-C   VITAMIN D, CHOLECALCIFEROL, PO Take 1,000 Units by mouth daily 5/13/2018 at Unknown time  Unknown, Entered By History

## 2018-05-15 NOTE — PLAN OF CARE
Problem: Pneumonia (Adult)  Goal: Signs and Symptoms of Listed Potential Problems Will be Absent, Minimized or Managed (Pneumonia)  Signs and symptoms of listed potential problems will be absent, minimized or managed by discharge/transition of care (reference Pneumonia (Adult) CPG).    05/15/18 1431   Problems Assessed (Pneumonia) All    Problems Present (Pneumonia) Improving       Problem: Patient Care Overview  Goal: Plan of Care/Patient Progress Review   05/15/18 1431   Plan Of Care Reviewed With Patient   Progress improving       AVSS. A & O x3, Gave tylenol x2 for generalized pain. Sputum culture sent. Sat up in the chair most of the day. Calls appropriately when she needing to use the commode for voiding. Reports a little nose bleed from nasal dryness. Placed O2 humidification for oxygen. Prn ocean nasal spray given. Walked in halls with therapy.

## 2018-05-15 NOTE — PLAN OF CARE
Problem: Patient Care Overview  Goal: Plan of Care/Patient Progress Review  OT5B: OT orders received and acknowledged. Per discussion with PT and chart review pt with no acute OT needs at this time, lives in an assisted living facility and is currently [place] for I/ADLS; however, if needed pt endorsing she has assistance available to her. OT to defer and complete orders at this time. Please re-consult should pt status change.

## 2018-05-15 NOTE — PROGRESS NOTES
05/15/18 0904   Quick Adds   Type of Visit Initial PT Evaluation   Living Environment   Lives With facility resident   Living Arrangements assisted living   Home Accessibility tub/shower is not walk in;grab bars present (bathtub);grab bars present (toilet)   Number of Stairs to Enter Home 0  (~20 within the building, does not have to perform)   Number of Stairs Within Home 0   Stair Railings at Home inside, present at both sides   Living Environment Comment pt lives in an assisted living facility, no concerns for mobility. Reports there is elevator access however does typically complete the stairs without difficulty.    Self-Care   Dominant Hand right   Usual Activity Tolerance good   Current Activity Tolerance moderate   Regular Exercise yes   Activity/Exercise Type strength training;walking   Exercise Amount/Frequency daily   Equipment Currently Used at Home none   Activity/Exercise/Self-Care Comment pt ows a 4WW, straight cane, and has multiple grab bars in the bathroom and a shower chair   Functional Level Prior   Ambulation 0-->independent   Transferring 0-->independent   Toileting 1-->assistive equipment   Bathing 1-->assistive equipment   Dressing 0-->independent   Eating 0-->independent   Communication 0-->understands/communicates without difficulty   Swallowing 0-->swallows foods/liquids without difficulty   Cognition 0 - no cognition issues reported   Fall history within last six months no   Which of the above functional risks had a recent onset or change? ambulation   Prior Functional Level Comment Pt maintains independence with ADLs and ambulation; reports using grab bars in the bathroom for safety and will occasionally use her shower chair.    General Information   Onset of Illness/Injury or Date of Surgery - Date 05/13/18   Referring Physician Diego Bush MD   Patient/Family Goals Statement pt states 'I think I need more strength since I haven't done much during the winter'   Pertinent History of  Current Problem (include personal factors and/or comorbidities that impact the POC) Brie Gonzalez is a 89 year old female admitted on 5/13/2018. She has a history of atrial fibrillation on Rivaroxaban, CVA and is admitted for cough, shortness of breath concerning for pneumonia   Precautions/Limitations fall precautions;oxygen therapy device and L/min   Weight-Bearing Status - LUE full weight-bearing   Weight-Bearing Status - RUE full weight-bearing   Weight-Bearing Status - LLE full weight-bearing   Weight-Bearing Status - RLE full weight-bearing   General Observations 2 LPM O2   General Info Comments activity: up with assist   Cognitive Status Examination   Orientation orientation to person, place and time   Level of Consciousness alert   Follows Commands and Answers Questions 100% of the time   Personal Safety and Judgment intact   Memory intact   Cognitive Comment pt is alert and oriented   Pain Assessment   Patient Currently in Pain No   Integumentary/Edema   Integumentary/Edema no deficits were identifed   Posture    Posture Forward head position;Protracted shoulders   Range of Motion (ROM)   ROM Comment BUE/BLE wfl   Strength   Strength Comments BUE/BLE >3+/5 per observation   Bed Mobility   Bed Mobility Comments Mod I supine <> sit; HOB elevated   Transfer Skills   Transfer Comments SBA to Mod I sit <> Stand and bed <> chair transfers; no AE utilized   Gait   Gait Comments Amb x 30' with unilateral IV pole support for light balance assist, steady gait, no LOB. 2 LPM O2.    Sensory Examination   Sensory Perception no deficits were identified   General Therapy Interventions   Planned Therapy Interventions balance training;bed mobility training;gait training;neuromuscular re-education;transfer training;strengthening;home program guidelines   Clinical Impression   Criteria for Skilled Therapeutic Intervention yes, treatment indicated   PT Diagnosis impaired activity tolerance   Influenced by the following  "impairments O2 demands, weakness, fatigue   Functional limitations due to impairments decreased tolerance of functional activity; increased O2 demands   Clinical Presentation Stable/Uncomplicated   Clinical Presentation Rationale pt presentation, clinical reasoning   Clinical Decision Making (Complexity) Low complexity   Therapy Frequency` (4x/week)   Predicted Duration of Therapy Intervention (days/wks) 1 week   Anticipated Discharge Disposition (return to [facility])   Risk & Benefits of therapy have been explained Yes   Patient, Family & other staff in agreement with plan of care Yes   Clinical Impression Comments evaluation completed, treatment initiated   [facility] TM \"6 Clicks\"   2016, Trustees of [facility], under license to [employer].  All rights reserved.   6 Clicks Short Forms Basic Mobility Inpatient Short Form   [facility]  \"6 Clicks\" V.2 Basic Mobility Inpatient Short Form   1. Turning from your back to your side while in a flat bed without using bedrails? 4 - None   2. Moving from lying on your back to sitting on the side of a flat bed without using bedrails? 4 - None   3. Moving to and from a bed to a chair (including a wheelchair)? 4 - None   4. Standing up from a chair using your arms (e.g., wheelchair, or bedside chair)? 4 - None   5. To walk in hospital room? 4 - None   6. Climbing 3-5 steps with a railing? 3 - A Little   Basic Mobility Raw Score (Score out of 24.Lower scores equate to lower levels of function) 23   Total Evaluation Time   Total Evaluation Time (Minutes) 8     "

## 2018-05-15 NOTE — PLAN OF CARE
Problem: Patient Care Overview  Goal: Plan of Care/Patient Progress Review  Discharge Planner PT 5B  Patient plan for discharge: return to [facility]  Current status: pt completes bed mobility with mod I; performs sit <> stand and bed <> chair transfers with SBA to Mod I. Ambulates 2 x 100' with unilateral IV pole and SBA; no LOB noted with ambulation. Pt utilizes 2 LPM O2 throughout, desaturates to 92% with activity, 96% at rest.   Barriers to return to prior living situation: none per mobility  Recommendations for discharge: return to halfway  Rationale for recommendations: pt completing mobility adequate for safe return to [facility], with assist as needed.        Entered by: Andrés Rawls 05/15/2018 9:54 AM       Pt will benefit from 3-4 walks/day with RN staff providing SBA, utilizes FWW and gait belt.

## 2018-05-15 NOTE — PLAN OF CARE
Problem: Pneumonia (Adult)  Goal: Signs and Symptoms of Listed Potential Problems Will be Absent, Minimized or Managed (Pneumonia)  Signs and symptoms of listed potential problems will be absent, minimized or managed by discharge/transition of care (reference Pneumonia (Adult) CPG).   Outcome: No Change  Pt alert and oriented X 4. Denies pain or shortness of breath. O2 sats 97-98 % on 4L NC. Lung sounds with expiratory wheezes, on scheduled nebs. Pt slightly unsteady on feet, put on bed alarm but pt too fast and upto the commode already. Voided X 2. Pt with cough, productive , sent sputum  down for culture and gram stain but lab said its contaminated with oral isabella. Need another sample but pt sleeping. Need to collect again when pt awake. Strict I&O's. PIV bleeding from right arm, removed. Dressing and coban applied. Vancomycin infusing through piv on left arm.  Continue to monitor respiratory status closely. Fall precaution. Continue with plan of care.

## 2018-05-16 ENCOUNTER — APPOINTMENT (OUTPATIENT)
Dept: PHYSICAL THERAPY | Facility: CLINIC | Age: 83
DRG: 291 | End: 2018-05-16
Payer: COMMERCIAL

## 2018-05-16 LAB
ANION GAP SERPL CALCULATED.3IONS-SCNC: 6 MMOL/L (ref 3–14)
BUN SERPL-MCNC: 18 MG/DL (ref 7–30)
CALCIUM SERPL-MCNC: 8.9 MG/DL (ref 8.5–10.1)
CHLORIDE SERPL-SCNC: 102 MMOL/L (ref 94–109)
CO2 SERPL-SCNC: 31 MMOL/L (ref 20–32)
CREAT SERPL-MCNC: 0.82 MG/DL (ref 0.52–1.04)
ERYTHROCYTE [DISTWIDTH] IN BLOOD BY AUTOMATED COUNT: 15.6 % (ref 10–15)
GFR SERPL CREATININE-BSD FRML MDRD: 65 ML/MIN/1.7M2
GLUCOSE SERPL-MCNC: 112 MG/DL (ref 70–99)
HCT VFR BLD AUTO: 36.4 % (ref 35–47)
HGB BLD-MCNC: 11.9 G/DL (ref 11.7–15.7)
MCH RBC QN AUTO: 30.9 PG (ref 26.5–33)
MCHC RBC AUTO-ENTMCNC: 32.7 G/DL (ref 31.5–36.5)
MCV RBC AUTO: 95 FL (ref 78–100)
PLATELET # BLD AUTO: 156 10E9/L (ref 150–450)
POTASSIUM SERPL-SCNC: 4.5 MMOL/L (ref 3.4–5.3)
RBC # BLD AUTO: 3.85 10E12/L (ref 3.8–5.2)
SODIUM SERPL-SCNC: 139 MMOL/L (ref 133–144)
WBC # BLD AUTO: 6.7 10E9/L (ref 4–11)

## 2018-05-16 PROCEDURE — 25000125 ZZHC RX 250: Performed by: STUDENT IN AN ORGANIZED HEALTH CARE EDUCATION/TRAINING PROGRAM

## 2018-05-16 PROCEDURE — 12000001 ZZH R&B MED SURG/OB UMMC

## 2018-05-16 PROCEDURE — 36415 COLL VENOUS BLD VENIPUNCTURE: CPT | Performed by: INTERNAL MEDICINE

## 2018-05-16 PROCEDURE — 40000274 ZZH STATISTIC RCP CONSULT EA 30 MIN

## 2018-05-16 PROCEDURE — 94640 AIRWAY INHALATION TREATMENT: CPT

## 2018-05-16 PROCEDURE — 25000132 ZZH RX MED GY IP 250 OP 250 PS 637: Performed by: STUDENT IN AN ORGANIZED HEALTH CARE EDUCATION/TRAINING PROGRAM

## 2018-05-16 PROCEDURE — 25000132 ZZH RX MED GY IP 250 OP 250 PS 637: Performed by: INTERNAL MEDICINE

## 2018-05-16 PROCEDURE — 80048 BASIC METABOLIC PNL TOTAL CA: CPT | Performed by: INTERNAL MEDICINE

## 2018-05-16 PROCEDURE — 99233 SBSQ HOSP IP/OBS HIGH 50: CPT | Mod: GC | Performed by: HOSPITALIST

## 2018-05-16 PROCEDURE — 25000132 ZZH RX MED GY IP 250 OP 250 PS 637: Performed by: HOSPITALIST

## 2018-05-16 PROCEDURE — 85027 COMPLETE CBC AUTOMATED: CPT | Performed by: INTERNAL MEDICINE

## 2018-05-16 PROCEDURE — 40000193 ZZH STATISTIC PT WARD VISIT

## 2018-05-16 PROCEDURE — 82565 ASSAY OF CREATININE: CPT | Performed by: INTERNAL MEDICINE

## 2018-05-16 PROCEDURE — 97530 THERAPEUTIC ACTIVITIES: CPT | Mod: GP

## 2018-05-16 RX ORDER — IPRATROPIUM BROMIDE AND ALBUTEROL SULFATE 2.5; .5 MG/3ML; MG/3ML
3 SOLUTION RESPIRATORY (INHALATION) EVERY 4 HOURS PRN
Status: DISCONTINUED | OUTPATIENT
Start: 2018-05-16 | End: 2018-05-17 | Stop reason: HOSPADM

## 2018-05-16 RX ORDER — FLUTICASONE PROPIONATE 50 MCG
1 SPRAY, SUSPENSION (ML) NASAL DAILY
Status: DISCONTINUED | OUTPATIENT
Start: 2018-05-16 | End: 2018-05-17 | Stop reason: HOSPADM

## 2018-05-16 RX ORDER — FUROSEMIDE 20 MG
20 TABLET ORAL DAILY
Status: DISCONTINUED | OUTPATIENT
Start: 2018-05-17 | End: 2018-05-17

## 2018-05-16 RX ADMIN — SALINE NASAL SPRAY 2 SPRAY: 1.5 SOLUTION NASAL at 10:33

## 2018-05-16 RX ADMIN — RIVAROXABAN 10 MG: 10 TABLET, FILM COATED ORAL at 08:39

## 2018-05-16 RX ADMIN — FLUTICASONE PROPIONATE 1 SPRAY: 50 SPRAY, METERED NASAL at 11:20

## 2018-05-16 RX ADMIN — IPRATROPIUM BROMIDE AND ALBUTEROL SULFATE 3 ML: .5; 3 SOLUTION RESPIRATORY (INHALATION) at 08:35

## 2018-05-16 RX ADMIN — PAROXETINE 10 MG: 10 TABLET, FILM COATED ORAL at 08:40

## 2018-05-16 RX ADMIN — FUROSEMIDE 40 MG: 40 TABLET ORAL at 08:40

## 2018-05-16 NOTE — PLAN OF CARE
Problem: Patient Care Overview  Goal: Plan of Care/Patient Progress Review  Discharge Planner PT   Patient plan for discharge: [facility]  Current status: Patient demos indep with bed mobility, transfers, and ambulation without using assistive device. Has met all PT goals. Discontinuing HEP goal as patient participates with daily exercise program at [facility]. PT will complete orders.   Barriers to return to prior living situation: none from mobility standpoint   Recommendations for discharge: return to [facility]         Entered by: Jacquelin Hamlin 05/16/2018 4:31 PM        Physical Therapy Discharge Summary    Reason for therapy discharge:    All goals and outcomes met, no further needs identified.    Progress towards therapy goal(s). See goals on Care Plan in [facility] electronic health record for goal details.  Goals met    Therapy recommendation(s):    Continue home exercise program. - exercises at [facility]

## 2018-05-16 NOTE — PLAN OF CARE
VSS, weaned off of oxygen, denies pain. A&Ox4, forgetful at times. Pt up SBA to bedside commode. L PIV saline locked. IS use encouraged, neb treatments given as ordered. To return to assisted living facility upon discharge.

## 2018-05-16 NOTE — PROVIDER NOTIFICATION
MD paged due to new brown/black/bruised spot on R hand. Pt called nurse into room to assess, pt reports no pain, doesn't remember hitting it on anything. No IV or blood draws recently, area marked. Awaiting response.

## 2018-05-16 NOTE — PROGRESS NOTES
[facility] 5  Daily Progress Note    Main Plans for Today       - DC antibiotics.    - Decrease Lasix 40 mg to 20 mg PTA dose.     - Duonebs QID while awake.    - Wean O2  - Ambulate every shift.      Assessment & Plan   Brie Gonzalez is a 89 year old female admitted on 5/13/2018. She has a history of atrial fibrillation on Rivaroxaban, CVA and is admitted for cough, shortness of breath concerning for pneumonia, now noted to have parainfluenzae.      Acute hypoxic respiratory failure (improving):  Viral Bronchitis   Parainfluenzae   Initial Concern with Health-care associated pneumonia - lives in an assisted living facility. Possible superimposed bacterial infection with initial viral syndrome. CT PE negative on admission. Procal negative.  Now with viral panel results, d/c antibiotics   - Respiratory panel with parainfluenzae positive.    - Started on Cefepime, Azithromycin, and Vancomycin on admission. Now d/c'd 5/15. Levaquin 5/15-5/16  - DC Levaquin with parainfluenzae positive    - CXR with retrocardiac opacity.   - Fluticasone for nasal congestion.  - On room air 5/16 - monitor for additional day and discharge in the AM if improved.     Elevated troponin  NSTEMI - type II Demand in the setting of hypoxia.    Atrial fibrillation/flutter:   HFpEF (acute on chronic)  -  Echocardiogram, normal LVEF, no WMA, dilated IVC on admission.  - Troponin peaked at 0.3. Likely demand in the setting of respiratory illness. No recent ischemic evaluation, likely warrants as an outpatient when improved from current respiratory process. Was seen by Cardiology on 03/2018, will arrange for outpatient evaluation.    - Continue Rivaroxaban and Metoprolol 12.5 mg XL.  - Minimally hypervolemic > responding well to PO Lasix 40 mg - resume PTA 20 mg daily 5/17.    - Strict I/Os, and daily weights    Chronic Medical conditions:   CVA- ASA 81mg.    Anxiety: Lorazepam PRN for sleep, Continue Paroxetine  HLD:  continue statin.     # Pain Assessment:  Current Pain Score 5/16/2018   Patient currently in pain? denies   Pain location -   Brie tony pain level was assessed and she currently denies pain.      Diet: Combination Diet Regular Diet Adult  Room Service  Fluids: PRN  DVT Prophylaxis: Anticoagulated   Code Status: DNR / DNI    Disposition Plan   Expected discharge: Tomorrow, recommended to prior living arrangement once antibiotic plan established and O2 use less than 1 liters/minute.     Entered: Diego Bush 05/16/2018, 10:33 AM   Information in the above section will display in the discharge planner report.      The patient's care was discussed with the Attending Physician, Dr. Cortez.    Diego Bush  [facility]  Maroon: 5  Pager: [phone]  Please see sticky note for cross cover information    Interval History      No acute events overnight. Endorses some shortness of breath with nasal congestion. No worsening cough. No fever, chills, nausea, vomiting, Otherwise feels well. Tolerating diet. Normal bowel movements.        Physical Exam   Vital Signs: Temp: 97.8  F (36.6  C) Temp src: Oral BP: 131/59 Pulse: 71 Heart Rate: 72 Resp: 18 SpO2: 98 % O2 Device: Nasal cannula with humidification Oxygen Delivery: 2 LPM  Weight: 132 lbs 8 oz  General Appearance: Elderly female, resting in bed, no acute distress.    Respiratory: Bibasilar crackles.     Cardiovascular: RRR  GI: Soft, non-tender, NA bowel sounds.    Skin: Scattered ecchymosis, no rashes.    Other: Grossly non focal neurologic exam. Moving all extremities. Strength is grossly symmetric and normal Sensation is intact. CN II-XII intact. Oriented x 4.         Data   Medications     - MEDICATION INSTRUCTIONS -         fluticasone  1 spray Both Nostrils Daily     [START ON 5/17/2018] furosemide  20 mg Oral Daily     ipratropium - albuterol 0.5 mg/2.5 mg/3 mL  3 mL Nebulization 4x Daily     metoprolol succinate  12.5 mg Oral At Bedtime      PARoxetine  10 mg Oral Daily     rivaroxaban ANTICOAGULANT  10 mg Oral Daily     Data     Recent Labs  Lab 05/16/18  0842 05/15/18  0544 05/14/18  1028 05/14/18  0534 05/13/18  2222   WBC 6.7 6.9  --  7.9 10.3   HGB 11.9 11.5*  --  11.2* 13.4   MCV 95 93  --  93 93    132*  --  131* 161    140  --  137 135   POTASSIUM 4.5 3.6  --  3.8 3.9   CHLORIDE 102 103  --  106 101   CO2 31 30  --  22 27   BUN 18 14  --  16 18   CR 0.82 0.81  --  0.78 0.86   ANIONGAP 6 7  --  10 7   THIERRY 8.9 8.5  --  8.0* 9.2   * 102*  --  162* 136*   ALBUMIN  --   --   --   --  4.1   PROTTOTAL  --   --   --   --  7.6   BILITOTAL  --   --   --   --  1.3   ALKPHOS  --   --   --   --  95   ALT  --   --   --   --  25   AST  --   --   --   --  28   TROPI  --   --  0.299* 0.355* 0.240*     No results found for this or any previous visit (from the past 24 hour(s)).

## 2018-05-16 NOTE — PLAN OF CARE
Problem: Patient Care Overview  Goal: Plan of Care/Patient Progress Review  Outcome: Improving  Pt A&O,forgetful at times, VSS on 2L O2. Called once c/o shortness of breath. Pt was boosted in bed. Pt reported that this helped her breathing. On BA for safety. Has a bedside commode. SBA. Bruise has stayed within lines. LS diminished. Denies pain. Will continue to monitor and follow POC.

## 2018-05-16 NOTE — PLAN OF CARE
Problem: Patient Care Overview  Goal: Plan of Care/Patient Progress Review  Outcome: Improving  Patient is alert and oriented x4, patient is forgetful at times. Patient is vitally stable on 2L O2. Patients daughter came to visit her today and they went for a walk around the hospital. Patient had a bruise that appeared this evening on her right hand, but she said she didn't remember hitting it on anything, the area is marked and the provider was notified. Continue to monitor and notify MD of any changes.

## 2018-05-16 NOTE — PROGRESS NOTES
Care Coordinator Progress Note    Admission Date/Time:  5/13/2018  Attending MD:  Anna Jerry MD    Data  Chart reviewed, discussed with interdisciplinary team.   Patient was admitted for:    Acute respiratory failure with hypoxia (H)  Pneumonia of left lower lobe due to infectious organism (H)  NSTEMI (non-ST elevated myocardial infarction) (H)  Atrial fibrillation, unspecified type (H)  Long-term (current) use of anticoagulants.    Concerns with insurance coverage for discharge needs: None.  Current Living Situation: Patient lives in an assisted living facility.  [facility] Ph: [phone] Fax: [phone]  Support System: Supportive and Involved  Services Involved: Assisted Living; housekeeping and laundry services only  Transportation at Discharge: Family or friend will provide  Transportation to Medical Appointments: - family will provide  Barriers to Discharge: wean oxygen    Coordination of Care     Updated PCP:   Mary Ellen Arevalo NP PCP - General  05/16/2018 End  5/16/18   Phone: [phone]; Fax: [phone]          Assessment  D: Chart reviewed and plan of care discussed with MD team and nursing. Plan for patient to discharge tomorrow after oxygen has been weaned. I/A: No discharge needs identified at this time. Patients daughter will drive her home at discharge. Patient must arrive at [facility] by 2pm. Any new medications need to be filled here and set with to [facility]. P: Care Coordinator will remain available for discharge needs that may arise.     Plan  Anticipated Discharge Date:  5/17/2018  Anticipated Discharge Plan:  [facility]     Sharee JACKSON RN PHN  Patient Care Management Coordinator  [facility], [facility]  Phone: [phone] / Pager: [phone]

## 2018-05-17 VITALS
DIASTOLIC BLOOD PRESSURE: 76 MMHG | TEMPERATURE: 97.6 F | RESPIRATION RATE: 18 BRPM | WEIGHT: 127.5 LBS | BODY MASS INDEX: 21.24 KG/M2 | OXYGEN SATURATION: 96 % | HEIGHT: 65 IN | SYSTOLIC BLOOD PRESSURE: 144 MMHG | HEART RATE: 65 BPM

## 2018-05-17 LAB
ANION GAP SERPL CALCULATED.3IONS-SCNC: 9 MMOL/L (ref 3–14)
BACTERIA SPEC CULT: ABNORMAL
BUN SERPL-MCNC: 18 MG/DL (ref 7–30)
CALCIUM SERPL-MCNC: 9.4 MG/DL (ref 8.5–10.1)
CHLORIDE SERPL-SCNC: 103 MMOL/L (ref 94–109)
CO2 SERPL-SCNC: 29 MMOL/L (ref 20–32)
CREAT SERPL-MCNC: 0.82 MG/DL (ref 0.52–1.04)
ERYTHROCYTE [DISTWIDTH] IN BLOOD BY AUTOMATED COUNT: 15.6 % (ref 10–15)
GFR SERPL CREATININE-BSD FRML MDRD: 65 ML/MIN/1.7M2
GLUCOSE SERPL-MCNC: 82 MG/DL (ref 70–99)
HCT VFR BLD AUTO: 36.1 % (ref 35–47)
HGB BLD-MCNC: 12.3 G/DL (ref 11.7–15.7)
INTERPRETATION ECG - MUSE: NORMAL
MCH RBC QN AUTO: 31.5 PG (ref 26.5–33)
MCHC RBC AUTO-ENTMCNC: 34.1 G/DL (ref 31.5–36.5)
MCV RBC AUTO: 92 FL (ref 78–100)
PLATELET # BLD AUTO: 193 10E9/L (ref 150–450)
POTASSIUM SERPL-SCNC: 4.2 MMOL/L (ref 3.4–5.3)
RBC # BLD AUTO: 3.91 10E12/L (ref 3.8–5.2)
SODIUM SERPL-SCNC: 140 MMOL/L (ref 133–144)
SPECIMEN SOURCE: ABNORMAL
TROPONIN I SERPL-MCNC: 0.07 UG/L (ref 0–0.04)
WBC # BLD AUTO: 7.2 10E9/L (ref 4–11)

## 2018-05-17 PROCEDURE — 80048 BASIC METABOLIC PNL TOTAL CA: CPT | Performed by: INTERNAL MEDICINE

## 2018-05-17 PROCEDURE — 36415 COLL VENOUS BLD VENIPUNCTURE: CPT | Performed by: INTERNAL MEDICINE

## 2018-05-17 PROCEDURE — 84484 ASSAY OF TROPONIN QUANT: CPT | Performed by: INTERNAL MEDICINE

## 2018-05-17 PROCEDURE — 85027 COMPLETE CBC AUTOMATED: CPT | Performed by: INTERNAL MEDICINE

## 2018-05-17 PROCEDURE — 82565 ASSAY OF CREATININE: CPT | Performed by: INTERNAL MEDICINE

## 2018-05-17 PROCEDURE — 93005 ELECTROCARDIOGRAM TRACING: CPT

## 2018-05-17 PROCEDURE — 25000132 ZZH RX MED GY IP 250 OP 250 PS 637: Performed by: STUDENT IN AN ORGANIZED HEALTH CARE EDUCATION/TRAINING PROGRAM

## 2018-05-17 PROCEDURE — 93010 ELECTROCARDIOGRAM REPORT: CPT | Performed by: INTERNAL MEDICINE

## 2018-05-17 PROCEDURE — 99239 HOSP IP/OBS DSCHRG MGMT >30: CPT | Mod: GC | Performed by: HOSPITALIST

## 2018-05-17 PROCEDURE — 25000132 ZZH RX MED GY IP 250 OP 250 PS 637: Performed by: INTERNAL MEDICINE

## 2018-05-17 PROCEDURE — 25000132 ZZH RX MED GY IP 250 OP 250 PS 637: Performed by: HOSPITALIST

## 2018-05-17 RX ORDER — ALBUTEROL SULFATE 90 UG/1
2 AEROSOL, METERED RESPIRATORY (INHALATION) EVERY 4 HOURS PRN
Qty: 1 INHALER | Refills: 1 | Status: ON HOLD | OUTPATIENT
Start: 2018-05-17 | End: 2019-09-05

## 2018-05-17 RX ORDER — FLUTICASONE PROPIONATE 50 MCG
1 SPRAY, SUSPENSION (ML) NASAL DAILY
Qty: 1 BOTTLE | Refills: 1 | Status: ON HOLD | OUTPATIENT
Start: 2018-05-18 | End: 2019-09-05

## 2018-05-17 RX ORDER — FUROSEMIDE 20 MG
20 TABLET ORAL DAILY PRN
Qty: 30 TABLET | Refills: 3 | Status: SHIPPED | OUTPATIENT
Start: 2018-05-17 | End: 2018-05-17

## 2018-05-17 RX ORDER — FUROSEMIDE 20 MG
20 TABLET ORAL DAILY PRN
Qty: 30 TABLET | Refills: 3 | Status: ON HOLD | OUTPATIENT
Start: 2018-05-17 | End: 2019-09-05

## 2018-05-17 RX ADMIN — FLUTICASONE PROPIONATE 1 SPRAY: 50 SPRAY, METERED NASAL at 08:19

## 2018-05-17 RX ADMIN — PAROXETINE 10 MG: 10 TABLET, FILM COATED ORAL at 08:19

## 2018-05-17 RX ADMIN — FUROSEMIDE 20 MG: 20 TABLET ORAL at 08:18

## 2018-05-17 RX ADMIN — RIVAROXABAN 10 MG: 10 TABLET, FILM COATED ORAL at 08:19

## 2018-05-17 RX ADMIN — Medication 12.5 MG: at 00:30

## 2018-05-17 NOTE — PLAN OF CARE
Pt discharged to original assisted living facility. Belongings sent with pt, PIV removed. Discharge packet reviewed and sent with pt to give to caregivers at facility. Pt daughter Tracey, transporting pt.

## 2018-05-17 NOTE — PLAN OF CARE
"Problem: Patient Care Overview  Goal: Plan of Care/Patient Progress Review  Outcome: No Change  Pt is alert and oriented. Vitally stable on ra. Up with SBA. Denies pain, however c/o of \"pressure in the chest\" this am. Pt stated that she has felt the same yesterday night and the pressure subsided later in the day. Upon assessment, pt vitals are all wnl, denies nausea, dizziness, palpitations, dyspnea - however states that \"at times I feel like I can't take a deep breath all the way.\" Writer will page out to primary team this AM. No other changes. Voiding spontaneously. Slept for a few hours during the night. Call appropriately. No s/s of nose bleeds. PIV SL.  R: Continue to monitor and implement poc.       "

## 2018-05-17 NOTE — PLAN OF CARE
Problem: Cardiac Disease Comorbidity  Goal: Cardiac Disease  Patient comorbidity will be monitored for signs and symptoms of Cardiac Disease.  Problems will be absent, minimized or managed by discharge/transition of care.   Outcome: Adequate for Discharge Date Met: 05/17/18  EKG abnormal in atrial fibrillation. To follow up outpatient with cardiologist. A. Fib not new.

## 2018-05-17 NOTE — PLAN OF CARE
Problem: Patient Care Overview  Goal: Plan of Care/Patient Progress Review  Outcome: Improving  D/I  Uneventful shift, patient remains A&Ox4, but forgetful at times,  up with SBA to bedside commode. L PIV saline locked.  Will  return to assisted living facility upon discharge, possibly tomorrow.

## 2018-05-18 ENCOUNTER — CARE COORDINATION (OUTPATIENT)
Dept: CARE COORDINATION | Facility: CLINIC | Age: 83
End: 2018-05-18

## 2018-05-18 NOTE — PROGRESS NOTES
Patient was called two times with no answer and since it is Friday, post discharge follow up will be closed out

## 2018-05-19 ENCOUNTER — HOSPITAL ENCOUNTER (EMERGENCY)
Facility: CLINIC | Age: 83
Discharge: HOME | End: 2018-05-19
Attending: EMERGENCY MEDICINE | Admitting: EMERGENCY MEDICINE
Payer: COMMERCIAL

## 2018-05-19 VITALS
HEART RATE: 69 BPM | WEIGHT: 124 LBS | SYSTOLIC BLOOD PRESSURE: 139 MMHG | HEIGHT: 65 IN | BODY MASS INDEX: 20.66 KG/M2 | RESPIRATION RATE: 16 BRPM | OXYGEN SATURATION: 97 % | DIASTOLIC BLOOD PRESSURE: 72 MMHG | TEMPERATURE: 97.4 F

## 2018-05-19 DIAGNOSIS — R04.0 EPISTAXIS: ICD-10-CM

## 2018-05-19 LAB
BACTERIA SPEC CULT: NO GROWTH
BACTERIA SPEC CULT: NO GROWTH
Lab: NORMAL
Lab: NORMAL
SPECIMEN SOURCE: NORMAL
SPECIMEN SOURCE: NORMAL

## 2018-05-19 PROCEDURE — 25000132 ZZH RX MED GY IP 250 OP 250 PS 637: Performed by: EMERGENCY MEDICINE

## 2018-05-19 PROCEDURE — 25000125 ZZHC RX 250: Performed by: EMERGENCY MEDICINE

## 2018-05-19 PROCEDURE — 99283 EMERGENCY DEPT VISIT LOW MDM: CPT | Mod: 25 | Performed by: EMERGENCY MEDICINE

## 2018-05-19 PROCEDURE — 30901 CONTROL OF NOSEBLEED: CPT | Mod: 50 | Performed by: EMERGENCY MEDICINE

## 2018-05-19 RX ORDER — CEPHALEXIN 500 MG/1
500 CAPSULE ORAL 3 TIMES DAILY
Qty: 21 CAPSULE | Refills: 0 | Status: SHIPPED | OUTPATIENT
Start: 2018-05-19 | End: 2018-05-26

## 2018-05-19 RX ORDER — TRANEXAMIC ACID 100 MG/ML
500 INJECTION, SOLUTION INTRAVENOUS ONCE
Status: COMPLETED | OUTPATIENT
Start: 2018-05-19 | End: 2018-05-19

## 2018-05-19 RX ORDER — LIDOCAINE HYDROCHLORIDE AND EPINEPHRINE 10; 10 MG/ML; UG/ML
1 INJECTION, SOLUTION INFILTRATION; PERINEURAL ONCE
Status: COMPLETED | OUTPATIENT
Start: 2018-05-19 | End: 2018-05-19

## 2018-05-19 RX ADMIN — TRANEXAMIC ACID 500 MG: 100 INJECTION, SOLUTION INTRAVENOUS at 16:29

## 2018-05-19 RX ADMIN — Medication 1 SPRAY: at 16:29

## 2018-05-19 RX ADMIN — LIDOCAINE HYDROCHLORIDE AND EPINEPHRINE 1 ML: 10; 10 INJECTION, SOLUTION INFILTRATION; PERINEURAL at 16:29

## 2018-05-19 ASSESSMENT — ENCOUNTER SYMPTOMS
ABDOMINAL PAIN: 0
SHORTNESS OF BREATH: 0
FEVER: 0

## 2018-05-19 NOTE — DISCHARGE SUMMARY
[facility] 5  Discharge Summary    Brie Gonzalez  [id number]    Date of Admission:  5/13/2018  Date of Discharge:  5/17/2018   Admitting Physician:  Rene Thayer MD  Discharge Physician:  Anna Jerry  Discharging Service:  Internal Medicine, [place] [id number]           Follow up Summary:     With primary physician in 1-2 weeks for a post hospital visit.      Primary Care Physician   Mary Ellen Arevalo    Discharge Diagnoses      1] Viral bronchitis/pneumonia    2] Parainfluenzae    3] Acute on chronic HFpEF   4] Presumed CAD   5] NSTEMI - type II Demand ischemia    6] h/o afib/aflutter on anticoagulation     7] h/o CVA - no focal deficits.      History of Present Illness     Brie Gonzalez is a 89 year old female admitted on 5/13/2018. She has a history of atrial fibrillation on Rivaroxaban, CVA and is admitted for cough, shortness of breath concerning for pneumonia, now noted to have parainfluenzae. Presented with 2-3 days history of cough, shortness of breath, and fever.      Consultations This Hospital Stay       1] PT   2] OT    Hospital Course     Acute hypoxic respiratory failure (resolved):  Viral Bronchitis   Parainfluenzae   Initial Concern with Health-care associated pneumonia - lives in an assisted living facility. CT PE negative on admission. Procal negative. Now with viral panel results, d/c antibiotics. Pt's condition improved throughout hospital stay with supportive cares and diuresis. Antibiotics were discontinued on Hospital day 3 following results of procal and viral panel.  Required 2-3 liters on admission and now back on room air prior to discharge. Tolerating ambulation with minimal difficulty.  Continued to improve throughout hospital stay.  - Respiratory panel with parainfluenzae positive.    - Started on Cefepime, Azithromycin, and Vancomycin on admission. Now d/c'd 5/15. Levaquin 5/15-5/16  - CXR with ? retrocardiac opacity.   - Fluticasone for nasal  "congestion.     Elevated troponin  NSTEMI - type II Demand in the setting of hypoxia.    Atrial fibrillation/flutter:   HFpEF (acute on chronic)  -  Echocardiogram, normal LVEF, no WMA, dilated IVC on admission.  - Troponin peaked at 0.3. Likely demand in the setting of respiratory illness. No recent ischemic evaluation, likely warrants as an outpatient evaluation, but had a discussion with daughter and patient. She is hesitant to consider an angiogram given her age. They agree this evaluation may be further considered if she has any worsening symptoms.      - Continue Rivaroxaban and Metoprolol 12.5 mg XL.  - Hypervolemia now improved > responding well to PO Lasix 40 mg - brisk response with PO diuretics. Dry weight of 130 lbs. 127 on discharge with stable Cr.    - PRN Lasix 20 mg daily if weights are above 130.  Pt to discuss with family physician on following.    - Daily weights at assisted living as they were doing.      Chronic Medical conditions:   CVA- ASA 81mg.    Anxiety: Lorazepam PRN for sleep, Continue Paroxetine  HLD: continue statin.     Physical Exam   Blood pressure 144/76, pulse 65, temperature 97.6  F (36.4  C), temperature source Oral, resp. rate 18, height 1.651 m (5' 5\"), weight 57.8 kg (127 lb 8 oz), SpO2 96 %, not currently breastfeeding.    General Appearance: Elderly female, resting in bed, no acute distress.    Respiratory: Bibasilar crackles.     Cardiovascular: RRR  GI: Soft, non-tender, NA bowel sounds.    Skin: Scattered ecchymosis, no rashes.    Other:   Grossly non focal neurologic exam. Moving all extremities. Strength is grossly symmetric and normal Sensation is intact. CN II-XII intact. Oriented x 4.       Lines/Tubes:  None.    Significant Results and Procedures     See below     Echocardiogram:     Interpretation Summary  Technically difficult study.Extremely poor acoustic windows.  Limited information was obtained during study.  Global and regional left ventricular function is " normal with an EF of 55-60%.  Right ventricular function, chamber size, wall motion, and thickness are  normal.  Dilation of the inferior vena cava is present with abnormal respiratory  variation in diameter.    Pending Results   These results will be followed up by PCP and me.    Unresulted Labs Ordered in the Past 30 Days of this Admission     Date and Time Order Name Status Description    5/13/2018 2315 Blood Culture ONE site Preliminary     5/13/2018 2225 Blood Culture ONE site Preliminary           Discharge Medications   Discharge Medication List as of 5/17/2018 12:09 PM      START taking these medications    Details   fluticasone (FLONASE) 50 MCG/ACT spray Spray 1 spray into both nostrils daily, Disp-1 Bottle, R-1, E-Prescribe         CONTINUE these medications which have CHANGED    Details   albuterol (PROAIR HFA/PROVENTIL HFA/VENTOLIN HFA) 108 (90 Base) MCG/ACT Inhaler Inhale 2 puffs into the lungs every 4 hours as needed for shortness of breath / dyspnea or wheezing, Disp-1 Inhaler, R-1, E-PrescribeOkay to use as needed and have at bedside.      furosemide (LASIX) 20 MG tablet Take 1 tablet (20 mg) by mouth daily as needed, Disp-30 tablet, R-3, E-Prescribe         CONTINUE these medications which have NOT CHANGED    Details   !! sodium chloride (OCEAN) 0.65 % nasal spray Spray 2 sprays into both nostrils 2 times daily as needed for other (nasal dryness), Historical      !! ACETAMINOPHEN PO Take 650 mg by mouth daily , Historical      !! ACETAMINOPHEN PO Take 325 mg by mouth every 4 hours as needed for pain, Historical      METOPROLOL SUCCINATE ER PO Take 12.5 mg by mouth daily, Historical      PAROXETINE HCL PO Take 10 mg by mouth daily, Historical      Rivaroxaban (XARELTO PO) Take 10 mg by mouth daily, Historical      !! sodium chloride (OCEAN) 0.65 % nasal spray Spray 1 spray into both nostrils every 2 hours, Disp-1 Bottle, R-0, Local Print      VITAMIN D, CHOLECALCIFEROL, PO Take 1,000 Units by mouth  daily, Historical       !! - Potential duplicate medications found. Please discuss with provider.      STOP taking these medications       azithromycin (ZITHROMAX) 250 MG tablet Comments:   Reason for Stopping:             Discharge Orders     Discharge Procedure Orders  Reason for your hospital stay   Order Comments: Viral bronchitis, exacerbation of HFpEF     Adult [facility] Follow-up and recommended labs and tests   Order Comments: Follow up with primary care provider, Mary Ellen Arevalo, within 7 days to evaluate medication change and for hospital follow- up.  The following labs/tests are recommended: basic metabolic panel..      Appointments on [facility] and/or [facility] (with [facility] or [facility] provider or service). Call [phone] if you haven't heard regarding these appointments within 7 days of discharge.     Activity   Order Comments: Your activity upon discharge: activity as tolerated   Order Specific Question Answer Comments   Is discharge order? Yes      Monitor and record   Order Comments: weight every day and adjust diuretics as instructed.     Discharge Instructions   Order Comments: Follow-up with primary care doctor in 1-2 weeks for medication change evaluation and posthospital follow-up.    He can use her albuterol inhaler as needed.  We yourself daily and if her weight is above 130 pounds he can take an as needed dose of Lasix 20 mg.  Please discuss this medication change with her primary care doctor when you follow-up.     DNR/DNI     Diet   Order Comments: Follow this diet upon discharge: Orders Placed This Encounter     Room Service     Combination Diet Regular Diet Adult   Order Specific Question Answer Comments   Is discharge order? Yes         Allergies   Allergies   Allergen Reactions     Penicillins Swelling     Data   Most Recent 3 CBC's:  Recent Labs   Lab Test  05/17/18   0606  05/16/18   0842  05/15/18   0544   WBC  7.2  6.7  6.9   HGB  12.3  11.9  11.5*   MCV  92  95  93   PLT  193   156  132*      Most Recent 3 BMP's:  Recent Labs   Lab Test  05/17/18   0606  05/16/18   0842  05/15/18   0544   NA  140  139  140   POTASSIUM  4.2  4.5  3.6   CHLORIDE  103  102  103   CO2  29  31  30   BUN  18  18  14   CR  0.82  0.82  0.81   ANIONGAP  9  6  7   THIERRY  9.4  8.9  8.5   GLC  82  112*  102*     Most Recent 2 LFT's:  Recent Labs   Lab Test  05/13/18   2222  01/01/18   0057   AST  28  46*   ALT  25  32   ALKPHOS  95  66   BILITOTAL  1.3  0.8     Most Recent INR's and Anticoagulation Dosing History:  Anticoagulation Dose History     Recent Dosing and Labs Latest Ref Rng & Units 11/8/2017 12/5/2017 12/6/2017 12/30/2017 12/31/2017 1/1/2018 2/24/2018    Warfarin 2.5 mg - - 2.5 mg 2.5 mg 2.5 mg - - -    INR 0.86 - 1.14 2.39(H) 2.32(H) 2.57(H) 2.03(H) 2.74(H) 3.26(H) 1.32(H)        Most Recent 3 Troponin's:  Recent Labs   Lab Test  05/17/18   0606  05/14/18   1028  05/14/18   0534   TROPI  0.068*  0.299*  0.355*     Most Recent Cholesterol Panel:No lab results found.  Most Recent 6 Bacteria Isolates From Any Culture (See EPIC Reports for Culture Details):  Recent Labs   Lab Test  05/15/18   1000  05/15/18   0340  05/14/18   0032  05/13/18   2314  05/13/18   2222  01/01/18   0209   CULT  Heavy growth  Candida albicans / dubliniensis  Candida albicans and Candida dubliniensis are not routinely speciated  Susceptibility testing not routinely done  *  Canceled, Test credited  >10 Squamous epithelial cells/low power field indicates oral contamination. Please   recollect.  *  Notification of test cancellation was given to  MICHELE LUNDBERG RN @0419 5/15/18. SC[facility]    10,000 to 50,000 colonies/mL  mixed urogenital isabella  Susceptibility testing not routinely done    No growth after 5 days  No growth after 5 days  10,000 to 50,000 colonies/mL  mixed urogenital isabella  Susceptibility testing not routinely done       Most Recent TSH, T4 and A1c Labs:No lab results found.  Results for orders placed or performed during the  hospital encounter of 05/13/18   Chest  XR, 1 view portable    Narrative    XR CHEST PORT 1 VW 5/13/2018 10:35 PM    CLINICAL HISTORY: sob;     COMPARISON: 3/3/2018    FINDINGS: Cardiomediastinal silhouette is within normal limits.  Bilateral calcified pleural plaques are present and appear unchanged.  Increased left retrocardiac and left basilar pulmonary opacity. Small  left pleural effusion. No pneumothorax.      Impression    IMPRESSION:  1. Increased left retrocardiac and left basilar pulmonary opacities  may represent atelectasis or infection.  2. Stable small left pleural effusion/blunting of the costophrenic  angle.    I have personally reviewed the examination and initial interpretation  and I agree with the findings.    ANNA ROD MD   Chest CT, IV contrast only - PE protocol    Narrative    Examination:  TEMPORARY 5/14/2018 12:22 AM     Comparison: None.    History: Fever, dyspnea and hypoxia. Possible pneumonia versus PE.    TECHNIQUE: Volumetric helical acquisition of CT images of the chest  from the lung apices to the kidneys were acquired in arterial phase  after the administration of IV contrast. Contrast: 53 cc of  Isovue-370. Three-dimensional (3D) post-processed angiographic images  were reconstructed, archived to PACS and used in the interpretation of  this study.    FINDINGS:    Chest:    There is adequate opacification of the main and lobar pulmonary  arteries. No filling defect in the lobar and main segmental pulmonary  arteries to suggest pulmonary embolism. Right ventricle is enlarged  relative to the left ventricle. Right atrium is notably enlarged.  There is reflux of contrast into the hepatic veins.    Heart is enlarged. No pericardial effusion. Mediastinal adenopathy.  Bilateral hilar soft tissue thickening. Scattered bilateral  groundglass nodules for example in the right upper lobe series 9 image  93. Consolidative and groundglass nodularity at the lung bases  bilaterally, left  greater than right. Small right pleural effusion.  Moderate left pleural effusion. Calcified pleural plaques. Simple cyst  in right lobe of liver. Upper abdomen is otherwise unremarkable.    Bones and soft tissues: Degenerative changes of the spine. No  aggressive osseous lesions.      Impression    IMPRESSION:   1. No pulmonary embolism.  2. Persistent versus increased right atrial enlargement with signs of  right heart dysfunction.  3. Scattered groundglass and consolidative opacities most predominant  at the left base with pleural effusions left greater than right.  Although could be compressive atelectasis or pneumonia would still be  in the differential.    I have personally reviewed the examination and initial interpretation  and I agree with the findings.    BVEERLY GARCIA MD     Discharge Disposition   Discharged to assisted living  Condition at discharge: Stable  Code Status   DNR / DNI    Pt was seen and discussed with Dr. Claritza Bush MD    PGY3  [phone]

## 2018-05-19 NOTE — DISCHARGE INSTRUCTIONS
"Please make an appointment to follow up with [facility] (phone: [phone]) in 5 days for packing removal.      Discharge Instructions  Epistaxis    Today you were seen for a nosebleed.   Nosebleeds (the medical term is \"epistaxis\") are very common. Almost every person has had at least one in their lifetime.  Although the amount of blood loss can appear dramatic, nosebleeds rarely cause serious problems.  The most common causes are dry air or nose picking, but they also are common in people who have allergies, high blood pressure or are on blood thinners, such as Coumadin, Aspirin or Plavix. If you or your child gets a nosebleed, the important thing is to know how to take care of it. With the right self-care, most nosebleeds will stop on their own.    Return to the Emergency Department if:    Your nose is bleeding a very large amount of blood.    You get very pale, faint, or tired.    You cannot get the bleeding to stop after following these instructions.    A nosebleed happens after recent nasal surgery or if you have a known nasal tumor.    You have new, serious symptoms, such as chest pain.    You get a nosebleed after an injury, such as being hit in the face, and you are concerned that you could have other injuries (like a broken bone).    Treatment:    Your doctor may tell you to use a decongestant nose spray, like Afrin  (oxymetazoline), in both nostrils in the morning and at night for the next three days. Do not use this medicine for more than three days at a time.  If you do it will cause nasal congestion.     You should apply a small amount of Vaseline  to the inside of your nostrils for moisture before bed.  Using a humidifier in your bedroom will help as well.    For the next three days, do not blow your nose or put anything in your nose. You may sniffle, or dab the outside of your nose.    Do not bend with your head below your waist for the next three days. Do not lift anything so " heavy that you have to strain.     If you received nasal packing, please do not remove the packing until seen by an Ear Nose and Throat specialist.  If antibiotics have been given with the packing please take as directed.    If your nose starts to bleed again:    Blow your nose to get rid of some of the clots that have formed inside your nostrils. This may increase the bleeding temporarily, but that's OK.    Spray decongestant nose spray (like Afrin ) into both nostrils to constrict the blood vessels.    Sit or stand while bending forward slightly at the waist. Do not lie down or tilt your head back. This may cause you to swallow blood and can lead to vomiting.     the soft part of BOTH nostrils at the bottom of your nose and squeeze your nose closed for at least 5 minutes (for children) or 10 to 15 minutes (for adults). You can use your fingers, or the clip we gave you here. Use a clock to time yourself. Do not release the pressure every so often to check whether the bleeding has stopped. Many people hurt their chances of stopping the bleeding by releasing the pressure too soon or too often.    If you follow the steps outlined above, and your nose continues to bleed, repeat all the steps once more. Apply pressure for a total of at least 30 minutes. If you continue to bleed even then, return to the Emergency Department or go to an urgent care clinic.    If you keep having nose bleeds, schedule an appointment with your regular doctor, or with an Ear, Nose, and Throat Specialist.  If you were given a prescription for medicine here today, be sure to read all of the information (including the package insert) that comes with your prescription.  This will include important information about the medicine, its side effects, and any warnings that you need to know about.  The pharmacist who fills the prescription can provide more information and answer questions you may have about the medicine.  If you have questions or  concerns that the pharmacist cannot address, please call or return to the Emergency Department.

## 2018-05-19 NOTE — ED AVS SNAPSHOT
" [facility], Emergency Department    [address] [address]    Phone:  [phone]                                       Brie Gonzalez   MRN: [id number]    Department:  [facility], Emergency Department   Date of Visit:  5/19/2018           Patient Information     Date Of Birth          7/8/1925        Your diagnoses for this visit were:     Epistaxis        You were seen by Greg Fleming MD.        Discharge Instructions       Please make an appointment to follow up with [facility] (phone: [phone]) in 5 days for packing removal.      Discharge Instructions  Epistaxis    Today you were seen for a nosebleed.   Nosebleeds (the medical term is \"epistaxis\") are very common. Almost every person has had at least one in their lifetime.  Although the amount of blood loss can appear dramatic, nosebleeds rarely cause serious problems.  The most common causes are dry air or nose picking, but they also are common in people who have allergies, high blood pressure or are on blood thinners, such as Coumadin, Aspirin or Plavix. If you or your child gets a nosebleed, the important thing is to know how to take care of it. With the right self-care, most nosebleeds will stop on their own.    Return to the Emergency Department if:    Your nose is bleeding a very large amount of blood.    You get very pale, faint, or tired.    You cannot get the bleeding to stop after following these instructions.    A nosebleed happens after recent nasal surgery or if you have a known nasal tumor.    You have new, serious symptoms, such as chest pain.    You get a nosebleed after an injury, such as being hit in the face, and you are concerned that you could have other injuries (like a broken bone).    Treatment:    Your doctor may tell you to use a decongestant nose spray, like Afrin  (oxymetazoline), in both nostrils in the morning and at night for the next three days. Do not use this medicine for " more than three days at a time.  If you do it will cause nasal congestion.     You should apply a small amount of Vaseline  to the inside of your nostrils for moisture before bed.  Using a humidifier in your bedroom will help as well.    For the next three days, do not blow your nose or put anything in your nose. You may sniffle, or dab the outside of your nose.    Do not bend with your head below your waist for the next three days. Do not lift anything so heavy that you have to strain.     If you received nasal packing, please do not remove the packing until seen by an Ear Nose and Throat specialist.  If antibiotics have been given with the packing please take as directed.    If your nose starts to bleed again:    Blow your nose to get rid of some of the clots that have formed inside your nostrils. This may increase the bleeding temporarily, but that's OK.    Spray decongestant nose spray (like Afrin ) into both nostrils to constrict the blood vessels.    Sit or stand while bending forward slightly at the waist. Do not lie down or tilt your head back. This may cause you to swallow blood and can lead to vomiting.     the soft part of BOTH nostrils at the bottom of your nose and squeeze your nose closed for at least 5 minutes (for children) or 10 to 15 minutes (for adults). You can use your fingers, or the clip we gave you here. Use a clock to time yourself. Do not release the pressure every so often to check whether the bleeding has stopped. Many people hurt their chances of stopping the bleeding by releasing the pressure too soon or too often.    If you follow the steps outlined above, and your nose continues to bleed, repeat all the steps once more. Apply pressure for a total of at least 30 minutes. If you continue to bleed even then, return to the Emergency Department or go to an urgent care clinic.    If you keep having nose bleeds, schedule an appointment with your regular doctor, or with an Ear, Nose, and  Throat Specialist.  If you were given a prescription for medicine here today, be sure to read all of the information (including the package insert) that comes with your prescription.  This will include important information about the medicine, its side effects, and any warnings that you need to know about.  The pharmacist who fills the prescription can provide more information and answer questions you may have about the medicine.  If you have questions or concerns that the pharmacist cannot address, please call or return to the Emergency Department.         24 Hour Appointment Hotline       To make an appointment at any [facility], call [phone] ([phone]). If you don't have a family doctor or clinic, we will help you find one. [place] clinics are conveniently located to serve the needs of you and your family.             Review of your medicines      START taking        Dose / Directions Last dose taken    cephALEXin 500 MG capsule   Commonly known as:  KEFLEX   Dose:  500 mg   Quantity:  21 capsule        Take 1 capsule (500 mg) by mouth 3 times daily for 7 days   Refills:  0          Our records show that you are taking the medicines listed below. If these are incorrect, please call your family doctor or clinic.        Dose / Directions Last dose taken    * ACETAMINOPHEN PO   Dose:  650 mg        Take 650 mg by mouth daily   Refills:  0        * ACETAMINOPHEN PO   Dose:  325 mg        Take 325 mg by mouth every 4 hours as needed for pain   Refills:  0        albuterol 108 (90 Base) MCG/ACT Inhaler   Commonly known as:  PROAIR HFA/PROVENTIL HFA/VENTOLIN HFA   Dose:  2 puff   Quantity:  1 Inhaler        Inhale 2 puffs into the lungs every 4 hours as needed for shortness of breath / dyspnea or wheezing   Refills:  1        fluticasone 50 MCG/ACT spray   Commonly known as:  FLONASE   Dose:  1 spray   Quantity:  1 Bottle        Spray 1 spray into both nostrils daily   Refills:  1        furosemide 20  MG tablet   Commonly known as:  LASIX   Dose:  20 mg   Quantity:  30 tablet        Take 1 tablet (20 mg) by mouth daily as needed   Refills:  3        METOPROLOL SUCCINATE ER PO   Dose:  12.5 mg        Take 12.5 mg by mouth daily   Refills:  0        PAROXETINE HCL PO   Dose:  10 mg        Take 10 mg by mouth daily   Refills:  0        * sodium chloride 0.65 % nasal spray   Commonly known as:  OCEAN   Dose:  2 spray        Spray 2 sprays into both nostrils 2 times daily as needed for other (nasal dryness)   Refills:  0        * sodium chloride 0.65 % nasal spray   Commonly known as:  OCEAN   Dose:  1 spray   Quantity:  1 Bottle        Spray 1 spray into both nostrils every 2 hours   Refills:  0        VITAMIN D (CHOLECALCIFEROL) PO   Dose:  1000 Units        Take 1,000 Units by mouth daily   Refills:  0        XARELTO PO   Dose:  10 mg        Take 10 mg by mouth daily   Refills:  0        * Notice:  This list has 4 medication(s) that are the same as other medications prescribed for you. Read the directions carefully, and ask your doctor or other care provider to review them with you.            Prescriptions were sent or printed at these locations (1 Prescription)                   Other Prescriptions                Printed at Department/Unit printer (1 of 1)         cephALEXin (KEFLEX) 500 MG capsule                Orders Needing Specimen Collection     None      Pending Results     No orders found from 5/17/2018 to 5/20/2018.            Pending Culture Results     No orders found from 5/17/2018 to 5/20/2018.            Pending Results Instructions     If you had any lab results that were not finalized at the time of your Discharge, you can call the ED Lab Result RN at [phone]. You will be contacted by this team for any positive Lab results or changes in treatment. The nurses are available 7 days a week from 10A to 6:30P.  You can leave a message 24 hours per day and they will return your call.        Thank  "you for choosing [facility]       Thank you for choosing [facility] for your care. Our goal is always to provide you with excellent care. Hearing back from our patients is one way we can continue to improve our services. Please take a few minutes to complete the written survey that you may receive in the mail after you visit with us. Thank you!        [employer] Information     [employer] lets you send messages to your doctor, view your test results, renew your prescriptions, schedule appointments and more. To sign up, go to [email or web address] . Click on \"Log in\" on the left side of the screen, which will take you to the Welcome page. Then click on \"Sign up Now\" on the right side of the page.     You will be asked to enter the access code listed below, as well as some personal information. Please follow the directions to create your username and password.     Your access code is: [email or web address]  Expires: 2018  5:52 PM     Your access code will  in 90 days. If you need help or a new code, please call your [facility] clinic or [phone].        Care EveryWhere ID     This is your Care EveryWhere ID. This could be used by other organizations to access your [facility] medical records  [id number]        Equal Access to Services     MOHIT REMY : Morgan Rivera, waaxda luqadaha, qaybta kaalmada adealbertoyada, ana dockery. So [facility] [phone].    ATENCIÓN: Si habla español, tiene a posada disposición servicios gratuitos de asistencia lingüística. Llame al [phone].    We comply with applicable federal civil rights laws and [place] laws. We do not discriminate on the basis of race, color, national origin, age, disability, sex, sexual orientation, or gender identity.            After Visit Summary       This is your record. Keep this with you and show to your community pharmacist(s) and doctor(s) at your next visit.                  "

## 2018-05-19 NOTE — ED AVS SNAPSHOT
[facility], [place], Emergency Department    [address] [place] [address]    Phone:  [phone]                                       Brie Gonzalez   MRN: [id number]    Department:  [facility], Emergency Department   Date of Visit:  5/19/2018           After Visit Summary Signature Page     I have received my discharge instructions, and my questions have been answered. I have discussed any challenges I see with this plan with the nurse or doctor.    ..........................................................................................................................................  Patient/Patient Representative Signature      ..........................................................................................................................................  Patient Representative Print Name and Relationship to Patient    ..................................................               ................................................  Date                                            Time    ..........................................................................................................................................  Reviewed by Signature/Title    ...................................................              ..............................................  Date                                                            Time

## 2018-05-19 NOTE — ED TRIAGE NOTES
Arrived to ED d/t nosebleeds, bleeding started about 1 pm, has prior hx of nosebleeds in past, on Xarelto for a-fib, nose clip placed on in triage, VSS

## 2018-05-19 NOTE — ED PROVIDER NOTES
History     Chief Complaint   Patient presents with     Epistaxis     HPI  Brie Gonzalez is a 89 year old female with history of atrial fibrillation on Xarelto, prior CVA, HFpEF, who presents to the emergency department today for evaluation of epistaxis.  The patient notably was hospitalized here 5/13-5/17 for cough and shortness of breath.  She was found with parainfluenza pneumonia, complicated by demand ischemic NSTEMI.  The patient states that she was doing well since her discharge 2 days ago until this morning, when she spontaneously developed nosebleed while she was getting her mail, first out of the left nostril before progressing to both.  She attempted pinching her nose at home for several minutes but bleeding persisted, prompting arrival this afternoon with her daughter.  The patient was provided a nose clamp in triage here and states that since applying this her nosebleed has improved greatly. She initially was having postnasal bleeding as well, but this has since improved as well. The patient and daughter note that the patient was developing nosebleeds when she was on Coumadin in November of last year.  She states that her last nosebleed prior to today was in December.  The patient was switched from Coumadin to Xarelto in February, and her atrial fibrillation has been well controlled recently.    No current facility-administered medications for this encounter.      Current Outpatient Prescriptions   Medication     cephALEXin (KEFLEX) 500 MG capsule     ACETAMINOPHEN PO     ACETAMINOPHEN PO     albuterol (PROAIR HFA/PROVENTIL HFA/VENTOLIN HFA) 108 (90 Base) MCG/ACT Inhaler     fluticasone (FLONASE) 50 MCG/ACT spray     furosemide (LASIX) 20 MG tablet     METOPROLOL SUCCINATE ER PO     PAROXETINE HCL PO     Rivaroxaban (XARELTO PO)     sodium chloride (OCEAN) 0.65 % nasal spray     sodium chloride (OCEAN) 0.65 % nasal spray     VITAMIN D, CHOLECALCIFEROL, PO     Past Medical History:   Diagnosis Date      "Anxiety 2.5 years ago     Arthritis Hands for years     Atrial fibrillation (H)      Cerebral infarction (H) 4-5 years ago     Depressive disorder 2.5 years ago intermittent     Hypertension 4-5 years ago       Past Surgical History:   Procedure Laterality Date     ADENOIDECTOMY  1930's     GYN SURGERY   1970's uterine fibroids     TONSILLECTOMY  1930's       Family History   Problem Relation Age of Onset     Dementia Mother      CEREBROVASCULAR DISEASE Mother        Social History   Substance Use Topics     Smoking status: Never Smoker     Smokeless tobacco: Never Used     Alcohol use Yes      Comment: Rare     Allergies   Allergen Reactions     Penicillins Swelling       I have reviewed the Medications, Allergies, Past Medical and Surgical History, and Social History in the Epic system.    Review of Systems   Constitutional: Negative for fever.   HENT: Positive for nosebleeds (improved from onset).    Respiratory: Negative for shortness of breath.    Cardiovascular: Negative for chest pain.   Gastrointestinal: Negative for abdominal pain.   All other systems reviewed and are negative.      Physical Exam   BP: 139/72  Pulse: 69  Temp: 97.4  F (36.3  C)  Resp: 14  Height: 165.1 cm (5' 5\")  Weight: 56.2 kg (124 lb)  SpO2: 97 %      Physical Exam   Constitutional: She is oriented to person, place, and time. She appears well-developed and well-nourished. No distress.   HENT:   Head: Normocephalic and atraumatic.   Nose: Epistaxis (Pulsatile bleeding from the inferior anterior nasal septum) is observed.   Eyes: No scleral icterus.   Neck: Normal range of motion. Neck supple.   Neurological: She is alert and oriented to person, place, and time.   Skin: Skin is warm and dry. No rash noted. She is not diaphoretic. No erythema. No pallor.       ED Course     ED Course     Procedures             Critical Care time:  none             Labs Ordered and Resulted from Time of ED Arrival Up to the Time of Departure from the ED - " No data to display         Assessments & Plan (with Medical Decision Making)   The patient had a nosebleed which is well-controlled with pressure applied however returns as soon as pressures removed.  She is on Xarelto and took her last dose this morning.  The patient has had multiple nosebleeds in the past and has generally required nasal packing which she had wanted to avoid.  Because of the recurrent bleeding she did consent to packing.  I did speak with ENT who agreed with the plan to premedicate with TXA after spraying with Afrin, lidocaine and epinephrine.  The Rhino Rocket was also soaked with TXA and once applied which the patient tolerated well except for moderate discomfort on introduction because of a large turbinate she had complete resolution of the bleeding.  She is observed for another 30 minutes.  I checked the pressure again prior to discharge and it appears to be working even at low inflation with 2.5 cc.  The patient was started on Keflex and will be discharged back to her assisted living.    I have reviewed the nursing notes.    I have reviewed the findings, diagnosis, plan and need for follow up with the patient.    New Prescriptions    CEPHALEXIN (KEFLEX) 500 MG CAPSULE    Take 1 capsule (500 mg) by mouth 3 times daily for 7 days       Final diagnoses:   Epistaxis     IPlacido, am serving as a trained medical scribe to document services personally performed by Greg Fleming MD, based on the provider's statements to me.      Greg WIHTT MD, was physically present and have reviewed and verified the accuracy of this note documented by Placido Reddy.    5/19/2018   [facility], [place], EMERGENCY DEPARTMENT     Greg Fleming MD  05/19/18 [id number]

## 2018-05-21 NOTE — TELEPHONE ENCOUNTER
FUTURE VISIT INFORMATION      FUTURE VISIT INFORMATION:    Date: 05/25/2018    Time: 2:30    Location: [facility]  REFERRAL INFORMATION:    Referring provider:  SELF    Referring providers clinic:  N/A    Reason for visit/diagnosis  rhino rocket removed    RECORDS REQUESTED FROM:       Clinic name Comments Records Status Imaging Status   [facility] ED OFFICE VISIT: 05/19/2018, 12/11/2017, 12/05/2017 AND 11/19/2017 INTERNAL NONE                                   RECORDS STATUS

## 2018-05-25 ENCOUNTER — PRE VISIT (OUTPATIENT)
Dept: OTOLARYNGOLOGY | Facility: CLINIC | Age: 83
End: 2018-05-25

## 2018-05-25 ENCOUNTER — OFFICE VISIT (OUTPATIENT)
Dept: OTOLARYNGOLOGY | Facility: CLINIC | Age: 83
End: 2018-05-25
Payer: COMMERCIAL

## 2018-05-25 VITALS — BODY MASS INDEX: 21.16 KG/M2 | HEIGHT: 65 IN | WEIGHT: 127 LBS

## 2018-05-25 DIAGNOSIS — R04.0 EPISTAXIS: Primary | ICD-10-CM

## 2018-05-25 ASSESSMENT — PAIN SCALES - GENERAL: PAINLEVEL: MILD PAIN (3)

## 2018-05-25 NOTE — MR AVS SNAPSHOT
"              After Visit Summary   5/25/2018    Brie Gonzalez    MRN: [id number]           Patient Information     Date Of Birth          7/8/1925        Visit Information        Provider Department      5/25/2018 2:30 PM Vanesa Orozco MD [facility] Ear Nose [facility] Thro[facility]        Today's Diagnoses     Epistaxis    -  1      Care Instructions    1.  You were seen in the ENT Clinic today by Dr. Orzoco.  If you have any questions or concerns after your appointment, please call [phone].  Press option #1 for scheduling related needs.  Press option #3 for Nurse advice.  2.  Plan is to return to clinic as needed.               Follow-ups after your visit        Who to contact     Please call your clinic at [phone] to:    Ask questions about your health    Make or cancel appointments    Discuss your medicines    Learn about your test results    Speak to your doctor            Additional Information About Your Visit        Care EveryWhere ID     This is your Care EveryWhere ID. This could be used by other organizations to access your [facility] medical records  [id number]        Your Vitals Were     Height BMI (Body Mass Index)                1.651 m (5' 5\") 21.13 kg/m2           Blood Pressure from Last 3 Encounters:   05/19/18 139/72   05/17/18 144/76   03/03/18 156/67    Weight from Last 3 Encounters:   05/25/18 57.6 kg (127 lb)   05/19/18 56.2 kg (124 lb)   05/16/18 57.8 kg (127 lb 8 oz)              Today, you had the following     No orders found for display         Today's Medication Changes          These changes are accurate as of 5/25/18 11:59 PM.  If you have any questions, ask your nurse or doctor.               These medicines have changed or have updated prescriptions.        Dose/Directions    * sodium chloride 0.65 % nasal spray   Commonly known as:  OCEAN   This may have changed:  Another medication with the same name was changed. Make sure you understand how and when to take each.        Dose: "  2 spray   Spray 2 sprays into both nostrils 2 times daily as needed for other (nasal dryness)   Refills:  0       * sodium chloride 0.65 % nasal spray   Commonly known as:  OCEAN   This may have changed:  when to take this   Used for:  Epistaxis        Dose:  1 spray   Spray 1 spray into both nostrils every 2 hours   Quantity:  1 Bottle   Refills:  0       * Notice:  This list has 2 medication(s) that are the same as other medications prescribed for you. Read the directions carefully, and ask your doctor or other care provider to review them with you.             Primary Care Provider Office Phone # Fax #    Mary Ellen ArevaloAILEEN [phone]       [facility] PHYS[facility]IAN [facility] [address]   [facility] [address]        Equal Access to Services     MOHIT REMY : Morgan rodriguezo Soconrado, waaxda luqadaha, qaybta kaalmada adeegyada, ana lebron . So [facility] [phone].    ATENCIÓN: Si habla español, tiene a posada disposición servicios gratuitos de asistencia lingüística. [facility] [phone].    We comply with applicable federal civil rights laws and [place] laws. We do not discriminate on the basis of race, color, national origin, age, disability, sex, sexual orientation, or gender identity.            Thank you!     Thank you for choosing [facility] EAR NOSE AND THROAT  for your care. Our goal is always to provide you with excellent care. Hearing back from our patients is one way we can continue to improve our services. Please take a few minutes to complete the written survey that you may receive in the mail after your visit with us. Thank you!             Your Updated Medication List - Protect others around you: Learn how to safely use, store and throw away your medicines at www.disposemymeds.org.          This list is accurate as of 5/25/18 11:59 PM.  Always use your most recent med list.                   Brand Name Dispense Instructions for use Diagnosis    *  ACETAMINOPHEN PO      Take 650 mg by mouth daily        * ACETAMINOPHEN PO      Take 325 mg by mouth every 4 hours as needed for pain        albuterol 108 (90 Base) MCG/ACT Inhaler    PROAIR HFA/PROVENTIL HFA/VENTOLIN HFA    1 Inhaler    Inhale 2 puffs into the lungs every 4 hours as needed for shortness of breath / dyspnea or wheezing    Viral bronchitis       cephALEXin 500 MG capsule    KEFLEX    21 capsule    Take 1 capsule (500 mg) by mouth 3 times daily for 7 days        fluticasone 50 MCG/ACT spray    FLONASE    1 Bottle    Spray 1 spray into both nostrils daily    Viral bronchitis       furosemide 20 MG tablet    LASIX    30 tablet    Take 1 tablet (20 mg) by mouth daily as needed    Heart failure with preserved ejection fraction, NYHA class II (H)       METOPROLOL SUCCINATE ER PO      Take 12.5 mg by mouth daily        PAROXETINE HCL PO      Take 10 mg by mouth daily        * sodium chloride 0.65 % nasal spray    OCEAN     Spray 2 sprays into both nostrils 2 times daily as needed for other (nasal dryness)        * sodium chloride 0.65 % nasal spray    OCEAN    1 Bottle    Spray 1 spray into both nostrils every 2 hours    Epistaxis       VITAMIN D (CHOLECALCIFEROL) PO      Take 1,000 Units by mouth daily        XARELTO PO      Take 10 mg by mouth daily        * Notice:  This list has 4 medication(s) that are the same as other medications prescribed for you. Read the directions carefully, and ask your doctor or other care provider to review them with you.

## 2018-05-25 NOTE — PROGRESS NOTES
CC: left epistaxis    HPI:  HISTORY OF PRESENT ILLNESS:  The patient presents to clinic today for packing removal.  She experienced left-sided epistaxis approximately six days ago, requiring a trip to the emergency room with packing.  The patient does have a history of epistaxis.  She was previously seen in our clinic in December of 2017 with Dr. Sargent.  At that time, she had bilateral packing in.  The patient is on Xarelto.  Typically, she does have some small nosebleeds that she is able to control with Afrin and pressure, but this time, she was not able to get good control of bleeding with Afrin and pressure.  The patient's nasal history is significant for using Afrin since being hospitalized for the flu.  With the flu, she did have a fair amount of nasal congestion.  She has been using nasal saline spray on the right side.  She has not noticed any bleeding since the packing was placed.      PHYSICAL EXAMINATION:   GENERAL:  No acute distress.   NOSE:  The Rhinorocket on the left naris was removed.  The nose was suctioned out.  I examined the nasal cavity bilaterally.  I did not see any evidence of active bleeding.  She does have some prominent blood vessels on the left anterior septum, but I manipulated the area with Q-tips and was not able to get any bleeding.      ASSESSMENT AND PLAN:  The patient presents with epistaxis and packing removal.  I was able to successfully remove the packing today.  We did not see any evidence of bleeding.  I think it would be good for her to discontinue the Flonase as it can precipitate nosebleeds due to the thinning of the lining.  We will have her use nasal saline on both sides three times a day.  Finally, she can use Afrin and nasal pressure if she has small nosebleeds.  If she does have a return of nosebleeds, she may see us in the clinic and I can try to find the source of bleeding and cauterization with silver nitrate.     I spent a total of 25 minutes face-to-face with Brie  JOE Gonzalez during today's office visit.  Over 50% of this time was spent counseling the patient on and/or coordinating care as documented in my assessment and plan.

## 2018-05-25 NOTE — NURSING NOTE
Chief Complaint   Patient presents with     Consult     rhino rocket removed seen on ER by Reyna Church Medical Assistant

## 2018-05-25 NOTE — LETTER
5/25/2018     RE: Brie Gonzalez  [facility]  [address]  [place] [address]     Dear Colleague,    Thank you for referring your patient, Brie Gonzalez, to the [facility] EAR NOSE AND THROAT at [facility]. Please see a copy of my visit note below.    CC: left epistaxis    HPI:  HISTORY OF PRESENT ILLNESS:  The patient presents to clinic today for packing removal.  She experienced left-sided epistaxis approximately six days ago, requiring a trip to the emergency room with packing.  The patient does have a history of epistaxis.  She was previously seen in our clinic in December of 2017 with Dr. Sargent.  At that time, she had bilateral packing in.  The patient is on Xarelto.  Typically, she does have some small nosebleeds that she is able to control with Afrin and pressure, but this time, she was not able to get good control of bleeding with Afrin and pressure.  The patient's nasal history is significant for using Afrin since being hospitalized for the flu.  With the flu, she did have a fair amount of nasal congestion.  She has been using nasal saline spray on the right side.  She has not noticed any bleeding since the packing was placed.      PHYSICAL EXAMINATION:   GENERAL:  No acute distress.   NOSE:  The Rhinorocket on the left naris was removed.  The nose was suctioned out.  I examined the nasal cavity bilaterally.  I did not see any evidence of active bleeding.  She does have some prominent blood vessels on the left anterior septum, but I manipulated the area with Q-tips and was not able to get any bleeding.      ASSESSMENT AND PLAN:  The patient presents with epistaxis and packing removal.  I was able to successfully remove the packing today.  We did not see any evidence of bleeding.  I think it would be good for her to discontinue the Flonase as it can precipitate nosebleeds due to the thinning of the lining.  We will have her use nasal saline on  both sides three times a day.  Finally, she can use Afrin and nasal pressure if she has small nosebleeds.  If she does have a return of nosebleeds, she may see us in the clinic and I can try to find the source of bleeding and cauterization with silver nitrate.     I spent a total of 25 minutes face-to-face with Brie Gonzalez during today's office visit.  Over 50% of this time was spent counseling the patient on and/or coordinating care as documented in my assessment and plan.    Again, thank you for allowing me to participate in the care of your patient.      Sincerely,    Vanesa Orozco MD

## 2018-05-25 NOTE — PATIENT INSTRUCTIONS
1.  You were seen in the ENT Clinic today by Dr. Orozco.  If you have any questions or concerns after your appointment, please call [phone].  Press option #1 for scheduling related needs.  Press option #3 for Nurse advice.  2.  Plan is to return to clinic as needed.

## 2019-09-01 ENCOUNTER — APPOINTMENT (OUTPATIENT)
Dept: CT IMAGING | Facility: CLINIC | Age: 84
DRG: 064 | End: 2019-09-01
Attending: EMERGENCY MEDICINE
Payer: COMMERCIAL

## 2019-09-01 ENCOUNTER — HOSPITAL ENCOUNTER (INPATIENT)
Facility: CLINIC | Age: 84
LOS: 4 days | Discharge: HOSPICE | DRG: 064 | End: 2019-09-05
Attending: EMERGENCY MEDICINE | Admitting: PSYCHIATRY & NEUROLOGY
Payer: COMMERCIAL

## 2019-09-01 ENCOUNTER — APPOINTMENT (OUTPATIENT)
Dept: GENERAL RADIOLOGY | Facility: CLINIC | Age: 84
DRG: 064 | End: 2019-09-01
Attending: EMERGENCY MEDICINE
Payer: COMMERCIAL

## 2019-09-01 VITALS
TEMPERATURE: 97.8 F | OXYGEN SATURATION: 97 % | DIASTOLIC BLOOD PRESSURE: 64 MMHG | HEART RATE: 48 BPM | RESPIRATION RATE: 15 BRPM | SYSTOLIC BLOOD PRESSURE: 146 MMHG

## 2019-09-01 DIAGNOSIS — I63.411 CEREBROVASCULAR ACCIDENT (CVA) DUE TO EMBOLISM OF RIGHT MIDDLE CEREBRAL ARTERY (H): ICD-10-CM

## 2019-09-01 DIAGNOSIS — I63.511 ACUTE ISCHEMIC RIGHT MCA STROKE (H): Primary | ICD-10-CM

## 2019-09-01 LAB
ANION GAP SERPL CALCULATED.3IONS-SCNC: 7 MMOL/L (ref 3–14)
APTT PPP: 30 SEC (ref 22–37)
B-HCG FREE SERPL-ACNC: 1.3 [IU]/L (ref 0.86–1.14)
BASOPHILS # BLD AUTO: 0 10E9/L (ref 0–0.2)
BASOPHILS NFR BLD AUTO: 0.4 %
BUN SERPL-MCNC: 14 MG/DL (ref 7–30)
CALCIUM SERPL-MCNC: 9.1 MG/DL (ref 8.5–10.1)
CHLORIDE SERPL-SCNC: 108 MMOL/L (ref 94–109)
CK SERPL-CCNC: 213 U/L (ref 30–225)
CO2 BLDCOV-SCNC: 25 MMOL/L (ref 21–28)
CO2 SERPL-SCNC: 23 MMOL/L (ref 20–32)
CREAT BLD-MCNC: 0.8 MG/DL (ref 0.52–1.04)
CREAT SERPL-MCNC: 0.81 MG/DL (ref 0.52–1.04)
DIFFERENTIAL METHOD BLD: ABNORMAL
EOSINOPHIL # BLD AUTO: 0 10E9/L (ref 0–0.7)
EOSINOPHIL NFR BLD AUTO: 0 %
ERYTHROCYTE [DISTWIDTH] IN BLOOD BY AUTOMATED COUNT: 14.7 % (ref 10–15)
GFR SERPL CREATININE-BSD FRML MDRD: 62 ML/MIN/{1.73_M2}
GFR SERPL CREATININE-BSD FRML MDRD: 67 ML/MIN/{1.73_M2}
GLUCOSE BLDC GLUCOMTR-MCNC: 101 MG/DL (ref 70–99)
GLUCOSE SERPL-MCNC: 96 MG/DL (ref 70–99)
HCT VFR BLD AUTO: 44.8 % (ref 35–47)
HGB BLD-MCNC: 14 G/DL (ref 11.7–15.7)
IMM GRANULOCYTES # BLD: 0.1 10E9/L (ref 0–0.4)
IMM GRANULOCYTES NFR BLD: 0.5 %
LACTATE BLD-SCNC: 1.6 MMOL/L (ref 0.7–2.1)
LYMPHOCYTES # BLD AUTO: 0.9 10E9/L (ref 0.8–5.3)
LYMPHOCYTES NFR BLD AUTO: 8.7 %
MCH RBC QN AUTO: 32 PG (ref 26.5–33)
MCHC RBC AUTO-ENTMCNC: 31.3 G/DL (ref 31.5–36.5)
MCV RBC AUTO: 103 FL (ref 78–100)
MONOCYTES # BLD AUTO: 0.9 10E9/L (ref 0–1.3)
MONOCYTES NFR BLD AUTO: 8.1 %
NEUTROPHILS # BLD AUTO: 8.7 10E9/L (ref 1.6–8.3)
NEUTROPHILS NFR BLD AUTO: 82.3 %
NRBC # BLD AUTO: 0 10*3/UL
NRBC BLD AUTO-RTO: 0 /100
PCO2 BLDV: 44 MM HG (ref 40–50)
PH BLDV: 7.36 PH (ref 7.32–7.43)
PLATELET # BLD AUTO: 169 10E9/L (ref 150–450)
PO2 BLDV: 20 MM HG (ref 25–47)
POTASSIUM SERPL-SCNC: 4.6 MMOL/L (ref 3.4–5.3)
RADIOLOGIST FLAGS: ABNORMAL
RBC # BLD AUTO: 4.37 10E12/L (ref 3.8–5.2)
SAO2 % BLDV FROM PO2: 30 %
SODIUM SERPL-SCNC: 138 MMOL/L (ref 133–144)
TROPONIN I BLD-MCNC: 0.02 UG/L (ref 0–0.08)
WBC # BLD AUTO: 10.5 10E9/L (ref 4–11)

## 2019-09-01 PROCEDURE — 93010 ELECTROCARDIOGRAM REPORT: CPT | Mod: Z6 | Performed by: EMERGENCY MEDICINE

## 2019-09-01 PROCEDURE — 99285 EMERGENCY DEPT VISIT HI MDM: CPT | Mod: 25 | Performed by: EMERGENCY MEDICINE

## 2019-09-01 PROCEDURE — 82550 ASSAY OF CK (CPK): CPT | Performed by: EMERGENCY MEDICINE

## 2019-09-01 PROCEDURE — 82803 BLOOD GASES ANY COMBINATION: CPT

## 2019-09-01 PROCEDURE — 70450 CT HEAD/BRAIN W/O DYE: CPT

## 2019-09-01 PROCEDURE — 00000146 ZZHCL STATISTIC GLUCOSE BY METER IP

## 2019-09-01 PROCEDURE — 12000001 ZZH R&B MED SURG/OB UMMC

## 2019-09-01 PROCEDURE — 85610 PROTHROMBIN TIME: CPT

## 2019-09-01 PROCEDURE — 84484 ASSAY OF TROPONIN QUANT: CPT

## 2019-09-01 PROCEDURE — 80048 BASIC METABOLIC PNL TOTAL CA: CPT | Performed by: EMERGENCY MEDICINE

## 2019-09-01 PROCEDURE — 96360 HYDRATION IV INFUSION INIT: CPT | Mod: 59 | Performed by: EMERGENCY MEDICINE

## 2019-09-01 PROCEDURE — 85730 THROMBOPLASTIN TIME PARTIAL: CPT | Performed by: EMERGENCY MEDICINE

## 2019-09-01 PROCEDURE — 82565 ASSAY OF CREATININE: CPT

## 2019-09-01 PROCEDURE — 0042T CT HEAD PERFUSION WITH CONTRAST: CPT

## 2019-09-01 PROCEDURE — 25800030 ZZH RX IP 258 OP 636: Performed by: EMERGENCY MEDICINE

## 2019-09-01 PROCEDURE — 93005 ELECTROCARDIOGRAM TRACING: CPT | Performed by: EMERGENCY MEDICINE

## 2019-09-01 PROCEDURE — 25000128 H RX IP 250 OP 636: Performed by: STUDENT IN AN ORGANIZED HEALTH CARE EDUCATION/TRAINING PROGRAM

## 2019-09-01 PROCEDURE — 70498 CT ANGIOGRAPHY NECK: CPT

## 2019-09-01 PROCEDURE — 85025 COMPLETE CBC W/AUTO DIFF WBC: CPT | Performed by: EMERGENCY MEDICINE

## 2019-09-01 PROCEDURE — 71045 X-RAY EXAM CHEST 1 VIEW: CPT

## 2019-09-01 PROCEDURE — 83605 ASSAY OF LACTIC ACID: CPT

## 2019-09-01 RX ORDER — ACETAMINOPHEN 650 MG/1
650 SUPPOSITORY RECTAL EVERY 6 HOURS PRN
Status: DISCONTINUED | OUTPATIENT
Start: 2019-09-01 | End: 2019-09-05 | Stop reason: HOSPADM

## 2019-09-01 RX ORDER — GLYCOPYRROLATE 0.2 MG/ML
.2-.4 INJECTION, SOLUTION INTRAMUSCULAR; INTRAVENOUS EVERY 4 HOURS PRN
Status: DISCONTINUED | OUTPATIENT
Start: 2019-09-01 | End: 2019-09-05 | Stop reason: HOSPADM

## 2019-09-01 RX ORDER — LORAZEPAM 0.5 MG/1
.5-1 TABLET ORAL
Status: DISCONTINUED | OUTPATIENT
Start: 2019-09-01 | End: 2019-09-05 | Stop reason: HOSPADM

## 2019-09-01 RX ORDER — FLUTICASONE PROPIONATE 50 MCG
1 SPRAY, SUSPENSION (ML) NASAL DAILY
Status: DISCONTINUED | OUTPATIENT
Start: 2019-09-01 | End: 2019-09-01

## 2019-09-01 RX ORDER — ATROPINE SULFATE 10 MG/ML
1-2 SOLUTION/ DROPS OPHTHALMIC
Status: DISCONTINUED | OUTPATIENT
Start: 2019-09-01 | End: 2019-09-05 | Stop reason: HOSPADM

## 2019-09-01 RX ORDER — IOPAMIDOL 755 MG/ML
115 INJECTION, SOLUTION INTRAVASCULAR ONCE
Status: COMPLETED | OUTPATIENT
Start: 2019-09-01 | End: 2019-09-01

## 2019-09-01 RX ORDER — ONDANSETRON 2 MG/ML
4 INJECTION INTRAMUSCULAR; INTRAVENOUS EVERY 6 HOURS PRN
Status: DISCONTINUED | OUTPATIENT
Start: 2019-09-01 | End: 2019-09-05 | Stop reason: HOSPADM

## 2019-09-01 RX ORDER — HALOPERIDOL 5 MG/ML
.5-1 INJECTION INTRAMUSCULAR
Status: DISCONTINUED | OUTPATIENT
Start: 2019-09-01 | End: 2019-09-05 | Stop reason: HOSPADM

## 2019-09-01 RX ORDER — MORPHINE SULFATE 2 MG/ML
1-2 INJECTION, SOLUTION INTRAMUSCULAR; INTRAVENOUS
Status: DISCONTINUED | OUTPATIENT
Start: 2019-09-01 | End: 2019-09-05 | Stop reason: HOSPADM

## 2019-09-01 RX ORDER — LORAZEPAM 2 MG/ML
.5-1 INJECTION INTRAMUSCULAR
Status: DISCONTINUED | OUTPATIENT
Start: 2019-09-01 | End: 2019-09-05 | Stop reason: HOSPADM

## 2019-09-01 RX ORDER — ONDANSETRON 4 MG/1
4 TABLET, ORALLY DISINTEGRATING ORAL EVERY 6 HOURS PRN
Status: DISCONTINUED | OUTPATIENT
Start: 2019-09-01 | End: 2019-09-05 | Stop reason: HOSPADM

## 2019-09-01 RX ORDER — ALBUTEROL SULFATE 90 UG/1
2 AEROSOL, METERED RESPIRATORY (INHALATION) EVERY 4 HOURS PRN
Status: DISCONTINUED | OUTPATIENT
Start: 2019-09-01 | End: 2019-09-05 | Stop reason: HOSPADM

## 2019-09-01 RX ORDER — ACETAMINOPHEN 325 MG/1
650 TABLET ORAL EVERY 6 HOURS PRN
Status: DISCONTINUED | OUTPATIENT
Start: 2019-09-01 | End: 2019-09-05 | Stop reason: HOSPADM

## 2019-09-01 RX ORDER — SODIUM CHLORIDE 9 MG/ML
INJECTION, SOLUTION INTRAVENOUS CONTINUOUS
Status: DISCONTINUED | OUTPATIENT
Start: 2019-09-01 | End: 2019-09-01

## 2019-09-01 RX ORDER — BISACODYL 10 MG
10 SUPPOSITORY, RECTAL RECTAL
Status: DISCONTINUED | OUTPATIENT
Start: 2019-09-04 | End: 2019-09-05 | Stop reason: HOSPADM

## 2019-09-01 RX ORDER — AMOXICILLIN 250 MG
1 CAPSULE ORAL 2 TIMES DAILY
Status: DISCONTINUED | OUTPATIENT
Start: 2019-09-01 | End: 2019-09-01

## 2019-09-01 RX ADMIN — IOPAMIDOL 115 ML: 755 INJECTION, SOLUTION INTRAVENOUS at 11:51

## 2019-09-01 RX ADMIN — SODIUM CHLORIDE 500 ML: 0.9 INJECTION, SOLUTION INTRAVENOUS at 12:15

## 2019-09-01 ASSESSMENT — ACTIVITIES OF DAILY LIVING (ADL)
ADLS_ACUITY_SCORE: 28
WHICH_OF_THE_ABOVE_FUNCTIONAL_RISKS_HAD_A_RECENT_ONSET_OR_CHANGE?: AMBULATION;TRANSFERRING;TOILETING;BATHING;DRESSING;EATING;SWALLOWING;COGNITION;COMMUNICATION/SPEECH;FALL HISTORY
ADLS_ACUITY_SCORE: 28
DRESS: 2-->ASSISTIVE PERSON
FALL_HISTORY_WITHIN_LAST_SIX_MONTHS: YES
TOILETING: 2-->ASSISTIVE PERSON
NUMBER_OF_TIMES_PATIENT_HAS_FALLEN_WITHIN_LAST_SIX_MONTHS: 1
COGNITION: 1 - ATTENTION OR MEMORY DEFICITS
BATHING: 2-->ASSISTIVE PERSON
RETIRED_EATING: 2-->ASSISTIVE PERSON
SWALLOWING: 0-->SWALLOWS FOODS/LIQUIDS WITHOUT DIFFICULTY
TRANSFERRING: 0-->INDEPENDENT
RETIRED_COMMUNICATION: 0-->UNDERSTANDS/COMMUNICATES WITHOUT DIFFICULTY
AMBULATION: 0-->INDEPENDENT

## 2019-09-01 NOTE — ED PROVIDER NOTES
[facility] EMERGENCY DEPARTMENT ([facility])  9/01/19   History     Chief Complaint   Patient presents with     Cerebrovascular Accident     The history is provided by the EMS personnel and medical records. The history is limited by the condition of the patient (AMS).     Brie Gonzalez is a 89 year old female who has a PMHx of atrial fibrillation, MDD, anxiety, TIA and cerebral infarct without residual deficits, who presents to the Emergency Department via EMS from nursing home for evaluation of right-sided facial droop and left-sided weakness.  Due to the acuity of patient's condition HPI is primarily provided by emergency service personnel.  They report that they found the patient slumped onto her right side with clear left-sided weakness and right-sided facial droop.  EMS reports that the patient did endorse pain to them but was unable to localize.  Her last known normal was dinnertime yesterday which was around 6 PM.  Per EMS, on route the patient had a BP of 158/106, heart rate between 49-70 and 96% O2 sat on room air.    I have reviewed the Medications, Allergies, Past Medical and Surgical History, and Social History in the [employer] system.    Past Medical History:   Diagnosis Date     Anxiety 2.5 years ago     Arthritis Hands for years     Atrial fibrillation (H)      Cerebral infarction (H) 4-5 years ago     Depressive disorder 2.5 years ago intermittent     Hypertension 4-5 years ago       Past Surgical History:   Procedure Laterality Date     ADENOIDECTOMY  1930's     GYN SURGERY   1970's uterine fibroids     TONSILLECTOMY  1930's       Family History   Problem Relation Age of Onset     Dementia Mother      Cerebrovascular Disease Mother        Social History     Tobacco Use     Smoking status: Never Smoker     Smokeless tobacco: Never Used   Substance Use Topics     Alcohol use: Yes     Comment: Rare       No current facility-administered medications for this encounter.      Current Outpatient  Medications   Medication     ACETAMINOPHEN PO     ACETAMINOPHEN PO     albuterol (PROAIR HFA/PROVENTIL HFA/VENTOLIN HFA) 108 (90 Base) MCG/ACT Inhaler     fluticasone (FLONASE) 50 MCG/ACT spray     furosemide (LASIX) 20 MG tablet     METOPROLOL SUCCINATE ER PO     PAROXETINE HCL PO     Rivaroxaban (XARELTO PO)     sodium chloride (OCEAN) 0.65 % nasal spray     sodium chloride (OCEAN) 0.65 % nasal spray     VITAMIN D, CHOLECALCIFEROL, PO        Allergies   Allergen Reactions     Penicillins Swelling        Review of Systems   Unable to perform ROS: Mental status change       Physical Exam       ED Triage Vitals   Enc Vitals Group      BP 09/01/19 1116 (!) 164/80      Pulse 09/01/19 1114 (!) 42      Resp --       Temp 09/01/19 1114 97.8  F (36.6  C)      Temp src 09/01/19 1114 Axillary      SpO2 09/01/19 1114 100 %         Physical Exam   Constitutional: No distress.   HENT:   Head: Atraumatic.   Mouth/Throat: Oropharynx is clear and moist. No oropharyngeal exudate.   Eyes: Conjunctivae are normal. No scleral icterus.   Rightward  gaze   Cardiovascular: Normal heart sounds and intact distal pulses.   Bradycardic, irregular   Pulmonary/Chest: Breath sounds normal. No respiratory distress.   Abdominal: Soft. Bowel sounds are normal. There is no tenderness.   Musculoskeletal: She exhibits no edema or tenderness.   Neurological: A cranial nerve deficit is present. She exhibits abnormal muscle tone.   Somnolent, arousable   R facial droop   L neglect   Dense hemiplegia LUE, LLE    Skin: Skin is warm. No rash noted. She is not diaphoretic.   Nursing note and vitals reviewed.      ED Course   11:13 AM  The patient was seen and examined by Dre Adams MD in Room ED01.       Procedures             EKG Interpretation:      Interpreted by Emi Adams MD  Time reviewed: per Epic  Symptoms at time of EKG: None   Rhythm: atrial fibrillation - controlled  Rate: 40-50  Axis: Normal  Ectopy: none  Conduction: normal  ST Segments/  "T Waves: Non-specific ST-T wave changes  Q Waves: none  Comparison to prior: Unchanged    Clinical Impression: atrial fibrillation (chronic)                .             Labs Ordered and Resulted from Time of ED Arrival Up to the Time of Departure from the ED - No data to display         Assessments & Plan (with Medical Decision Making)     89 year old female who has a PMHx of atrial fibrillation off AC x 5 mos, MDD, anxiety, TIA and cerebral infarct without residual deficits, DNR/DNI with POLST on file, who presents to the Emergency Department via EMS from nursing home for evaluation of right-sided facial droop and left-sided weakness. Patient's presentation concerning for CVA. Code status confirmed with daughter at bedside and POLST documentation reviewed in Epic, with attention to \"in general avoid ICU.\" Daughter reports patient found in her pajamas at NH suggesting likelihood of being in that position overnight given major family social function today for which patient would have predictably awoken early to prepare and dress for. Stroke activation immediately upon arrival considered, however deferred for these above reasons.     IV established per EMS, patient placed on cardiac monitor which revealed A fib bradycardia and otherwise normal VS. EKG without acute ischemic changes. Neuro stroke consulted and evaluated patient at bedside. Labs sent and documented in Epic. Patient sent to CT stroke protocol + perfusion study which revealed large R MCA infarct. Patient placed on comfort measures per discussion with Stroke team and admitted to Neurology private room. Family updated throughout course of ED evaluation, reiterating patient's advanced directive and in agreement with plan.      I have reviewed the nursing notes.    I have reviewed the findings, diagnosis, plan and need for follow up with the patient.    New Prescriptions    No medications on file       Final diagnoses:   Cerebrovascular accident (CVA) due to " embolism of right middle cerebral artery (H)     IMatthew, am serving as a trained medical scribe to document services personally performed by Emi Adams MD, based on the provider's statements to me.   IEmi MD, was physically present and have reviewed and verified the accuracy of this note documented by Matthew Cerda.     9/1/2019   [facility], [place], EMERGENCY DEPARTMENT     Emi Adams MD  09/03/19 1321       Emi Adams MD  09/03/19 [id number]

## 2019-09-01 NOTE — LETTER
"Transition Communication Hand-off for Care Transitions to Next Level of Care Provider    Name: Brie Gonzalez  : 1925  MRN #: [id number]  Primary Care Provider: Mary Ellen Arevalo     Primary Clinic: [facility] PHYS[facility] [address]   [facility] [address]     Reason for Hospitalization:  Cerebrovascular accident (CVA) due to embolism of right middle cerebral artery (H) [I63.411]  Admit Date/Time: 2019 11:10 AM  Discharge Date:  19  Payor Source: Payor: [employer] / Plan: HU[employer] MEDICARE ADVANTAGE / Product Type: Medicare /              Reason for Communication Hand-off Referral:     Discharge Plan:    Social Work Services Discharge Note     Patient Name:  Brie Gonzalez     Anticipated Discharge Date:  19     Discharge Disposition:   Return to [facility] ([phone]) with contracted hospice services to be provided by [facility] ([phone])     Following MD:  PCP and Medical Director of [facility].           Additional Services/Equipment Arranged:  Dr.Tanner Lew, Neurology, has confirmed readiness for discharge.  RENY spoke with Fausto Altman RN at [facility] to confirm pt's return to assistive living today with home hospice services. RENY spoke with Kinjal Garcia, Director of [facility].  Per Kinjal, her agency will fill pt's scripts. [facility] should not fill the discharge medications.  Per Kinjal, [facility] will initiate services at pt's home today between 1 and 1:30pm.  Per Kinjal, [facility] Medical Director will determine and order meds for comfort and symptom management as needed and thus, Kinjal states that [facility] does not need to add any add'l medications to the discharge orders.  RENY has arranged for [employer] (Max [phone]) to provide stretcher transport to home at 12:30pm today.  RENY has completed a \"Physicians " "Certification for Transportation\" form.       Patient / Family response to discharge plan:  Pt's family daughters (Tracey and Mariel Dalton) and son in law (Jimmy) voice understanding of the discharge plan and agreement with the discharge plan.     Persons notified of above discharge plan:  Daughters and son in law, Dr. Lew and 6A nursing.       Staff Discharge Instructions:  Please fax discharge orders and signed hard scripts for any controlled substances (SW will fax discharge orders to [facility] and to [facility]..  Please print a packet and send with patient.      CTS Handoff completed:  YES     Medicare Notice of Rights provided to the patient/family:  NO, as [employer] manages pt's medicare benefits.     MILDRED Champion  Social Work, 6A  Phone:  [phone]  Pager:  [phone]  9/5/2019     "

## 2019-09-01 NOTE — CONSULTS
[facility] [place]      Neurology Stroke Consult    Patient Name: Brie Gonzalez  : 1925 MRN#: [id number]    STROKE DATA    Stroke Code:  Stroke code not activated.  Time patient seen:  2019 1125  Last known normal (pt's baseline):  19 1800    TPA treatment:  Not given due to outside the time window.     National Institutes of Health Stroke Scale (at presentation)  NIHSS done at:  time patient seen      Score    Level of consciousness:  (2)   Not alert; repeat stim to attend strong stim/pain to move     LOC questions:  (2)   Answers neither question correctly    LOC commands:  (2)   Performs neither task correctly    Best gaze:  (2)   Forced deviation    Visual:  (2)   Complete hemianopia    Facial palsy:  (1)   Minor paralysis (flat nasolabial fold, smile asymmetry)    Motor arm (left):  (4)   No movement    Motor arm (right):  (2)   Some effort against gravity    Motor leg (left):  (2)   Some effort against gravity    Motor leg (right):  (2)   Some effort against gravity    Limb ataxia:  UN    Sensory:  Withdraws to pain on right only    Best language:  (3)   Mute- global aphasia    Dysarthria:  UN Intubated or other physical barrier: gasping for breath    Extinction and inattention:  (1)   Visual, tacile, auditory, spatial, person inattention        NIHSS Total Score:  24        Dysphagia Screen  Failed     ASSESSMENT    89 year old female who has a PMHx of atrial fibrillation, MDD, anxiety, TIA and cerebral infarct without residual deficits, who presents to the Emergency Department via EMS from nursing home for evaluation of right-sided facial droop and left-sided weakness.   The patient was seen for concern for right MCA in patient who has been off AC for 5 months due to epistaxis and intolerance to different agents (elective with known risks of stroke).  Likely mechanism is related to discontinuation of AC.  CT reveals near complete right stroke, ASPECT <6 appears  off source image distal ica vs proximal mca on CTA head/neck.  Comfort Cares order placed, patient to be admitted to stroke service, minimize neuro checks, vitals, and lab draws.  Expect swelling to increase and patient may pass in next several days.  This plan was discussed with patient and daughter.  DNR/DNI is per patient's wishes as there is not meaningful expected recovery.  She was MRS 0 or 1 prior to this event.       Impression:  #Right MCA stroke, completed  #Likely aspiration event related to #1        Recommendations:  -CTA stat head and neck  -CT Perfusion scan    Acute Ischemic Stroke (without tPA) Plan  - Admit to Neurology  - Neurochecks once upon reaching unit and comfort care (minimal checks) thereafter  - Permissive HTN; labetalol PRN for SBP > 220  - Avoid hypotonic IV fluids  - Daily aspirin will not be initiated mg for secondary stroke prevention  - Statin: NA, on comfort care  - Stroke Education  - Depression Screen  - Apnea Screen  - Euthermia, Euglycemia    Prophylaxis          For VTE Prevention:  - pneumatic compression device     The patient will be admitted to the Neuro Critical Care/Stroke team..    HPI  89 year old female who has a PMHx of atrial fibrillation, MDD, anxiety, TIA and cerebral infarct without residual deficits, who presents to the Emergency Department via EMS from nursing home for evaluation of right-sided facial droop and left-sided weakness.  Due to the acuity of patient's condition HPI is primarily provided by emergency service personnel.  They report that they found the patient slumped onto her right side with clear left-sided weakness and right-sided facial droop.  EMS reports that the patient did endorse pain to them but was unable to localize.  Her last known normal was dinnertime yesterday which was around 6 PM.  Per EMS, on route the patient had a BP of 158/106, heart rate between 49-70 and 96% O2 sat on room air.    Pertinent Past Medical/Surgical History  Past  Medical History:   Diagnosis Date     Anxiety 2.5 years ago     Arthritis Hands for years     Atrial fibrillation (H)      Cerebral infarction (H) 4-5 years ago     Depressive disorder 2.5 years ago intermittent     Hypertension 4-5 years ago       Past Surgical History:   Procedure Laterality Date     ADENOIDECTOMY  1930's     GYN SURGERY   1970's uterine fibroids     TONSILLECTOMY  1930's       Medications:   Current Facility-Administered Medications   Medication     0.9% sodium chloride BOLUS     iopamidol (ISOVUE-370) solution 115 mL     sodium chloride (PF) 0.9% PF flush 100 mL     Current Outpatient Medications   Medication Sig     ACETAMINOPHEN PO Take 325 mg by mouth every 4 hours as needed for pain     ACETAMINOPHEN PO Take 650 mg by mouth daily      albuterol (PROAIR HFA/PROVENTIL HFA/VENTOLIN HFA) 108 (90 Base) MCG/ACT Inhaler Inhale 2 puffs into the lungs every 4 hours as needed for shortness of breath / dyspnea or wheezing     fluticasone (FLONASE) 50 MCG/ACT spray Spray 1 spray into both nostrils daily     furosemide (LASIX) 20 MG tablet Take 1 tablet (20 mg) by mouth daily as needed     METOPROLOL SUCCINATE ER PO Take 12.5 mg by mouth daily     PAROXETINE HCL PO Take 10 mg by mouth daily     Rivaroxaban (XARELTO PO) Take 10 mg by mouth daily     sodium chloride (OCEAN) 0.65 % nasal spray Spray 2 sprays into both nostrils 2 times daily as needed for other (nasal dryness)     sodium chloride (OCEAN) 0.65 % nasal spray Spray 1 spray into both nostrils every 2 hours (Patient taking differently: Spray 1 spray into both nostrils 3 times daily )     VITAMIN D, CHOLECALCIFEROL, PO Take 1,000 Units by mouth daily   .    Allergies:   Allergies   Allergen Reactions     Penicillins Swelling   .    Family History:   Family History   Problem Relation Age of Onset     Dementia Mother      Cerebrovascular Disease Mother    .    Social History:   Social History     Tobacco Use     Smoking status: Never Smoker      Smokeless tobacco: Never Used   Substance Use Topics     Alcohol use: Yes     Comment: Rare   .    Tobacco use: Never    ROS:  Unable to obtain from patient    PHYSICAL EXAMINATION  Vital Signs:  B/P: 164/80,  T: 97.8,  P: 42,  R: Data Unavailable    General: Adult, lying in bed with eyes closed, gasping for air  HEENT: NC/AT, no icterus, op pink and moist  Neuro:  Mental status: somnolent, effort required to arouse. Oriented to self. Follows some commands but not consistently. Speech fluency is impaired, comprehension and repetition are all apparently intact. High degree of dysarthria.  Cranial nerves: Eyes deviated to the right, do not cross midline, PERRLA, visual fields partial (right only), face asymmetric, hearing intact to conversation.  Motor: Tone normal with degree of paratonia. Minimal to no movement LUE,, withdraws left lower extremity able to lift right arm and leg.  No abnormal movements noted (no myoclonus). Asterixis is not present.  Reflexes: hypereflexic and symmetric biceps, brachioradialis, patellar, and achilles. Toes down-going.  Sensory: Intact to pain x 3 extremities (not left upper)  Coordination/gait: unable to perform - mental status  Labs  Labs and Imaging reviewed and used in developing the plan; pertinent results included.     Lab Results   Component Value Date    GLC 82 05/17/2018     CBC RESULTS:   Recent Labs   Lab Test 09/01/19  1118   WBC 10.5   RBC 4.37   HGB 14.0   HCT 44.8   *   MCH 32.0   MCHC 31.3*   RDW 14.7        Last Comprehensive Metabolic Panel:  Sodium   Date Value Ref Range Status   09/01/2019 138 133 - 144 mmol/L Final     Potassium   Date Value Ref Range Status   09/01/2019 4.6 3.4 - 5.3 mmol/L Final     Chloride   Date Value Ref Range Status   09/01/2019 108 94 - 109 mmol/L Final     Carbon Dioxide   Date Value Ref Range Status   09/01/2019 23 20 - 32 mmol/L Final     Anion Gap   Date Value Ref Range Status   09/01/2019 7 3 - 14 mmol/L Final      Glucose   Date Value Ref Range Status   09/01/2019 96 70 - 99 mg/dL Final     Urea Nitrogen   Date Value Ref Range Status   09/01/2019 14 7 - 30 mg/dL Final     Creatinine   Date Value Ref Range Status   09/01/2019 0.81 0.52 - 1.04 mg/dL Final     GFR Estimate   Date Value Ref Range Status   09/01/2019 67 >60 mL/min/[1.73_m2] Final     Calcium   Date Value Ref Range Status   09/01/2019 9.1 8.5 - 10.1 mg/dL Final     Xr Chest Port 1 View    Result Date: 9/1/2019  Exam: XR CHEST PORT 1 VW, 9/1/2019 11:37 AM Indication: AMS Comparison: CT 5/14/2018, chest x-ray 5/13/2018 Findings: Mildly prominent heart. Pulmonary vasculature is indistinct. Bilateral calcified pleural plaques. No pneumothorax. Small bilateral pleural effusions with overlying bibasilar opacities.     Impression: Stable small bilateral pleural effusions with bibasilar opacities, atelectasis versus consolidation. RAYMOND ANTON MD    Ct Head W/o Contrast    Result Date: 9/1/2019  Preliminary report: This is a preliminary resident interpretation. Full report to follow.      Impression: 1. Acute ischemic infarction in right MCA territory involving the right frontal, temporal, and parietal lobes. 2. Additional prominent periventricular and subcortical white matter hypoattenuation on the left, nonspecific, suggesting sequela of chronic small vessel ischemic disease.      The patient was discussed with the Fellow, Dr. Marie.  The staff is Dr. Kirkland.    Kwadwo Mishra MD   Pager: [phone]

## 2019-09-01 NOTE — ED NOTES
[facility], [place]   ED Nurse to Floor Handoff     Brie Gonzalez is a 89 year old female who speaks English and lives alone,  in a nursing home  They arrived in the ED by ambulance from home    ED Chief Complaint: Cerebrovascular Accident    ED Dx;   Final diagnoses:   Cerebrovascular accident (CVA) due to embolism of right middle cerebral artery (H)         Needed?: No    Allergies:   Allergies   Allergen Reactions     Penicillins Swelling   .  Past Medical Hx:   Past Medical History:   Diagnosis Date     Anxiety 2.5 years ago     Arthritis Hands for years     Atrial fibrillation (H)      Cerebral infarction (H) 4-5 years ago     Depressive disorder 2.5 years ago intermittent     Hypertension 4-5 years ago      Baseline Mental status: WDL  Current Mental Status changes: other somnolent    Infection present or suspected this encounter: no  Sepsis suspected: No  Isolation type: No active isolations     Activity level - Baseline/Home:  Independent  Activity Level - Current:   Total Care    Bariatric equipment needed?: No    In the ED these meds were given:   Medications   0.9% sodium chloride BOLUS (500 mLs Intravenous New Bag 9/1/19 1215)   iopamidol (ISOVUE-370) solution 115 mL (115 mLs Intravenous Given 9/1/19 1151)   sodium chloride (PF) 0.9% PF flush 100 mL (100 mLs Intravenous Given 9/1/19 1151)       Drips running?  Yes - 500 mL bolus    Home pump  No    Current LDAs  Peripheral IV 09/01/19 Left Upper forearm (Active)   Site Assessment WDL 9/1/2019 11:12 AM   Line Status Infusing 9/1/2019 11:12 AM   Phlebitis Scale 0-->no symptoms 9/1/2019 11:12 AM   Number of days: 0       Labs results:   Labs Ordered and Resulted from Time of ED Arrival Up to the Time of Departure from the ED   CBC WITH PLATELETS DIFFERENTIAL - Abnormal; Notable for the following components:       Result Value     (*)     MCHC 31.3 (*)     Absolute Neutrophil 8.7 (*)     All other components  within normal limits   GLUCOSE BY METER - Abnormal; Notable for the following components:    Glucose 101 (*)     All other components within normal limits   ISTAT INR POCT - Abnormal; Notable for the following components:    ISTAT INR 1.3 (*)     All other components within normal limits   ISTAT  GASES LACTATE DARRELL POCT - Abnormal; Notable for the following components:    PO2 Venous 20 (*)     All other components within normal limits   GLUCOSE MONITOR NURSING POCT   BASIC METABOLIC PANEL   PARTIAL THROMBOPLASTIN TIME   CREATININE POCT   CK TOTAL   ACTIVITY   NOTIFY   ISTAT INR NURSING POCT   VITAL SIGNS AND NEURO CHECKS   PULSE OXIMETRY NURSING   ASSESSMENT   TROPONIN POCT       Imaging Studies:   Recent Results (from the past 24 hour(s))   XR Chest Port 1 View    Narrative    Exam: XR CHEST PORT 1 VW, 9/1/2019 11:37 AM    Indication: AMS    Comparison: CT 5/14/2018, chest x-ray 5/13/2018    Findings:   Mildly prominent heart. Pulmonary vasculature is indistinct. Bilateral  calcified pleural plaques. No pneumothorax. Small bilateral pleural  effusions with overlying bibasilar opacities.      Impression    Impression: Stable small bilateral pleural effusions with bibasilar  opacities, atelectasis versus consolidation.    RAYMOND ANTON MD   CT Head w/o Contrast    Narrative    Preliminary report:  This is a preliminary resident interpretation. Full report to follow.         Impression    Impression:   1. Acute ischemic infarction in right MCA territory involving the  right frontal, temporal, and parietal lobes.  2. Additional prominent periventricular and subcortical white matter  hypoattenuation on the left, nonspecific, suggesting sequela of  chronic small vessel ischemic disease.       Recent vital signs:   BP (!) 164/80   Pulse (!) 42   Temp 97.8  F (36.6  C) (Axillary)   SpO2 100%     [place] Coma Scale Score: 11 (09/01/19 1207)       Cardiac Rhythm: A fib  Pt needs tele? Yes  Skin/wound Issues: None    Code  Status: DNR / DNI    Pain control: pt had none    Nausea control: pt had none    Abnormal labs/tests/findings requiring intervention: Infarction of the R MCA. She is not a candidate for TPA. Right sided facial droop, left arm weakness, somnolent, responds to stimulation but doesn't follow commands.    Family present during ED course? Yes   Family Comments/Social Situation comments: Family members present discussing plans for comfort cares    Tasks needing completion: none    Abbie Carcamo RN    [phone] [facility]

## 2019-09-01 NOTE — PLAN OF CARE
Pt admitted around 1330 from ED for right MCA stroke. Left sided weakness. Pt on comfort cares. Turn/repo q2hrs. Last turned around 1745. Oral cares completed frequently. No s/s of pain. Pt somnolent, responded to questions once. Incontinent urine, no BM. NPO. No IV. BA on. Does not use call light appropriately. Family present and supportive this afternoon. Continue with POC.

## 2019-09-01 NOTE — H&P
[facility], [place]      Neurology Stroke Consult    Patient Name: Brie Gonzalez  : 1925 MRN#: [id number]    STROKE DATA    Stroke Code:  Stroke code not activated.  Time patient seen:  2019 1125  Last known normal (pt's baseline):  19 1800    TPA treatment:  Not given due to outside the time window.     National Institutes of Health Stroke Scale (at presentation)  NIHSS done at:  time patient seen      Score    Level of consciousness:  (2)   Not alert; repeat stim to attend strong stim/pain to move     LOC questions:  (2)   Answers neither question correctly    LOC commands:  (2)   Performs neither task correctly    Best gaze:  (2)   Forced deviation    Visual:  (2)   Complete hemianopia    Facial palsy:  (1)   Minor paralysis (flat nasolabial fold, smile asymmetry)    Motor arm (left):  (4)   No movement    Motor arm (right):  (2)   Some effort against gravity    Motor leg (left):  (2)   Some effort against gravity    Motor leg (right):  (2)   Some effort against gravity    Limb ataxia:  UN    Sensory:  Withdraws to pain on right only    Best language:  (3)   Mute- global aphasia    Dysarthria:  UN Intubated or other physical barrier: gasping for breath    Extinction and inattention:  (1)   Visual, tacile, auditory, spatial, person inattention        NIHSS Total Score:  24        Dysphagia Screen  Failed     ASSESSMENT    89 year old female who has a PMHx of atrial fibrillation, MDD, anxiety, TIA and cerebral infarct without residual deficits, who presents to the Emergency Department via EMS from nursing home for evaluation of right-sided facial droop and left-sided weakness.   The patient was seen for concern for right MCA in patient who has been off AC for 5 months due to epistaxis and intolerance to different agents (elective with known risks of stroke).  Likely mechanism is related to discontinuation of AC.  CT reveals near complete right stroke, ASPECT <6 appears  off source image distal ica vs proximal mca on CTA head/neck.  Comfort Cares order placed, patient to be admitted to stroke service, minimize neuro checks, vitals, and lab draws.  Expect swelling to increase and patient may pass in next several days.  This plan was discussed with patient and daughter.  DNR/DNI is per patient's wishes as there is not meaningful expected recovery.  She was MRS 0 or 1 prior to this event.       Impression:  #Right MCA stroke, completed  #Likely aspiration event related to #1        Recommendations:  -CTA stat head and neck  -CT Perfusion scan    Acute Ischemic Stroke (without tPA) Plan  - Admit to Neurology  - Neurochecks once upon reaching unit and comfort care (minimal checks) thereafter  - Permissive HTN; labetalol PRN for SBP > 220  - Avoid hypotonic IV fluids  - Daily aspirin will not be initiated mg for secondary stroke prevention  - Statin: NA, on comfort care  - Stroke Education  - Depression Screen  - Apnea Screen  - Euthermia, Euglycemia    Prophylaxis          For VTE Prevention:  - pneumatic compression device     The patient will be admitted to the Neuro Critical Care/Stroke team..    HPI  89 year old female who has a PMHx of atrial fibrillation, MDD, anxiety, TIA and cerebral infarct without residual deficits, who presents to the Emergency Department via EMS from nursing home for evaluation of right-sided facial droop and left-sided weakness.  Due to the acuity of patient's condition HPI is primarily provided by emergency service personnel.  They report that they found the patient slumped onto her right side with clear left-sided weakness and right-sided facial droop.  EMS reports that the patient did endorse pain to them but was unable to localize.  Her last known normal was dinnertime yesterday which was around 6 PM.  Per EMS, on route the patient had a BP of 158/106, heart rate between 49-70 and 96% O2 sat on room air.    Pertinent Past Medical/Surgical History  Past  Medical History:   Diagnosis Date     Anxiety 2.5 years ago     Arthritis Hands for years     Atrial fibrillation (H)      Cerebral infarction (H) 4-5 years ago     Depressive disorder 2.5 years ago intermittent     Hypertension 4-5 years ago       Past Surgical History:   Procedure Laterality Date     ADENOIDECTOMY  1930's     GYN SURGERY   1970's uterine fibroids     TONSILLECTOMY  1930's       Medications:   Current Facility-Administered Medications   Medication     0.9% sodium chloride BOLUS     iopamidol (ISOVUE-370) solution 115 mL     sodium chloride (PF) 0.9% PF flush 100 mL     Current Outpatient Medications   Medication Sig     ACETAMINOPHEN PO Take 325 mg by mouth every 4 hours as needed for pain     ACETAMINOPHEN PO Take 650 mg by mouth daily      albuterol (PROAIR HFA/PROVENTIL HFA/VENTOLIN HFA) 108 (90 Base) MCG/ACT Inhaler Inhale 2 puffs into the lungs every 4 hours as needed for shortness of breath / dyspnea or wheezing     fluticasone (FLONASE) 50 MCG/ACT spray Spray 1 spray into both nostrils daily     furosemide (LASIX) 20 MG tablet Take 1 tablet (20 mg) by mouth daily as needed     METOPROLOL SUCCINATE ER PO Take 12.5 mg by mouth daily     PAROXETINE HCL PO Take 10 mg by mouth daily     Rivaroxaban (XARELTO PO) Take 10 mg by mouth daily     sodium chloride (OCEAN) 0.65 % nasal spray Spray 2 sprays into both nostrils 2 times daily as needed for other (nasal dryness)     sodium chloride (OCEAN) 0.65 % nasal spray Spray 1 spray into both nostrils every 2 hours (Patient taking differently: Spray 1 spray into both nostrils 3 times daily )     VITAMIN D, CHOLECALCIFEROL, PO Take 1,000 Units by mouth daily   .    Allergies:   Allergies   Allergen Reactions     Penicillins Swelling   .    Family History:   Family History   Problem Relation Age of Onset     Dementia Mother      Cerebrovascular Disease Mother    .    Social History:   Social History     Tobacco Use     Smoking status: Never Smoker      Smokeless tobacco: Never Used   Substance Use Topics     Alcohol use: Yes     Comment: Rare   .    Tobacco use: Never    ROS:  Unable to obtain from patient    PHYSICAL EXAMINATION  Vital Signs:  B/P: 164/80,  T: 97.8,  P: 42,  R: Data Unavailable    General: Adult, lying in bed with eyes closed, gasping for air  HEENT: NC/AT, no icterus, op pink and moist  Neuro:  Mental status: somnolent, effort required to arouse. Oriented to self. Follows some commands but not consistently. Speech fluency is impaired, comprehension and repetition are all apparently intact. High degree of dysarthria.  Cranial nerves: Eyes deviated to the right, do not cross midline, PERRLA, visual fields partial (right only), face asymmetric, hearing intact to conversation.  Motor: Tone normal with degree of paratonia. Minimal to no movement LUE,, withdraws left lower extremity able to lift right arm and leg.  No abnormal movements noted (no myoclonus). Asterixis is not present.  Reflexes: hypereflexic and symmetric biceps, brachioradialis, patellar, and achilles. Toes down-going.  Sensory: Intact to pain x 3 extremities (not left upper)  Coordination/gait: unable to perform - mental status  Labs  Labs and Imaging reviewed and used in developing the plan; pertinent results included.     Lab Results   Component Value Date    GLC 82 05/17/2018     CBC RESULTS:   Recent Labs   Lab Test 09/01/19  1118   WBC 10.5   RBC 4.37   HGB 14.0   HCT 44.8   *   MCH 32.0   MCHC 31.3*   RDW 14.7        Last Comprehensive Metabolic Panel:  Sodium   Date Value Ref Range Status   09/01/2019 138 133 - 144 mmol/L Final     Potassium   Date Value Ref Range Status   09/01/2019 4.6 3.4 - 5.3 mmol/L Final     Chloride   Date Value Ref Range Status   09/01/2019 108 94 - 109 mmol/L Final     Carbon Dioxide   Date Value Ref Range Status   09/01/2019 23 20 - 32 mmol/L Final     Anion Gap   Date Value Ref Range Status   09/01/2019 7 3 - 14 mmol/L Final      Glucose   Date Value Ref Range Status   09/01/2019 96 70 - 99 mg/dL Final     Urea Nitrogen   Date Value Ref Range Status   09/01/2019 14 7 - 30 mg/dL Final     Creatinine   Date Value Ref Range Status   09/01/2019 0.81 0.52 - 1.04 mg/dL Final     GFR Estimate   Date Value Ref Range Status   09/01/2019 67 >60 mL/min/[1.73_m2] Final     Calcium   Date Value Ref Range Status   09/01/2019 9.1 8.5 - 10.1 mg/dL Final     Xr Chest Port 1 View    Result Date: 9/1/2019  Exam: XR CHEST PORT 1 VW, 9/1/2019 11:37 AM Indication: AMS Comparison: CT 5/14/2018, chest x-ray 5/13/2018 Findings: Mildly prominent heart. Pulmonary vasculature is indistinct. Bilateral calcified pleural plaques. No pneumothorax. Small bilateral pleural effusions with overlying bibasilar opacities.     Impression: Stable small bilateral pleural effusions with bibasilar opacities, atelectasis versus consolidation. RAYMOND ANTON MD    Ct Head W/o Contrast    Result Date: 9/1/2019  Preliminary report: This is a preliminary resident interpretation. Full report to follow.      Impression: 1. Acute ischemic infarction in right MCA territory involving the right frontal, temporal, and parietal lobes. 2. Additional prominent periventricular and subcortical white matter hypoattenuation on the left, nonspecific, suggesting sequela of chronic small vessel ischemic disease.      The patient was discussed with the Fellow, Dr. Marie.  The staff is Dr. Kirkland.    Kwadwo Mishra MD   Pager: [phone]

## 2019-09-01 NOTE — ED TRIAGE NOTES
nursing home patient, lkw between 4 and 6 pm, right sided facial droop, and left sided weakness     DNR

## 2019-09-02 PROCEDURE — 12000001 ZZH R&B MED SURG/OB UMMC

## 2019-09-02 PROCEDURE — 25000132 ZZH RX MED GY IP 250 OP 250 PS 637: Performed by: STUDENT IN AN ORGANIZED HEALTH CARE EDUCATION/TRAINING PROGRAM

## 2019-09-02 RX ORDER — NALOXONE HYDROCHLORIDE 0.4 MG/ML
.1-.4 INJECTION, SOLUTION INTRAMUSCULAR; INTRAVENOUS; SUBCUTANEOUS
Status: DISCONTINUED | OUTPATIENT
Start: 2019-09-02 | End: 2019-09-05 | Stop reason: HOSPADM

## 2019-09-02 RX ORDER — MORPHINE SULFATE 100 MG/5ML
2.5-1 SOLUTION ORAL
Status: DISCONTINUED | OUTPATIENT
Start: 2019-09-02 | End: 2019-09-05 | Stop reason: HOSPADM

## 2019-09-02 RX ADMIN — MORPHINE SULFATE 2.6 MG: 100 SOLUTION ORAL at 08:14

## 2019-09-02 RX ADMIN — MORPHINE SULFATE 2.6 MG: 100 SOLUTION ORAL at 00:47

## 2019-09-02 ASSESSMENT — ACTIVITIES OF DAILY LIVING (ADL)
ADLS_ACUITY_SCORE: 26
ADLS_ACUITY_SCORE: 28
ADLS_ACUITY_SCORE: 26

## 2019-09-02 NOTE — PLAN OF CARE
Pt here on comfort cares. Pt usually obtunded but will occasionally talk- stated she had lower back pain x1 PRN Morphine x1 effective. Appears comfortable/ no grimacing. Some apenic periods. Skin intact- repo q 2  hours. No IV access. Oral cares done q 2 hours. Family is planning on coming later this morning; daughter would like to be called if any changes (lives 3 miles away). Will continue to monitor comfort.

## 2019-09-02 NOTE — PLAN OF CARE
Pt admitted for right MCA stroke. Left sided weakness. Pt on comfort cares. Turn/repo q2hrs. Last turned around 1400. Oral cares completed frequently. Pt somnolent, responded to questions once, stated she had lower back pain. PRN morphine given with relief. Incontinent urine, no BM. NPO. No IV. BA on. Does not use call light appropriately. Family present and supportive this afternoon. Continue with POC.

## 2019-09-02 NOTE — PROGRESS NOTES
[facility], [place]    Vascular Neurology Progress Note      Interval History   Afebrile, hypertensive to 150s, saturating 90s on room air.     Labs unremarkable     Assessment & Plan   89 year old female who has a PMHx of atrial fibrillation, MDD, anxiety, TIA and cerebral infarct without residual deficits, who presents to the Emergency Department via EMS from nursing home for evaluation of right-sided facial droop and left-sided weakness.   The patient was seen for concern for right MCA in patient who has been off AC for 5 months due to epistaxis and intolerance to different agents (elective with known risks of stroke).  Likely mechanism is related to discontinuation of AC.  CT reveals near complete right stroke, ASPECT <6 appears off source image distal ica vs proximal mca on CTA head/neck.  Comfort Cares order placed, patient to be admitted to stroke service, minimize neuro checks, vitals, and lab draws.  Expect swelling to increase and patient may pass in next several days.  This plan was discussed with patient and daughter.  DNR/DNI is per patient's wishes as there is not meaningful expected recovery.  She was MRS 0 or 1 prior to this event.        Impression:     Comfort  -continuing discussion with family. 30 minute conversation about prognosis and goals of care 9/1.       #Right MCA stroke, completed  #Likely aspiration event related to #1     Acute Ischemic Stroke (without tPA) Plan  - Admit to Neurology  - Neurochecks once upon reaching unit and comfort care (minimal checks) thereafter  - Permissive HTN; labetalol PRN for SBP > 220  - Avoid hypotonic IV fluids  - Daily aspirin will not be initiated mg for secondary stroke prevention  - Statin: NA, on comfort care  - Stroke Education  - Depression Screen  - Apnea Screen  - Euthermia, Euglycemia    Prophylaxis          For VTE Prevention:  - pneumatic compression device    Discussed plan of care with fellow Dr. Marie, attending   Nellie Wilson MD  Neurology PGY-1      ____________________________________________________________________      Medications     Scheduled medications      Infusion medications    - MEDICATION INSTRUCTIONS -         PRN medications  Current Facility-Administered Medications   Medication Dose Route Frequency     acetaminophen  650 mg Rectal Q6H PRN     acetaminophen  650 mg Oral Q6H PRN     albuterol  2 puff Inhalation Q4H PRN     atropine  1-2 drop Sublingual Q1H PRN     [START ON 9/4/2019] bisacodyl  10 mg Rectal Once PRN     glycopyrrolate  0.2-0.4 mg Intravenous Q4H PRN     haloperidol lactate  0.5-1 mg Intravenous Q1H PRN     hypromellose-dextran  1-2 drop Both Eyes Q8H PRN     LORazepam  0.5-1 mg Intravenous Q3H PRN    Or     LORazepam  0.5-1 mg Oral Q3H PRN    Or     LORazepam  0.5-1 mg Sublingual Q3H PRN     - MEDICATION INSTRUCTIONS -   Does not apply Continuous PRN     morphine  1-2 mg Intravenous Q1H PRN     morphine HIGH CONCENTRATE  2.6-10 mg Oral Q1H PRN     naloxone  0.1-0.4 mg Intravenous Q2 Min PRN     OLANZapine zydis  2.5-5 mg Oral Q6H PRN     ondansetron  4 mg Oral Q6H PRN    Or     ondansetron  4 mg Intravenous Q6H PRN       Allergies   Allergies   Allergen Reactions     Penicillins Swelling       Physical Exam     Temp:  [97.8  F (36.6  C)] 97.8  F (36.6  C)  Pulse:  [42-54] 48  Heart Rate:  [44-59] 44  Resp:  [10-18] 15  BP: (142-164)/(64-80) 146/64  SpO2:  [96 %-100 %] 97 %    General: Adult, lying in bed with eyes closed, gasping for air  HEENT: NC/AT, no icterus, op pink and moist  Neuro:  Mental status: somnolent, effort required to arouse. Oriented to self. Follows some commands but not consistently. Speech fluency is impaired, comprehension and repetition are all apparently intact. High degree of dysarthria.  Cranial nerves: Eyes deviated to the right, do not cross midline, PERRLA, visual fields partial (right only), face asymmetric, hearing intact to  conversation.  Motor: Tone normal with degree of paratonia. Minimal to no movement LUE,, withdraws left lower extremity able to lift right arm and leg.  No abnormal movements noted (no myoclonus). Asterixis is not present.  Reflexes: hypereflexic and symmetric biceps, brachioradialis, patellar, and achilles. Toes down-going.  Sensory: Intact to pain x 3 extremities (not left upper)  Coordination/gait: unable to perform - mental status    Stroke Scales    STROKE DATA     Stroke Code:  Stroke code not activated.  Time patient seen:  09/01/2019 1125  Last known normal (pt's baseline):  8/31/19 1800     TPA treatment:  Not given due to outside the time window.      National Institutes of Health Stroke Scale (at presentation)  NIHSS done at:  time patient seen        Score    Level of consciousness:  (2)   Not alert; repeat stim to attend strong stim/pain to move     LOC questions:  (2)   Answers neither question correctly    LOC commands:  (2)   Performs neither task correctly    Best gaze:  (2)   Forced deviation    Visual:  (2)   Complete hemianopia    Facial palsy:  (1)   Minor paralysis (flat nasolabial fold, smile asymmetry)    Motor arm (left):  (4)   No movement    Motor arm (right):  (2)   Some effort against gravity    Motor leg (left):  (2)   Some effort against gravity    Motor leg (right):  (2)   Some effort against gravity    Limb ataxia:  UN    Sensory:  Withdraws to pain on right only    Best language:  (3)   Mute- global aphasia    Dysarthria:  UN Intubated or other physical barrier: gasping for breath    Extinction and inattention:  (1)   Visual, tacile, auditory, spatial, person inattention          NIHSS Total Score:  24         Data     Imaging  I personally reviewed all imaging     Labs:  CBC:  Recent Labs   Lab 09/01/19  1118   WBC 10.5   RBC 4.37   HGB 14.0   HCT 44.8          Basic Metabolic Panel:   Recent Labs   Lab Test 09/01/19  1118 05/17/18  0606    140   POTASSIUM 4.6 4.2    CHLORIDE 108 103   CO2 23 29   BUN 14 18   CR 0.81 0.82   GLC 96 82   THIERRY 9.1 9.4       Liver panel:  Recent Labs   Lab Test 05/13/18  2222 01/01/18  0057   PROTTOTAL 7.6 6.3*   ALBUMIN 4.1 3.1*   BILITOTAL 1.3 0.8   ALKPHOS 95 66   AST 28 46*   ALT 25 32       INR:  Recent Labs   Lab Test 02/24/18  1315 01/01/18  0057 12/31/17  0724   INR 1.32* 3.26* 2.74*        Lipid Profile:No lab results found.    A1C: No lab results found.    Troponin I:   Recent Labs   Lab Test 05/17/18  0606 05/14/18  1028 05/14/18  0534   TROPI 0.068* 0.299* 0.355*

## 2019-09-03 LAB — INTERPRETATION ECG - MUSE: NORMAL

## 2019-09-03 PROCEDURE — 12000001 ZZH R&B MED SURG/OB UMMC

## 2019-09-03 ASSESSMENT — ACTIVITIES OF DAILY LIVING (ADL)
ADLS_ACUITY_SCORE: 26
ADLS_ACUITY_SCORE: 22
ADLS_ACUITY_SCORE: 26
ADLS_ACUITY_SCORE: 26

## 2019-09-03 NOTE — PLAN OF CARE
Patient with minimal verbalizations and interactions overnight. No outward signs of pain. Repositioned q2 hours for comfort. Lemons inserted for end of life care. Oral cares performed q2 hours with oral swabs and suctioning.

## 2019-09-03 NOTE — PROGRESS NOTES
[facility], [place]    Vascular Neurology Progress Note      Interval History   Afebrile, hypertensive to 150s, saturating 90s on room air.     Labs unremarkable     Assessment & Plan   89 year old female who has a PMHx of atrial fibrillation, MDD, anxiety, TIA and cerebral infarct without residual deficits, who presents to the Emergency Department via EMS from nursing home for evaluation of right-sided facial droop and left-sided weakness.   The patient was seen for concern for right MCA in patient who has been off AC for 5 months due to epistaxis and intolerance to different agents (elective with known risks of stroke).  Likely mechanism is related to discontinuation of AC.  CT reveals near complete right stroke, ASPECT <6 appears off source image distal ica vs proximal mca on CTA head/neck.    Comfort Cares order placed, patient to be admitted to stroke service, minimize neuro checks, vitals, and lab draws.  Expect swelling to increase and patient may pass in next several days.  This plan was discussed with patient and daughter.  DNR/DNI is per patient's wishes as there is not meaningful expected recovery.  She was MRS 0 or 1 prior to this event.        Impression:     Comfort  -continuing discussion with family. 30 minute conversation about prognosis and goals of care 9/1.   - Will discuss with  today about potential discharge to hospice care.       #Right MCA stroke, completed  #Likely aspiration event related to #1     Acute Ischemic Stroke (without tPA) Plan  - Admit to Neurology  - Neurochecks once upon reaching unit and comfort care (minimal checks) thereafter  - Permissive HTN; labetalol PRN for SBP > 220  - Avoid hypotonic IV fluids  - Daily aspirin will not be initiated mg for secondary stroke prevention  - Statin: NA, on comfort care  - Stroke Education  - Depression Screen  - Apnea Screen  - Euthermia, Euglycemia    Prophylaxis          For VTE Prevention:  -  pneumatic compression device    Discussed plan of care with fellow Dr. Marie, attending Dr. Nellie Wilson MD  Neurology PGY-1      ____________________________________________________________________      Medications     Scheduled medications      Infusion medications    - MEDICATION INSTRUCTIONS -         PRN medications  Current Facility-Administered Medications   Medication Dose Route Frequency     acetaminophen  650 mg Rectal Q6H PRN     acetaminophen  650 mg Oral Q6H PRN     albuterol  2 puff Inhalation Q4H PRN     atropine  1-2 drop Sublingual Q1H PRN     [START ON 9/4/2019] bisacodyl  10 mg Rectal Once PRN     glycopyrrolate  0.2-0.4 mg Intravenous Q4H PRN     haloperidol lactate  0.5-1 mg Intravenous Q1H PRN     hypromellose-dextran  1-2 drop Both Eyes Q8H PRN     LORazepam  0.5-1 mg Intravenous Q3H PRN    Or     LORazepam  0.5-1 mg Oral Q3H PRN    Or     LORazepam  0.5-1 mg Sublingual Q3H PRN     - MEDICATION INSTRUCTIONS -   Does not apply Continuous PRN     morphine  1-2 mg Intravenous Q1H PRN     morphine HIGH CONCENTRATE  2.6-10 mg Oral Q1H PRN     naloxone  0.1-0.4 mg Intravenous Q2 Min PRN     OLANZapine zydis  2.5-5 mg Oral Q6H PRN     ondansetron  4 mg Oral Q6H PRN    Or     ondansetron  4 mg Intravenous Q6H PRN       Allergies   Allergies   Allergen Reactions     Penicillins Swelling       Physical Exam          General: Adult, lying in bed with eyes closed, gasping for air  HEENT: NC/AT, no icterus, op pink and moist  Neuro exam on admission. Minimal exam performed as patient is comfort care.   Mental status: somnolent, effort required to arouse. Oriented to self. Follows some commands but not consistently. Speech fluency is impaired, comprehension and repetition are all apparently intact. High degree of dysarthria.  Cranial nerves: Eyes deviated to the right, do not cross midline, PERRLA, visual fields partial (right only), face asymmetric, hearing intact to  conversation.  Motor: Tone normal with degree of paratonia. Minimal to no movement LUE,, withdraws left lower extremity able to lift right arm and leg.  No abnormal movements noted (no myoclonus). Asterixis is not present.  Reflexes: hypereflexic and symmetric biceps, brachioradialis, patellar, and achilles. Toes down-going.  Sensory: Intact to pain x 3 extremities (not left upper)  Coordination/gait: unable to perform - mental status    Stroke Scales    STROKE DATA     Stroke Code:  Stroke code not activated.  Time patient seen:  09/01/2019 1125  Last known normal (pt's baseline):  8/31/19 1800     TPA treatment:  Not given due to outside the time window.      National Institutes of Health Stroke Scale (at presentation)  NIHSS done at:  time patient seen        Score    Level of consciousness:  (2)   Not alert; repeat stim to attend strong stim/pain to move     LOC questions:  (2)   Answers neither question correctly    LOC commands:  (2)   Performs neither task correctly    Best gaze:  (2)   Forced deviation    Visual:  (2)   Complete hemianopia    Facial palsy:  (1)   Minor paralysis (flat nasolabial fold, smile asymmetry)    Motor arm (left):  (4)   No movement    Motor arm (right):  (2)   Some effort against gravity    Motor leg (left):  (2)   Some effort against gravity    Motor leg (right):  (2)   Some effort against gravity    Limb ataxia:  UN    Sensory:  Withdraws to pain on right only    Best language:  (3)   Mute- global aphasia    Dysarthria:  UN Intubated or other physical barrier: gasping for breath    Extinction and inattention:  (1)   Visual, tacile, auditory, spatial, person inattention          NIHSS Total Score:  24         Data     Imaging  I personally reviewed all imaging     Labs:  CBC:  Recent Labs   Lab 09/01/19  1118   WBC 10.5   RBC 4.37   HGB 14.0   HCT 44.8          Basic Metabolic Panel:   Recent Labs   Lab Test 09/01/19  1118 05/17/18  0606    140   POTASSIUM 4.6 4.2    CHLORIDE 108 103   CO2 23 29   BUN 14 18   CR 0.81 0.82   GLC 96 82   THIERRY 9.1 9.4       Liver panel:  Recent Labs   Lab Test 05/13/18  2222 01/01/18  0057   PROTTOTAL 7.6 6.3*   ALBUMIN 4.1 3.1*   BILITOTAL 1.3 0.8   ALKPHOS 95 66   AST 28 46*   ALT 25 32       INR:  Recent Labs   Lab Test 02/24/18  1315 01/01/18  0057 12/31/17  0724   INR 1.32* 3.26* 2.74*        Lipid Profile:No lab results found.    A1C: No lab results found.    Troponin I:   Recent Labs   Lab Test 05/17/18  0606 05/14/18  1028 05/14/18  0534   TROPI 0.068* 0.299* 0.355*

## 2019-09-03 NOTE — PLAN OF CARE
Pt on comfort cares, Patient with minimal verbalizations and interactions this shift. No outward signs of pain. Repositioned q2 hours for comfort. Lemons for end of life care. Oral cares performed q2 hours with oral swabs and suctioning. Pt moving right side spont. Daughter and family present this afternoon. SW consulted for Hospice placement.

## 2019-09-03 NOTE — PLAN OF CARE
Pt on comfort cares. No pain meds indicated during shift (1704-8720). Morphine given this morning for moaning. LS dim at baseline. Turn/repo q2h, lastly at 1845, breathing in regular pattern.

## 2019-09-04 PROCEDURE — 12000001 ZZH R&B MED SURG/OB UMMC

## 2019-09-04 PROCEDURE — 25000132 ZZH RX MED GY IP 250 OP 250 PS 637: Performed by: STUDENT IN AN ORGANIZED HEALTH CARE EDUCATION/TRAINING PROGRAM

## 2019-09-04 RX ADMIN — MORPHINE SULFATE 5 MG: 100 SOLUTION ORAL at 15:34

## 2019-09-04 RX ADMIN — MORPHINE SULFATE 2.6 MG: 100 SOLUTION ORAL at 14:11

## 2019-09-04 ASSESSMENT — ACTIVITIES OF DAILY LIVING (ADL)
ADLS_ACUITY_SCORE: 22

## 2019-09-04 NOTE — PROGRESS NOTES
Nurse Care Coordination was consulted to assess needs of patient following stroke. Chart was reviewed and discussed with interdisciplinary team. Nurse care coordination needs are not identified at this time and social work will continue to follow. Please re-consult nurse care coordinator if additional needs are identified.

## 2019-09-04 NOTE — PROGRESS NOTES
[facility], [place]    Vascular Neurology Progress Note      Interval History   Patient resting comfortably in bed. SW working on hospice. I attempted to get in touch w/ family, called daughter however could not reach her and left her a message.     Assessment & Plan   89 year old female who has a PMHx of atrial fibrillation, MDD, anxiety, TIA and cerebral infarct without residual deficits, who presents to the Emergency Department via EMS from nursing home for evaluation of right-sided facial droop and left-sided weakness.   The patient was seen for concern for right MCA in patient who has been off AC for 5 months due to epistaxis and intolerance to different agents (elective with known risks of stroke).  Likely mechanism is related to discontinuation of AC.  CT reveals near complete right stroke, ASPECT <6 appears off source image distal ica vs proximal mca on CTA head/neck.    Comfort Cares order placed, patient to be admitted to stroke service, minimize neuro checks, vitals, and lab draws.  Expect swelling to increase and patient may pass in next several days.  This plan was discussed with patient and daughter.  DNR/DNI is per patient's wishes as there is not meaningful expected recovery.  She was MRS 0 or 1 prior to this event.              ____________________________________________________________________      Medications     Scheduled medications      Infusion medications    - MEDICATION INSTRUCTIONS -         PRN medications  Current Facility-Administered Medications   Medication Dose Route Frequency     acetaminophen  650 mg Rectal Q6H PRN     acetaminophen  650 mg Oral Q6H PRN     albuterol  2 puff Inhalation Q4H PRN     atropine  1-2 drop Sublingual Q1H PRN     bisacodyl  10 mg Rectal Once PRN     glycopyrrolate  0.2-0.4 mg Intravenous Q4H PRN     haloperidol lactate  0.5-1 mg Intravenous Q1H PRN     hypromellose-dextran  1-2 drop Both Eyes Q8H PRN     LORazepam  0.5-1 mg  Intravenous Q3H PRN    Or     LORazepam  0.5-1 mg Oral Q3H PRN    Or     LORazepam  0.5-1 mg Sublingual Q3H PRN     - MEDICATION INSTRUCTIONS -   Does not apply Continuous PRN     morphine  1-2 mg Intravenous Q1H PRN     morphine HIGH CONCENTRATE  2.6-10 mg Oral Q1H PRN     naloxone  0.1-0.4 mg Intravenous Q2 Min PRN     OLANZapine zydis  2.5-5 mg Oral Q6H PRN     ondansetron  4 mg Oral Q6H PRN    Or     ondansetron  4 mg Intravenous Q6H PRN       Allergies   Allergies   Allergen Reactions     Penicillins Swelling       Physical Exam          General: Adult, lying in bed with eyes closed  HEENT: NC/AT, no icterus, op pink and moist  Neuro exam on admission. Minimal exam performed as patient is comfort care.   Sleeping in bed , appears comfortable. Not moving any of her extremities spontaneously.    Rest of exam deferred.

## 2019-09-04 NOTE — PLAN OF CARE
Pt on comfort cares. She was turned and repo'd q2h with oral cares. She has a kirkland in currently with adequate urine output this shift, kirkland care performed. She had a visitor this shift but no one is currently with her. She appeared to be comfortable without pain because she was not displaying signs of pain.

## 2019-09-04 NOTE — PROGRESS NOTES
Social Work Services Progress Note    Hospital Day: 4  Date of Initial Social Work Evaluation:  Not applicable  Collaborated with:  Dr. Marie,  Dr. Shama Wilson, pt's daughters (Tracey and Mariel Dalton), son in law (Jimmy), [facility], [facility] and [facility]     Data:  Pt resides in assisted living at [facility].  Pt was admitted with a new stroke.   Pt is comfort care only.      Intervention:  SW spoke with Dr. Marie and  Dr. Shama Wilson both of whom indicate that pt is medically ready for discharge with hospice services.  SW met with pt's family to discuss hospice.  All agree that pain control, symptom management, supportive environment and support from family are their goals.  All agree with plan to pursue hospice.  Home with hospice, SNF with hospice and residential hospice were discussed.  Per discussion, placement at [facility] is first choice as they are familiar with the program (pt's  stayed their until his death), followed by return to [facility] with contracted hospice services through [facility].  SW phoned [facility] and spoke with Mary Mitchell in Admissions.  Per , they are full with no anticipated openings before next week. SW updated family.  RENY phoned [facility] and spoke with RN Fausto Altman.  Jimy confirms that pt can return to her assisted living apt with contracted hospice services.  RENY phone [facility] and spoke with Jazzy ([phone]). SW referred pt for home hospice services and faxed assessment materials.  Per Jazzy, they will complete a hospice intake at [facility] on 9/5/19 at 9:30am.  Per this RENY's discussion with family, they do not want pt to return to home until the home hospice start date is determined and in place.      Assessment:  Pt's family is supportive and would  like pt to receive hospice services.      Plan:    Anticipated Disposition:  Home (assisted living) with contracted hospice services on 9/5/19    Barriers to d/c plan:  Hospice intake to take place at [facility] on 9/5/19 at 9:30am.    Follow Up:  SW will coordinate discharge.     MILDRED Champion  Social Work, 6A  Phone:  [phone]  Pager:  [phone]  9/5/2019

## 2019-09-04 NOTE — PLAN OF CARE
Comfort cares. Turned and repositioned every two hours. Oral care PRN. Lemons intact. Does not appear in pain.

## 2019-09-04 NOTE — PROGRESS NOTES
[facility], [place]    Vascular Neurology Progress Note      Interval History   Patient resting comfortably in bed. SW working on hospice. I attempted to get in touch w/ family, called daughter however could not reach her and left her a message.     Assessment & Plan   89 year old female who has a PMHx of atrial fibrillation, MDD, anxiety, TIA and cerebral infarct without residual deficits, who presents to the Emergency Department via EMS from nursing home for evaluation of right-sided facial droop and left-sided weakness.   The patient was seen for concern for right MCA in patient who has been off AC for 5 months due to epistaxis and intolerance to different agents (elective with known risks of stroke).  Likely mechanism is related to discontinuation of AC.  CT reveals near complete right stroke, ASPECT <6 appears off source image distal ica vs proximal mca on CTA head/neck.    Comfort Cares order placed, patient to be admitted to stroke service, minimize neuro checks, vitals, and lab draws.  Expect swelling to increase and patient may pass in next several days.  This plan was discussed with patient and daughter.  DNR/DNI is per patient's wishes as there is not meaningful expected recovery.  She was MRS 0 or 1 prior to this event.              ____________________________________________________________________      Medications     Scheduled medications      Infusion medications    - MEDICATION INSTRUCTIONS -         PRN medications  Current Facility-Administered Medications   Medication Dose Route Frequency     acetaminophen  650 mg Rectal Q6H PRN     acetaminophen  650 mg Oral Q6H PRN     albuterol  2 puff Inhalation Q4H PRN     atropine  1-2 drop Sublingual Q1H PRN     bisacodyl  10 mg Rectal Once PRN     glycopyrrolate  0.2-0.4 mg Intravenous Q4H PRN     haloperidol lactate  0.5-1 mg Intravenous Q1H PRN     hypromellose-dextran  1-2 drop Both Eyes Q8H PRN     LORazepam  0.5-1 mg  Intravenous Q3H PRN    Or     LORazepam  0.5-1 mg Oral Q3H PRN    Or     LORazepam  0.5-1 mg Sublingual Q3H PRN     - MEDICATION INSTRUCTIONS -   Does not apply Continuous PRN     morphine  1-2 mg Intravenous Q1H PRN     morphine HIGH CONCENTRATE  2.6-10 mg Oral Q1H PRN     naloxone  0.1-0.4 mg Intravenous Q2 Min PRN     OLANZapine zydis  2.5-5 mg Oral Q6H PRN     ondansetron  4 mg Oral Q6H PRN    Or     ondansetron  4 mg Intravenous Q6H PRN       Allergies   Allergies   Allergen Reactions     Penicillins Swelling       Physical Exam          General: Adult, lying in bed with eyes closed  HEENT: NC/AT, no icterus, op pink and moist  Neuro exam on admission. Minimal exam performed as patient is comfort care.   Sleeping in bed , appears comfortable. Not moving any of her extremities spontaneously.    Rest of exam deferred.

## 2019-09-05 PROCEDURE — 25000132 ZZH RX MED GY IP 250 OP 250 PS 637: Performed by: STUDENT IN AN ORGANIZED HEALTH CARE EDUCATION/TRAINING PROGRAM

## 2019-09-05 PROCEDURE — 25000125 ZZHC RX 250: Performed by: INTERNAL MEDICINE

## 2019-09-05 RX ORDER — GLYCOPYRROLATE 0.2 MG/ML
.2-.4 INJECTION, SOLUTION INTRAMUSCULAR; INTRAVENOUS EVERY 4 HOURS PRN
Start: 2019-09-05

## 2019-09-05 RX ORDER — ATROPINE SULFATE 10 MG/ML
1-2 SOLUTION/ DROPS OPHTHALMIC
Start: 2019-09-05

## 2019-09-05 RX ADMIN — ATROPINE SULFATE 2 DROP: 10 SOLUTION/ DROPS OPHTHALMIC at 01:03

## 2019-09-05 RX ADMIN — MORPHINE SULFATE 5 MG: 100 SOLUTION ORAL at 11:57

## 2019-09-05 RX ADMIN — ATROPINE SULFATE 2 DROP: 10 SOLUTION/ DROPS OPHTHALMIC at 06:09

## 2019-09-05 RX ADMIN — ATROPINE SULFATE 2 DROP: 10 SOLUTION/ DROPS OPHTHALMIC at 11:57

## 2019-09-05 RX ADMIN — ATROPINE SULFATE 2 DROP: 10 SOLUTION/ DROPS OPHTHALMIC at 02:22

## 2019-09-05 ASSESSMENT — ACTIVITIES OF DAILY LIVING (ADL)
ADLS_ACUITY_SCORE: 22

## 2019-09-05 NOTE — DISCHARGE SUMMARY
[facility], [place]    Neurology Stroke Discharge Summary    Date of Admission: 9/1/2019  Date of Discharge: 09/05/2019    Disposition: Discharged to hospice  Primary Care Physician: Mary Ellen Arevalo      Admission Diagnosis:   A-fib  L sided weakness    Discharge Diagnosis:   Ischemic Stroke due to cardioembolism   Comfort cares    Problem Leading to Hospitalization (from HPI):   89 year old female who has a PMHx of atrial fibrillation, MDD, anxiety, TIA and cerebral infarct without residual deficits, who presents to the Emergency Department via EMS from nursing home for evaluation of right-sided facial droop and left-sided weakness.     Please see H&P dated 9/1/2019 for further details about presentation.    Brief Hospital Course:   89 year old female who has a PMHx of atrial fibrillation, MDD, anxiety, TIA and cerebral infarct without residual deficits, who presents to the Emergency Department via EMS from nursing home for evaluation of right-sided facial droop and left-sided weakness.     Patient found to have CT scan revealing near complete R MCA stroke with ASPECT <6. Conversations with family ensued and patient was transitioned to comfort cares. Patient was discharged to hospice.     Etiology is thought to be cardioembolic in the setting of A-fib not on AC.      Rehab evaluation: no services needed due to comfort cares.     Smoking Cessation: comfort cares    BP Long-term Goal: will not monitor BP as patient on comfort cares    Antithrombotic/Anticoagulant Agent: not ordered due to comfort cares    Statins: not ordered due to comfort cares       Hgb A1C Goal: < 7.0    Complications: None.     Other problems addressed during this hospitalization:  None    PERTINENT INVESTIGATIONS  No workup needed due to comfort care initiation.     PHYSICAL EXAMINATION  Vital Signs:  B/P: 146/64, T: 97.8, P: 48, R: 15  General: Patient is lying in bed sleeping. Patient appears comfortable.  Further examination is deferred due to patient being on comfort cares.       Modified [place] Scale (on day of discharge): 5-Severe disability; bedridden, incontinent and requiring constant nursing care and attention    Medications    Current Discharge Medication List      START taking these medications    Details   atropine 1 % ophthalmic solution Place 1-2 drops under the tongue every hour as needed for other (secretions)    Associated Diagnoses: Acute ischemic right MCA stroke (H)      glycopyrrolate (ROBINUL) 0.2 MG/ML injection Inject 1-2 mLs (0.2-0.4 mg) into the vein every 4 hours as needed for other (secretions)    Associated Diagnoses: Acute ischemic right MCA stroke (H)         STOP taking these medications       ACETAMINOPHEN PO Comments:   Reason for Stopping:         ACETAMINOPHEN PO Comments:   Reason for Stopping:         albuterol (PROAIR HFA/PROVENTIL HFA/VENTOLIN HFA) 108 (90 Base) MCG/ACT Inhaler Comments:   Reason for Stopping:         fluticasone (FLONASE) 50 MCG/ACT spray Comments:   Reason for Stopping:         furosemide (LASIX) 20 MG tablet Comments:   Reason for Stopping:         METOPROLOL SUCCINATE ER PO Comments:   Reason for Stopping:         PAROXETINE HCL PO Comments:   Reason for Stopping:         Rivaroxaban (XARELTO PO) Comments:   Reason for Stopping:         sodium chloride (OCEAN) 0.65 % nasal spray Comments:   Reason for Stopping:         sodium chloride (OCEAN) 0.65 % nasal spray Comments:   Reason for Stopping:         VITAMIN D, CHOLECALCIFEROL, PO Comments:   Reason for Stopping:               Additional recommendations and follow up:       General info for SNF    Length of Stay Estimate: Short Term Care: Estimated # of Days <30  Condition at Discharge: Terminal  Level of care:skilled   Rehabilitation Potential: Poor  Admission H&P remains valid and up-to-date: Yes  Recent Chemotherapy: N/A  Use Nursing Home Standing Orders: Yes     Reason for your hospital stay    You were  hospitalized for acute ischemic stroke.     Mantoux instructions    Give two-step Mantoux (PPD) Per Facility Policy No (if no explain) Comfort care patient     Follow Up and recommended labs and tests    No follow up needed. Hospice care.     Activity - Up with nursing assistance     DNR/DNI     Advance Diet as Tolerated    Follow this diet upon discharge: Orders Placed This Encounter      NPO for Medical/Clinical Reasons Except for: No Exceptions       Patient was seen and discussed with the Attending, Dr. Ballard.    Daniele Lew MD  Pager: [phone]

## 2019-09-05 NOTE — DISCHARGE INSTRUCTIONS
Home Hospice Services to be provided by [facility] ([phone]), Services to begin on 9/5/19 between 1 and 1:30pm.

## 2019-09-05 NOTE — PLAN OF CARE
Pt here on comfort cares after a R sided stroke. She was turned q2h and had oral cares. Family was present at discharge. AVS was printed and given to family. All questions were addressed and answered. She was discharged to her home with hospice care. She was taken off the unit on a stretcher with her family present.

## 2019-09-05 NOTE — PLAN OF CARE
Comfort care. Turned and repositioned every two hours. Oral cares PRN. Lemons in place. Atropine given x2. No outward indications of discomfort.

## 2019-09-05 NOTE — PLAN OF CARE
Comfort cares in place. Pt was turned and repositioned every 2 hours, PRN oral care. Lemons in place. Given morphine x1, pt appears to be comfortable.

## 2019-09-05 NOTE — PROGRESS NOTES
"Social Work Services Discharge Note      Patient Name:  Brie Gonzalez     Anticipated Discharge Date:  9/5/19    Discharge Disposition:   Return to [facility] ([phone]) with contracted hospice services to be provided by [facility] ([phone])    Following MD:  PCP and Medical Director of [facility].          Additional Services/Equipment Arranged:  Dr.Tanner Lew, Neurology, has confirmed readiness for discharge.  RENY spoke with Fausto Altman RN at [facility] to confirm pt's return to assistive living today with home hospice services. RENY spoke with Kinjal Garcia, Director of [facility].  Per Kinjal, her agency will fill pt's scripts. [facility] should not fill the discharge medications.  Per Kinjal, [facility] will initiate services at pt's home today between 1 and 1:30pm.  Per Kinjal, [facility] Medical Director will determine and order meds for comfort and symptom management as needed and thus, Kinjal states that [facility] does not need to add any add'l medications to the discharge orders.  RENY has arranged for [facility] [employer] [facility] (Max [phone]) to provide stretcher transport to home at 12:30pm today.  RENY has completed a \"Physicians Certification for Transportation\" form.       Patient / Family response to discharge plan:  Pt's family daughters (Tracey and Mariel Dalton) and son in law (Jimmy) voice understanding of the discharge plan and agreement with the discharge plan.     Persons notified of above discharge plan:  Daughters and son in law, Dr. Lew and  nursing.      Staff Discharge Instructions:  Please fax discharge orders and signed hard scripts for any controlled substances (SW will fax discharge orders to [facility] and to [facility]..  Please print a packet and send with patient.     CTS Handoff completed:  " YES    Medicare Notice of Rights provided to the patient/family:  NO, as [employer] manages pt's medicare benefits.    MILDRED Champion  Social Work, 6A  Phone:  [phone]  Pager:  [phone]  9/5/2019

## 2019-09-12 ENCOUNTER — AMBULATORY - HEALTHEAST (OUTPATIENT)
Dept: OTHER | Facility: CLINIC | Age: 84
End: 2019-09-12

## 2019-09-12 ENCOUNTER — DOCUMENTATION ONLY (OUTPATIENT)
Dept: OTHER | Facility: CLINIC | Age: 84
End: 2019-09-12

## 2021-05-31 VITALS — HEIGHT: 65 IN | WEIGHT: 122.6 LBS | BODY MASS INDEX: 20.43 KG/M2

## 2021-05-31 VITALS — HEIGHT: 65 IN | BODY MASS INDEX: 20.43 KG/M2 | WEIGHT: 122.6 LBS

## 2021-06-14 NOTE — PROGRESS NOTES
"[facility]    Name:   Brie Gonzalez  : 1925  Facility:   [facility] [[id number]]   Room: [facility]  Code Status: FULL CODE -   Fac type:   SNF (Skilled Nursing Facility, TCU) -     CHIEF COMPLAINT / REASON FOR VISIT:  Chief Complaint   Patient presents with     Review Of Multiple Medical Conditions     TCU follow-up after hospital stay for epistaxis.    Patient was last seen by me on 17.    HPI: Brie is a 89 y.o. female with atrial fibrillation who is on warfarin.  With the onset of a nosebleed, she went to her PCP who performed electrocautery on her right nasal passage on 17.  The following morning, at about 3 AM, she started bleeding again, this time through both nostrils, and she went to the [facility] emergency department where the emergency room physician placed bilateral Merocel packings.  Her blood pressure has been elevated but normalized, and the bleeding seemed to have stopped.  She was then seen by ENT, and they packed the nasal passage with additional Surgi-Juliocesar with thrombin and Gelfoam dissolvable packing.  She was then admitted to the observation area for further monitoring.  Serial hemoglobins were stable (12.8 and then 12.1).  Her INR was 2.57.  The ENT recommended Keflex for staph prevention due to the nasal packing, and the plan was to have her follow-up at the ENT clinic for packing removal in 5-7 days.    Because her blood pressure remained elevated throughout her hospital stay, her metoprolol was increased to 50 mg.  Her daughter requested TCU placement, acknowledged that it would be private pay for observation time at the [facility].    She did have an episode of diverticulitis about 5 weeks ago and was on Flagyl and Cipro; however, she tells me that the combination made her feel \"crazy,\", and these meds were eventually stopped when she developed the nosebleeds.        CURRENT ISSUES    She is not having any pain " "whatsoever.  The packing was removed yesterday, and she tells me that now she can breathe through her nose.  When she was discharged, they did provide her with Afrin and a nasal clamp.    She has had no further problems with abdominal pain and, currently, no nasal pain either.    She has been living independently and did call 911 when the bleeding became severe.  She is not expected to discharge back to her appointment but instead will be moving to [facility] assisted living with a goal of this coming Friday, 12/22/17.    When last seen, she told me she suffered from anxiety, telling me, \"I have had this anxiety situation, especially at night.\"  Today, she tells me it is more \"body shakes\" or \"tremors.\"  The situation only occurs at night.  She does have orders for her for lorazepam.  When she came in, it was for 1 mg every 8 hours as needed, but this was felt to be excessive, and the dose was reduced to 0.5 mg every 8 hours as needed.     She is on warfarin 3 mg daily.  Today's INR is 2.0.  We are going to keep the same dose, and she should be rechecked in 1 week.    ROS: No headaches or chest pains, coughing or congestion, nausea or vomiting, dizziness or dyspnea, dysuria, constipation or diarrhea (although there is occasional gas), difficulty chewing or swallowing, integumentary issues, or problems with sleep.  She tells me she is eating well.    Past Medical History:   Diagnosis Date     Anxiety      Atrial fibrillation      Depression      Diverticulitis of intestine      Epistaxis      Fracture of spine, cervical, without spinal cord injury, closed      HTN (hypertension)      Mastoiditis      TIA (transient ischemic attack)               No family history on file.  Social History     Social History     Marital status:      Spouse name: N/A     Number of children: N/A     Years of education: N/A     Social History Main Topics     Smoking status: Never Smoker     Smokeless tobacco: Never Used     " "Alcohol use Yes      Comment: rare     Drug use: No     Sexual activity: Not on file     Other Topics Concern     Not on file     Social History Narrative     No narrative on file     MEDICATIONS: Reviewed from the MAR, physician orders, and earlier progress notes.    Current Outpatient Prescriptions   Medication Sig     acetaminophen (TYLENOL) 325 MG tablet Take 325 mg by mouth every 4 (four) hours as needed.     aspirin 81 MG EC tablet Take 81 mg by mouth every other day.     calcium carbonate-vit D3-min 600 mg calcium- 400 unit Tab Take 1 tablet by mouth daily.     cholecalciferol, vitamin D3, 1,000 unit tablet Take 1,000 Units by mouth daily.     LORazepam (ATIVAN) 1 MG tablet Take 0.5 mg by mouth every 8 (eight) hours as needed.      metoprolol tartrate (LOPRESSOR) 25 MG tablet Take 50 mg by mouth daily.     oxymetazoline (NASAL SPRAY EXTRA MOISTURIZING) 0.05 % nasal spray 2 sprays into each nostril 2 (two) times a day as needed. And 2 sprays daily as needed     PARoxetine (PAXIL) 10 MG tablet Take 10 mg by mouth daily.     simvastatin (ZOCOR) 20 MG tablet Take 20 mg by mouth daily.     sodium chloride 0.65 % Drop 1 spray into each nostril every 2 (two) hours.     warfarin (COUMADIN) 1 MG tablet Take by mouth See Admin Instructions. Adjust dose based on INR results as directed.     ALLERGIES:   Allergies   Allergen Reactions     Penicillins Swelling     DIET: Regular, regular texture, thin liquids.  Mighty Shake twice daily.    Vitals:    12/18/17 1302   BP: 127/62   Pulse: 66   Resp: 20   Temp: 99.2  F (37.3  C)   Weight: 122 lb 9.6 oz (55.6 kg)   Height: 5' 5\" (1.651 m)     Body mass index is 20.4 kg/(m^2).    EXAMINATION:   General: Pleasant, alert, and conversant elderly female, sitting comfortably, in no apparent distress.    Head: Normocephalic and atraumatic.   Eyes: PERRLA, sclerae clear.   ENT: Moist oral mucosa.   Cardiovascular: Irregularly irregular rhythm with a 4/6 systolic ejection murmur at the " left sternal border.  Respiratory: Lungs clear to auscultation bilaterally.   Abdomen: Soft and nontender.   Musculoskeletal/Extremities: Age-related degenerative joint disease with noted enlargement of DIPs.  The right middle finger DIP is deformed.  No peripheral edema.  Integument: No rashes, clinically significant lesions, or skin breakdown.   Cognitive/Psychiatric: Alert and oriented ×3.  Affect is euthymic.    DIAGNOSTICS:   No results found for this or any previous visit.  No results found for: WBC, HGB, HCT, MCV, PLT  CrCl cannot be calculated (No order found.).    ASSESSMENT/Plan:      ICD-10-CM    1. Atrial fibrillation, unspecified type I48.91    2. History of epistaxis Z87.898    3. Anxiety F41.9    4. Essential hypertension I10    5. H/O diverticulitis of colon Z87.19      CHANGES:    Today's INR is 2.0.  Continue Coumadin at 3 mg daily and recheck an INR in 1 week.    CARE PLAN:    The care plan has been reviewed and all orders signed. Changes to care plan, if any, as noted. Otherwise, continue care plan of care.      The above has been created using voice recognition software. Please be aware that this may unintentionally  produce inaccuracies and/or nonsensical sentences.      Electronically signed by: Umair Latif CNP

## 2021-06-14 NOTE — PROGRESS NOTES
[facility]      Facility:    [facility] [[id number]]    Code Status: FULL CODE   [facility]  - 17      Chief Complaint/Reason for Visit:  Chief Complaint   Patient presents with     H & P       HPI:   Brie is a 89 y.o. female who has atrial fibrillation on Coumadin.  The onset of a nosebleed, went to her primary care physician who electrocautery her right nasal passage on 17.  On the morning of  about 4 AM, she started bleeding again.  She  came to the emergency department, the emergency room physician placed bilateral Merocel packings.  Her blood pressure had been elevated but normalized, the bleeding seemed to have stopped.  She was then seen by ENT, and they packed the nasal passage with additional Surgi-Juliocesar with thrombin and Gelfoam dissolvable packing.  She was admitted to the observation area for further monitoring.  Serial hemoglobins were stable: 12.8, next 12.1.  INR was 2.57.  ENT recommended Keflex for staph prevention due to the nasal packing.  Plan was to have her follow-up at the ENT clinic for packing removal in 5-7 days.    Her blood pressure remained elevated throughout her hospital stay.  Her metoprolol was increased to 50 mg the.    1 her warfarin was used, aspirin every other day.  INR was stable in the 2.5-2.57 range.    Brie and her daughter requested TCU placement, acknowledged it would be private pay for observation time at the [facility].        Past Medical History:  Past Medical History:   Diagnosis Date     Anxiety      Atrial fibrillation      Depression      Diverticulitis of intestine      Epistaxis      Fracture of spine, cervical, without spinal cord injury, closed      HTN (hypertension)      Mastoiditis      TIA (transient ischemic attack)            Surgical History:  Tonsillectomy/adenoidectomy  Appendectomy  Hernia repair    Family History:   Parents     Social History:    Social History     Social  "History     Marital status:      Spouse name: N/A     Number of children: N/A     Years of education: N/A     Social History Main Topics     Smoking status: Never Smoker     Smokeless tobacco: Never Used     Alcohol use Yes      Comment: rare     Drug use: No     Sexual activity: Not on file         Review of Systems   Had an episode of diverticulitis about 4 weeks ago, was on Flagyl and Cipro by her lack recollection it made her feel \"crazy\".  No further problems with abdominal pain  Mild nasal discomfort  Very busy and independent, no longer drives  The remainder of the comprehensive review of systems is negative    Vitals:    12/08/17 1000   BP: 114/72   Pulse: 70   Resp: 20   Temp: 98.8  F (37.1  C)   SpO2: 94%       Physical Exam   Constitutional: She is oriented to person, place, and time.    pleasant, elderly   female   HENT:   Mouth/Throat: Oropharynx is clear and moist.   Both nasal cavities packed, distal packing has dissolved in the right nostril, some serosanguinous discharge   Eyes: Conjunctivae and EOM are normal. No scleral icterus.   Cardiovascular: Regular rhythm and normal heart sounds.    No murmur heard.  Pulmonary/Chest: Breath sounds normal. She has no wheezes. She has no rales.   Abdominal: Soft. Bowel sounds are normal. There is no tenderness. There is no rebound.   Musculoskeletal: Normal range of motion. She exhibits no edema.   Lymphadenopathy:     She has no cervical adenopathy.   Neurological: She is alert and oriented to person, place, and time. Coordination normal.   Skin: Skin is warm. No rash noted. No erythema.   Psychiatric: She has a normal mood and affect. Her behavior is normal. Judgment and thought content normal.       Medication List:  Current Outpatient Prescriptions   Medication Sig     acetaminophen (TYLENOL) 325 MG tablet Take 325 mg by mouth every 4 (four) hours as needed.     aspirin 81 MG EC tablet Take 81 mg by mouth every other day.     calcium " carbonate-vit D3-min 600 mg calcium- 400 unit Tab Take 1 tablet by mouth daily.     cephalexin (KEFLEX) 500 MG capsule Take 500 mg by mouth 2 (two) times a day.     cholecalciferol, vitamin D3, 1,000 unit tablet Take 1,000 Units by mouth daily.     LORazepam (ATIVAN) 1 MG tablet Take 1 mg by mouth every 8 (eight) hours as needed.     metoprolol tartrate (LOPRESSOR) 25 MG tablet Take 50 mg by mouth daily.     oxymetazoline (NASAL SPRAY EXTRA MOISTURIZING) 0.05 % nasal spray 2 sprays into each nostril 2 (two) times a day as needed. And 2 sprays daily as needed     PARoxetine (PAXIL) 10 MG tablet Take 10 mg by mouth daily.     simvastatin (ZOCOR) 20 MG tablet Take 20 mg by mouth daily.     sodium chloride 0.65 % Drop 1 spray into each nostril every 2 (two) hours.       Labs: 2/5/17  White count 7.2, initial hemoglobin 12.6, down to 12.1 the following day, platelets 228,000  Sodium 143, potassium 4.4, chloride 110, CO2 27, BUN 22, creatinine 1.17, GFR estimated 43,   calcium 9.0    Assessment:    ICD-10-CM    1. Epistaxis R04.0    2. HTN (hypertension) I10    3. Anxiety F41.9    4. Atrial fibrillation I48.91    5. Depression F32.9    6. TIA (transient ischemic attack) G45.9          Plan:  Observation, light therapies.  Return to ENT clinic beginning of the week, expect packing will be removed.  Confusion on metoprolol, discharge summary states both metoprolol 50 once a day and most metoprolol 50 twice a day, metoprolol was ordered once a day by discharging MD.   Anticipate short TCU stay      Electronically signed by: Naida Bell MD

## 2021-06-14 NOTE — PROGRESS NOTES
"[facility]    Name:   Brie Gonzalez  : 1925  Facility:   [facility] [[id number]]   Room: [facility]  Code Status: FULL CODE -   Fac type:   SNF (Skilled Nursing Facility, TCU) -     CHIEF COMPLAINT / REASON FOR VISIT:  Chief Complaint   Patient presents with     Review Of Multiple Medical Conditions     TCU follow-up after hospital stay for epistaxis.    Patient was last seen by Dr. Bell for an admission visit on 17.    HPI: Brie is a 89 y.o. female with atrial fibrillation who is on warfarin.  With the onset of a nosebleed, she went to her PCP who performed electrocautery on her right nasal passage on 17.  The following morning, at about 3 AM, she started bleeding again, this time through both nostrils, and she went to the [facility] emergency department where the emergency room physician placed bilateral Merocel packings.  Her blood pressure has been elevated but normalized, and the bleeding seemed to have stopped.  She was then seen by ENT, and they packed the nasal passage with additional Surgi-Juliocesar with thrombin and Gelfoam dissolvable packing.  She was then admitted to the observation area for further monitoring.  Serial hemoglobins were stable (12.8 and then 12.1).  Her INR was 2.57.  The ENT recommended Keflex for staph prevention due to the nasal packing, and the plan was to have her follow-up at the ENT clinic for packing removal in 5-7 days.    Because her blood pressure remained elevated throughout her hospital stay, her metoprolol was increased to 50 mg.  Even and her daughter requested TCU placement, acknowledged that it will be private pay for observation time at the [facility].    She did have an episode of diverticulitis about 4 weeks ago and was on Flagyl and Cipro; however, she tells me that the combination made her feel \"crazy,\", and these meds were eventually stopped when she got the nosebleeds.        CURRENT " "ISSUES    She is not having any pain whatsoever.  The packing was removed yesterday, and she tells me that now she can breathe through her nose.  When she was discharged, they did provide her with Afrin and a nasal clamp.    She has been living independently and did call 911 when the bleeding became severe.      She has had no further problems with abdominal pain and, currently, no nasal pain either.    She also suffers from anxiety, telling me, \"I have had this anxiety situation, especially at night.\"  She does have orders for her for lorazepam, but it is 1 mg, and that is rather excessive.  Her daughter, who lives in attendance at my second visit today, stated that it really left her out of it.    ROS: No headaches or chest pains, coughing or congestion, nausea or vomiting, dizziness or dyspnea, dysuria, constipation or diarrhea, difficulty chewing or swallowing, integumentary issues, or problems with sleep.  She has taken lorazepam once or twice since her admission from the hospital, and it has certainly oversedated her.  We are going to be decreasing the dose to one half that.    Past Medical History:   Diagnosis Date     Anxiety      Atrial fibrillation      Depression      Diverticulitis of intestine      Epistaxis      Fracture of spine, cervical, without spinal cord injury, closed      HTN (hypertension)      Mastoiditis      TIA (transient ischemic attack)               No family history on file.  Social History     Social History     Marital status:      Spouse name: N/A     Number of children: N/A     Years of education: N/A     Social History Main Topics     Smoking status: Never Smoker     Smokeless tobacco: Never Used     Alcohol use Yes      Comment: rare     Drug use: No     Sexual activity: Not on file     Other Topics Concern     Not on file     Social History Narrative     No narrative on file     MEDICATIONS: Reviewed from the MAR, physician orders, and earlier progress notes.  Updated by " "me today (12/12/17) with the decrease in lorazepam reflected below.  Current Outpatient Prescriptions   Medication Sig     warfarin (COUMADIN) 1 MG tablet Take by mouth See Admin Instructions. Adjust dose based on INR results as directed.     acetaminophen (TYLENOL) 325 MG tablet Take 325 mg by mouth every 4 (four) hours as needed.     aspirin 81 MG EC tablet Take 81 mg by mouth every other day.     calcium carbonate-vit D3-min 600 mg calcium- 400 unit Tab Take 1 tablet by mouth daily.     cephalexin (KEFLEX) 500 MG capsule Take 500 mg by mouth 2 (two) times a day.     cholecalciferol, vitamin D3, 1,000 unit tablet Take 1,000 Units by mouth daily.     LORazepam (ATIVAN) 1 MG tablet Take 0.5 mg by mouth every 8 (eight) hours as needed.      metoprolol tartrate (LOPRESSOR) 25 MG tablet Take 50 mg by mouth daily.     oxymetazoline (NASAL SPRAY EXTRA MOISTURIZING) 0.05 % nasal spray 2 sprays into each nostril 2 (two) times a day as needed. And 2 sprays daily as needed     PARoxetine (PAXIL) 10 MG tablet Take 10 mg by mouth daily.     simvastatin (ZOCOR) 20 MG tablet Take 20 mg by mouth daily.     sodium chloride 0.65 % Drop 1 spray into each nostril every 2 (two) hours.     ALLERGIES:   Allergies   Allergen Reactions     Penicillins Swelling     DIET: Regular, regular texture, thin liquids.  Mighty Shake twice daily.    Vitals:    12/12/17 1218   BP: 146/76   Pulse: 63   Resp: 20   Temp: 98.6  F (37  C)   Weight: 122 lb 9.6 oz (55.6 kg)   Height: 5' 5\" (1.651 m)     Body mass index is 20.4 kg/(m^2).    EXAMINATION:   General: Pleasant, alert, and conversant elderly female, lying on her bed, in no apparent distress.  At my second visit, her daughter is in attendance.  Head: Normocephalic and atraumatic.   Eyes: PERRLA, sclerae clear.   ENT: Moist oral mucosa.   Cardiovascular: Irregularly irregular rhythm with a 4/6 systolic ejection murmur at the left sternal border.  Respiratory: Lungs clear to auscultation " bilaterally.   Abdomen: Soft and nontender.   Musculoskeletal/Extremities: Age-related degenerative joint disease.  No peripheral edema.  Integument: No rashes, clinically significant lesions, or skin breakdown.   Cognitive/Psychiatric: Alert and oriented ×3.  Affect is euthymic.    DIAGNOSTICS:   No results found for this or any previous visit.  No results found for: WBC, HGB, HCT, MCV, PLT  CrCl cannot be calculated (No order found.).    ASSESSMENT/Plan:      ICD-10-CM    1. Atrial fibrillation, unspecified type I48.91    2. History of epistaxis Z87.898    3. Anxiety F41.9    4. HTN (hypertension) I10    5. H/O diverticulitis of colon Z87.19      CHANGES:    Decrease lorazepam from 1 mg every 8 hours to 0.5 mg every 8 hours.  Prescription has been written.    CARE PLAN:    The care plan has been reviewed and all orders signed. Changes to care plan, if any, as noted. Otherwise, continue care plan of care.  Time spent with this patient was approximately 35 minutes, with greater than 50% spent in counseling and coordination of care that included multiple visits to the patient's room, and the writing of a prescription for her dose decrease of lorazepam.    The above has been created using voice recognition software. Please be aware that this may unintentionally  produce inaccuracies and/or nonsensical sentences.      Electronically signed by: Umair Latif CNP

## 2021-06-15 NOTE — PROGRESS NOTES
[facility]    Facility:   [facility] [[id number]]     Code Status: FULL CODE  PCP: No Primary Care Provider   Phone: None   Fax: [phone]      CHIEF COMPLAINT/REASON FOR VISIT:  Chief Complaint   Patient presents with     Discharge Summary     epistaxis/ weakness       HISTORY COURSE:  has atrial fibrillation on Coumadin.  The onset of a nosebleed, went to her primary care physician who electrocautery her right nasal passage on 12/4/17.  On the morning of 12 5 about 4 AM, she started bleeding again.  She  came to the emergency department, the emergency room physician placed bilateral Merocel packings.  Her blood pressure had been elevated but normalized, the bleeding seemed to have stopped.  She was then seen by ENT, and they packed the nasal passage with additional Surgi-Juliocesar with thrombin and Gelfoam dissolvable packing.  She was admitted to the observation area for further monitoring.  Serial hemoglobins were stable: 12.8, next 12.1.  INR was 2.57.  ENT recommended Keflex for staph prevention due to the nasal packing.  Plan was to have her follow-up at the ENT clinic for packing removal in 5-7 days.    , Her pressure remained stable, her hemoglobin was checked and was stable.  She had no further bleeding.    Review of Systems    Vitals:    12/20/17 0900   BP: 103/62   Pulse: 64   Resp: 18   Temp: 97.1  F (36.2  C)   SpO2: 97%       Physical Exam     Alert oriented, pleasant.  Has moist oral mucosa, no no blood in the nares.  Cardiovascular: Irregularly irregular rhythm with a 4/6 systolic ejection murmur at the left sternal border.  Respiratory: Lungs clear to auscultation bilaterally.   Abdomen: Soft and nontender.   Musculoskeletal/Extremities: Her extremities well.  No peripheral edema.  Kin: No rashes, warm, dry  Cognitive/Psychiatric: Alert and oriented ×3.      MEDICATION LIST:  Current Outpatient Prescriptions   Medication Sig     acetaminophen (TYLENOL) 325 MG  tablet Take 325 mg by mouth every 4 (four) hours as needed.     aspirin 81 MG EC tablet Take 81 mg by mouth every other day.     calcium carbonate-vit D3-min 600 mg calcium- 400 unit Tab Take 1 tablet by mouth daily.     cholecalciferol, vitamin D3, 1,000 unit tablet Take 1,000 Units by mouth daily.     LORazepam (ATIVAN) 1 MG tablet Take 0.5 mg by mouth every 8 (eight) hours as needed.      metoprolol tartrate (LOPRESSOR) 25 MG tablet Take 50 mg by mouth daily.     oxymetazoline (NASAL SPRAY EXTRA MOISTURIZING) 0.05 % nasal spray 2 sprays into each nostril 2 (two) times a day as needed. And 2 sprays daily as needed     PARoxetine (PAXIL) 10 MG tablet Take 10 mg by mouth daily.     simvastatin (ZOCOR) 20 MG tablet Take 20 mg by mouth daily.     sodium chloride 0.65 % Drop 1 spray into each nostril every 2 (two) hours.     warfarin (COUMADIN) 1 MG tablet Take by mouth See Admin Instructions. Adjust dose based on INR results as directed.       DISCHARGE DIAGNOSIS:  1. Epistaxis R04.0     2. HTN (hypertension) I10     3. Anxiety F41.9     4. Atrial fibrillation I48.91     5. Depression F32.9     6. TIA (transient ischemic attack) G45.9            MEDICAL EQUIPMENT NEEDS:  none    DISCHARGE PLAN/FACE TO FACE:  I certify that services are/were furnished while this patient was under the care of a physician and that a physician or an allowed non-physician practitioner (NPP), had a face-to-face encounter that meets the physician face-to-face encounter requirements. The encounter was in whole, or in part, related to the primary reason for home health. The patient is confined to his/her home and needs intermittent skilled nursing, physical therapy, speech-language pathology, or the continued need for occupational therapy. A plan of care has been established by a physician and is periodically reviewed by a physician.  Date of Face-to-Face Encounter:  12/21/17    I certify that, based on my findings, the following services are  medically necessary home health services:-Home PT-home health nurse-home health aide   did buy [facility]    My clinical findings support the need for the above skilled services because: Needs help with her CHF management and vitals, INR draws, needs help with bathing, and work on ambulation and getting back up to her baseline.    This patient is homebound because: That wrist to a fall due to her age.  She is also moving to a new facility in the system will be new to her.  Resume her [facility] 2 times a week as previously    The patient is, or has been, under my care and I have initiated the establishment of the plan of care. This patient will be followed by a physician who will periodically review the plan of care.        Electronically signed by: Naida Bell MD

## 2021-09-24 NOTE — ED NOTES
Bed: ED08  Expected date:   Expected time:   Means of arrival:   Comments:  SPF   [90: Able to carry normal activity; minor signs or symptoms of disease.] : 90: Able to carry normal activity; minor signs or symptoms of disease.

## 2022-05-26 NOTE — ED AVS SNAPSHOT
[facility], [place], Emergency Department    [address] [place] [id number]    Phone:  [phone]                                       Brie Gonzalez   MRN: [id number]    Department:  [facility], Emergency Department   Date of Visit:  2/24/2018           After Visit Summary Signature Page     I have received my discharge instructions, and my questions have been answered. I have discussed any challenges I see with this plan with the nurse or doctor.    ..........................................................................................................................................  Patient/Patient Representative Signature      ..........................................................................................................................................  Patient Representative Print Name and Relationship to Patient    ..................................................               ................................................  Date                                            Time    ..........................................................................................................................................  Reviewed by Signature/Title    ...................................................              ..............................................  Date                                                            Time           Calm

## (undated) RX ORDER — OXYMETAZOLINE HYDROCHLORIDE 0.05 G/100ML
SPRAY NASAL
Status: DISPENSED
Start: 2017-12-11